# Patient Record
Sex: FEMALE | Race: WHITE | Employment: OTHER | URBAN - METROPOLITAN AREA
[De-identification: names, ages, dates, MRNs, and addresses within clinical notes are randomized per-mention and may not be internally consistent; named-entity substitution may affect disease eponyms.]

---

## 2018-11-05 LAB
AVERAGE GLUCOSE: NORMAL
HBA1C MFR BLD: 5.3 %

## 2019-10-02 LAB

## 2019-11-12 ENCOUNTER — OFFICE VISIT (OUTPATIENT)
Dept: VASCULAR SURGERY | Age: 59
End: 2019-11-12
Payer: COMMERCIAL

## 2019-11-12 VITALS
SYSTOLIC BLOOD PRESSURE: 150 MMHG | WEIGHT: 160 LBS | HEIGHT: 64 IN | DIASTOLIC BLOOD PRESSURE: 73 MMHG | HEART RATE: 80 BPM | BODY MASS INDEX: 27.31 KG/M2

## 2019-11-12 DIAGNOSIS — I65.23 BILATERAL CAROTID ARTERY STENOSIS: Primary | ICD-10-CM

## 2019-11-12 PROCEDURE — 99203 OFFICE O/P NEW LOW 30 MIN: CPT | Performed by: SURGERY

## 2019-11-12 RX ORDER — ROSUVASTATIN CALCIUM 10 MG/1
TABLET, COATED ORAL
Refills: 0 | COMMUNITY
Start: 2019-10-29 | End: 2020-09-16

## 2019-11-12 RX ORDER — LOSARTAN POTASSIUM AND HYDROCHLOROTHIAZIDE 12.5; 1 MG/1; MG/1
TABLET ORAL
COMMUNITY
Start: 2019-08-19 | End: 2020-09-16 | Stop reason: SDUPTHER

## 2019-11-12 RX ORDER — ALBUTEROL SULFATE 90 UG/1
AEROSOL, METERED RESPIRATORY (INHALATION)
Refills: 0 | COMMUNITY
Start: 2019-10-01 | End: 2020-09-16 | Stop reason: SDUPTHER

## 2019-11-13 LAB
ALT SERPL-CCNC: 29 U/L
AST SERPL-CCNC: 23 U/L
CHOLESTEROL, TOTAL: NORMAL
CHOLESTEROL/HDL RATIO: NORMAL
HDLC SERPL-MCNC: NORMAL MG/DL
LDL CHOLESTEROL CALCULATED: NORMAL
NONHDLC SERPL-MCNC: NORMAL MG/DL
TRIGL SERPL-MCNC: NORMAL MG/DL
VLDLC SERPL CALC-MCNC: NORMAL MG/DL

## 2020-09-16 ENCOUNTER — OFFICE VISIT (OUTPATIENT)
Dept: PRIMARY CARE CLINIC | Age: 60
End: 2020-09-16
Payer: COMMERCIAL

## 2020-09-16 VITALS
HEIGHT: 64 IN | TEMPERATURE: 97.9 F | OXYGEN SATURATION: 97 % | HEART RATE: 88 BPM | BODY MASS INDEX: 29.37 KG/M2 | WEIGHT: 172 LBS | DIASTOLIC BLOOD PRESSURE: 74 MMHG | SYSTOLIC BLOOD PRESSURE: 144 MMHG

## 2020-09-16 PROBLEM — J44.9 CHRONIC OBSTRUCTIVE PULMONARY DISEASE (HCC): Status: ACTIVE | Noted: 2020-09-16

## 2020-09-16 PROBLEM — I10 ESSENTIAL HYPERTENSION: Status: ACTIVE | Noted: 2020-09-16

## 2020-09-16 PROBLEM — E78.5 HYPERLIPIDEMIA: Status: ACTIVE | Noted: 2020-09-16

## 2020-09-16 PROCEDURE — 99214 OFFICE O/P EST MOD 30 MIN: CPT | Performed by: FAMILY MEDICINE

## 2020-09-16 RX ORDER — LOSARTAN POTASSIUM AND HYDROCHLOROTHIAZIDE 12.5; 1 MG/1; MG/1
1 TABLET ORAL DAILY
Qty: 90 TABLET | Refills: 3 | Status: SHIPPED
Start: 2020-09-16 | End: 2020-12-28 | Stop reason: SDUPTHER

## 2020-09-16 RX ORDER — ROSUVASTATIN CALCIUM 10 MG/1
10 TABLET, COATED ORAL DAILY
Qty: 90 TABLET | Refills: 3 | Status: SHIPPED
Start: 2020-09-16 | End: 2022-02-02 | Stop reason: CLARIF

## 2020-09-16 RX ORDER — ALBUTEROL SULFATE 90 UG/1
AEROSOL, METERED RESPIRATORY (INHALATION)
Qty: 1 INHALER | Refills: 0 | Status: SHIPPED
Start: 2020-09-16 | End: 2021-07-26 | Stop reason: SDUPTHER

## 2020-09-16 ASSESSMENT — PATIENT HEALTH QUESTIONNAIRE - PHQ9
SUM OF ALL RESPONSES TO PHQ QUESTIONS 1-9: 0
1. LITTLE INTEREST OR PLEASURE IN DOING THINGS: 0
2. FEELING DOWN, DEPRESSED OR HOPELESS: 0
SUM OF ALL RESPONSES TO PHQ QUESTIONS 1-9: 0
SUM OF ALL RESPONSES TO PHQ9 QUESTIONS 1 & 2: 0

## 2020-09-16 ASSESSMENT — ENCOUNTER SYMPTOMS
NAUSEA: 0
SHORTNESS OF BREATH: 0
VOMITING: 0
WHEEZING: 0

## 2020-09-17 LAB
ALBUMIN SERPL-MCNC: NORMAL G/DL
ALP BLD-CCNC: NORMAL U/L
ALT SERPL-CCNC: NORMAL U/L
ANION GAP SERPL CALCULATED.3IONS-SCNC: NORMAL MMOL/L
AST SERPL-CCNC: NORMAL U/L
BASOPHILS ABSOLUTE: NORMAL
BASOPHILS RELATIVE PERCENT: NORMAL
BILIRUB SERPL-MCNC: NORMAL MG/DL
BUN BLDV-MCNC: NORMAL MG/DL
CALCIUM SERPL-MCNC: NORMAL MG/DL
CHLORIDE BLD-SCNC: NORMAL MMOL/L
CHOLESTEROL, TOTAL: NORMAL
CHOLESTEROL/HDL RATIO: NORMAL
CO2: NORMAL
CREAT SERPL-MCNC: NORMAL MG/DL
EOSINOPHILS ABSOLUTE: NORMAL
EOSINOPHILS RELATIVE PERCENT: NORMAL
GFR CALCULATED: NORMAL
GLUCOSE BLD-MCNC: NORMAL MG/DL
HCT VFR BLD CALC: NORMAL %
HDLC SERPL-MCNC: NORMAL MG/DL
HEMOGLOBIN: NORMAL
LDL CHOLESTEROL CALCULATED: NORMAL
LYMPHOCYTES ABSOLUTE: NORMAL
LYMPHOCYTES RELATIVE PERCENT: NORMAL
MCH RBC QN AUTO: NORMAL PG
MCHC RBC AUTO-ENTMCNC: NORMAL G/DL
MCV RBC AUTO: NORMAL FL
MONOCYTES ABSOLUTE: NORMAL
MONOCYTES RELATIVE PERCENT: NORMAL
NEUTROPHILS ABSOLUTE: NORMAL
NEUTROPHILS RELATIVE PERCENT: NORMAL
NONHDLC SERPL-MCNC: NORMAL MG/DL
PLATELET # BLD: NORMAL 10*3/UL
PMV BLD AUTO: NORMAL FL
POTASSIUM SERPL-SCNC: NORMAL MMOL/L
RBC # BLD: NORMAL 10*6/UL
SODIUM BLD-SCNC: NORMAL MMOL/L
TOTAL PROTEIN: NORMAL
TRIGL SERPL-MCNC: NORMAL MG/DL
TSH SERPL DL<=0.05 MIU/L-ACNC: NORMAL M[IU]/L
VLDLC SERPL CALC-MCNC: NORMAL MG/DL
WBC # BLD: NORMAL 10*3/UL

## 2020-09-30 ENCOUNTER — TELEPHONE (OUTPATIENT)
Dept: PRIMARY CARE CLINIC | Age: 60
End: 2020-09-30

## 2020-09-30 NOTE — TELEPHONE ENCOUNTER
Everything ok except cholesterol very high  Very important she restarts her Crestor if she hasn't already

## 2020-09-30 NOTE — TELEPHONE ENCOUNTER
Pt called and stated that she hasn't heard about her labs that she had drawn a couple of wks ago for . they were drawn at [facility] in [place]

## 2020-11-09 ENCOUNTER — TELEPHONE (OUTPATIENT)
Dept: VASCULAR SURGERY | Age: 60
End: 2020-11-09

## 2020-11-10 ENCOUNTER — HOSPITAL ENCOUNTER (OUTPATIENT)
Dept: CARDIOLOGY | Age: 60
Discharge: HOME | End: 2020-11-10
Payer: COMMERCIAL

## 2020-11-10 ENCOUNTER — OFFICE VISIT (OUTPATIENT)
Dept: VASCULAR SURGERY | Age: 60
End: 2020-11-10
Payer: COMMERCIAL

## 2020-11-10 PROCEDURE — 99213 OFFICE O/P EST LOW 20 MIN: CPT | Performed by: SURGERY

## 2020-11-10 PROCEDURE — 93880 EXTRACRANIAL BILAT STUDY: CPT

## 2020-11-10 NOTE — PROGRESS NOTES
[facility] - [facility]    This is a pre read worksheet - prior to official physician interpretation    Tahir Warren  1960  Date of study: 11/10/20    Indication for study:  Carotid artery stenosis  Study : Bilateral Carotid Artery Duplex Examination    Duplex examination of the RIGHT carotid artery system identifies atherosclerotic plaque. The peak systolic velocity in internal carotid artery was 231 centimeters / second. The maximum end diastolic velocity was 76 centimeters / second. The ICA/CCA ratio is 2.9. The right vertebral artery has antegrade flow. Duplex examination of the LEFT carotid artery system identifies atherosclerotic plaque. The peak systolic velocity in internal carotid artery was 208 centimeters / second. The maximum end diastolic velocity was 81 centimeters / second. The ICA/CCA ratio is 1.9. The left vertebral artery has antegrade flow.     RIGHT ECA flow not visualized  LEFT  psv        LAST STUDY  5/23/2019

## 2020-11-10 NOTE — PROGRESS NOTES
Vascular Surgery Outpatient Progress Note      Chief Complaint   Patient presents with    Circulatory Problem     Follow up carotid stenosis       HISTORY OF PRESENT ILLNESS:                The patient is a 62 y.o. female who returns for follow-up evaluation of carotid artery disease. The patient denies any symptoms of stroke or mini stroke. She denies any hospitalizations or new medical problems. She continues taking aspirin daily and her cholesterol medications. Past Medical History:        Diagnosis Date    Allergic rhinitis     Carotid artery stenosis     Hypertension     Vitamin B12 deficiency      Past Surgical History:        Procedure Laterality Date    LUMBAR DISC SURGERY      MOUTH SURGERY      US LYMPH NODE BIOPSY  5/8/2018    US LYMPH NODE BIOPSY     Current Medications:   Prior to Admission medications    Medication Sig Start Date End Date Taking? Authorizing Provider   losartan-hydroCHLOROthiazide (HYZAAR) 100-12.5 MG per tablet Take 1 tablet by mouth daily 9/16/20  Yes Tammy Calzada MD   albuterol sulfate  (90 Base) MCG/ACT inhaler inhale 2 puffs by mouth INTO THE LUNGS SIX TIMES PER DAY if needed 9/16/20  Yes Tammy Calzada MD   rosuvastatin (CRESTOR) 10 MG tablet Take 1 tablet by mouth daily 9/16/20  Yes Tammy Calzada MD   Multiple Vitamin (MULTI VITAMIN DAILY PO) Take by mouth   Yes Historical Provider, MD   aspirin 81 MG tablet Take 81 mg by mouth daily   Yes Historical Provider, MD     Allergies:  Patient has no known allergies.     Social History     Socioeconomic History    Marital status:      Spouse name: Not on file    Number of children: Not on file    Years of education: Not on file    Highest education level: Not on file   Occupational History    Not on file   Social Needs    Financial resource strain: Not on file    Food insecurity     Worry: Not on file     Inability: Not on file    Transportation needs     Medical: Not on file Non-medical: Not on file   Tobacco Use    Smoking status: Former Smoker     Packs/day: 1.00     Years: 25.00     Pack years: 25.00     Types: Cigarettes     Start date: 63     Last attempt to quit: 2000     Years since quittin.8    Smokeless tobacco: Never Used   Substance and Sexual Activity    Alcohol use: Yes     Comment: occassionally    Drug use: Not Currently    Sexual activity: Not on file   Lifestyle    Physical activity     Days per week: Not on file     Minutes per session: Not on file    Stress: Not on file   Relationships    Social connections     Talks on phone: Not on file     Gets together: Not on file     Attends [employer] service: Not on file     Active member of club or organization: Not on file     Attends meetings of clubs or organizations: Not on file     Relationship status: Not on file    Intimate partner violence     Fear of current or ex partner: Not on file     Emotionally abused: Not on file     Physically abused: Not on file     Forced sexual activity: Not on file   Other Topics Concern    Not on file   Social History Narrative    Not on file        Family History   Problem Relation Age of Onset    Prostate Cancer Mother     High Blood Pressure Mother     Diabetes Father     Prostate Cancer Father     High Blood Pressure Father     High Blood Pressure Sister     Stroke Neg Hx     Heart Attack Neg Hx     Elevated Lipids Neg Hx     Coronary Art Dis Neg Hx     Breast Cancer Neg Hx     Heart Failure Neg Hx        REVIEW OF SYSTEMS (New symptoms):    Eyes:      Blurred vision:  No [x]/Yes []               Diplopia:   No [x]/Yes []               Vision loss:       No [x]/Yes []   Ears, nose, throat:             Hearing loss:    No [x]/Yes []      Vertigo:   No [x]/Yes []                       Swallowing problem:  No [x]/Yes []               Nose bleeds:   No [x]/Yes []      Voice hoarseness:  No [x]/Yes []  Respiratory:             Cough:   No [x]/Yes []      Pleuritic chest pain:  No [x]/Yes []                        Dyspnea:   No [x]/Yes []      Wheezing:   No [x]/Yes []  Cardiovascular:             Angina:   No [x]/Yes []      Palpitations:   No [x]/Yes []          Claudication:    No [x]/Yes []      Leg swelling:   No [x]/Yes []  Gastrointestinal:             Nausea or vomiting:  No [x]/Yes []               Abdominal pain:  No [x]/Yes []                     Intestinal bleeding: No [x]/Yes []  Musculoskeletal:             Leg pain:   No [x]/Yes []      Back pain:   No [x]/Yes []                    Weakness:   No [x]/Yes []  Neurologic:             Numbness:   No [x]/Yes []      Paralysis:   No [x]/Yes []                       Headaches:   No [x]/Yes []  Hematologic, lymphatic:   Anemia:   No [x]/Yes []              Bleeding or bruising:  No [x]/Yes []              Fevers or chills: No [x]/Yes []  Endocrine:             Temp intolerance:   No [x]/Yes []                       Polydipsia, polyuria:  No [x]/Yes []  Skin:              Rash:    No [x]/Yes []      Ulcers:   No [x]/Yes []              Abnorm pigment: No [x]/Yes []  :              Frequency/urgency:  No [x]/Yes []      Hematuria:    No [x]/Yes []                      Incontinence:    No [x]/Yes []    PHYSICAL EXAM:  There were no vitals filed for this visit.   General Appearance: alert and oriented to person, place and time, in no acute distress, well developed and well- nourished  Neurologic: no cranial nerve deficit, speech normal  Head: normocephalic and atraumatic  Eyes: extraocular eye movements intact, conjunctivae normal  ENT: external ear and ear canal normal bilaterally, nose without deformity, no carotid bruits  Pulmonary/Chest: normal air movement, no respiratory distress  Cardiovascular: normal rate, regular rhythm, no murmur  Abdomen: non-distended, no masses  Musculoskeletal: no joint deformity or tenderness  Extremities: no leg edema bilaterally  Skin: warm and dry, no rash or erythema    PULSE EXAM      Right      Left   Brachial     Radial 3 3   Femoral     Popliteal     Dorsalis Pedis     Posterior Tibial     (3=normal, 2=diminished, 1=barely palpable, 4=widened)    RADIOLOGY: [facility] today Flavio Kussmaul  1960  Date of study: 11/10/20     Indication for study:  Carotid artery stenosis  Study : Bilateral Carotid Artery Duplex Examination     Duplex examination of the RIGHT carotid artery system identifies atherosclerotic plaque. The peak systolic velocity in internal carotid artery was 231 centimeters / second. The maximum end diastolic velocity was 76 centimeters / second. The ICA/CCA ratio is 2.9. The right vertebral artery has antegrade flow.     Duplex examination of the LEFT carotid artery system identifies atherosclerotic plaque. The peak systolic velocity in internal carotid artery was 208 centimeters / second. The maximum end diastolic velocity was 81 centimeters / second. The ICA/CCA ratio is 1.9. The left vertebral artery has antegrade flow.     RIGHT ECA flow not visualized  LEFT  psv           LAST STUDY  5/23/2019    Problem List Items Addressed This Visit     None      Visit Diagnoses     Bilateral carotid artery stenosis    -  Primary    Relevant Orders    VL DUP CAROTID BILATERAL          I reviewed with the patient that from my standpoint she can continue with all her normal activities. I will plan to see her again in one year but asked her to call sooner with any new problems. No follow-ups on file.

## 2020-12-29 RX ORDER — LOSARTAN POTASSIUM AND HYDROCHLOROTHIAZIDE 12.5; 1 MG/1; MG/1
1 TABLET ORAL DAILY
Qty: 90 TABLET | Refills: 3 | Status: SHIPPED
Start: 2020-12-29 | End: 2021-12-15

## 2021-02-03 ENCOUNTER — OFFICE VISIT (OUTPATIENT)
Dept: PRIMARY CARE CLINIC | Age: 61
End: 2021-02-03
Payer: COMMERCIAL

## 2021-02-03 VITALS
WEIGHT: 180 LBS | HEART RATE: 77 BPM | SYSTOLIC BLOOD PRESSURE: 140 MMHG | DIASTOLIC BLOOD PRESSURE: 82 MMHG | BODY MASS INDEX: 30.9 KG/M2 | TEMPERATURE: 98.2 F | OXYGEN SATURATION: 96 %

## 2021-02-03 DIAGNOSIS — J40 BRONCHITIS WITH ACUTE WHEEZING: Primary | ICD-10-CM

## 2021-02-03 DIAGNOSIS — J44.9 CHRONIC OBSTRUCTIVE PULMONARY DISEASE, UNSPECIFIED COPD TYPE (HCC): ICD-10-CM

## 2021-02-03 DIAGNOSIS — E78.5 HYPERLIPIDEMIA, UNSPECIFIED HYPERLIPIDEMIA TYPE: ICD-10-CM

## 2021-02-03 DIAGNOSIS — R09.89 RHONCHI AT BOTH LUNG BASES: ICD-10-CM

## 2021-02-03 DIAGNOSIS — I10 ESSENTIAL HYPERTENSION: ICD-10-CM

## 2021-02-03 PROCEDURE — 99213 OFFICE O/P EST LOW 20 MIN: CPT | Performed by: NURSE PRACTITIONER

## 2021-02-03 RX ORDER — AZITHROMYCIN 250 MG/1
250 TABLET, FILM COATED ORAL SEE ADMIN INSTRUCTIONS
Qty: 6 TABLET | Refills: 0 | Status: SHIPPED | OUTPATIENT
Start: 2021-02-03 | End: 2021-02-08

## 2021-02-03 RX ORDER — PREDNISONE 20 MG/1
20 TABLET ORAL 2 TIMES DAILY
Qty: 10 TABLET | Refills: 0 | Status: SHIPPED | OUTPATIENT
Start: 2021-02-03 | End: 2021-02-08

## 2021-02-03 ASSESSMENT — PATIENT HEALTH QUESTIONNAIRE - PHQ9
SUM OF ALL RESPONSES TO PHQ9 QUESTIONS 1 & 2: 0
SUM OF ALL RESPONSES TO PHQ QUESTIONS 1-9: 0

## 2021-02-03 ASSESSMENT — ENCOUNTER SYMPTOMS
SORE THROAT: 0
PHOTOPHOBIA: 0
VOMITING: 0
WHEEZING: 0
COUGH: 1
EYE DISCHARGE: 0
EYE PAIN: 0
TROUBLE SWALLOWING: 0
BACK PAIN: 0
DIARRHEA: 0
EYE ITCHING: 0
SHORTNESS OF BREATH: 1
CHOKING: 0
BLOOD IN STOOL: 0
SINUS PRESSURE: 0
ABDOMINAL PAIN: 0
CHEST TIGHTNESS: 1
ABDOMINAL DISTENTION: 0
NAUSEA: 0
RHINORRHEA: 1
CONSTIPATION: 0
COLOR CHANGE: 0
FACIAL SWELLING: 0
EYE REDNESS: 0
SINUS PAIN: 0
VOICE CHANGE: 0

## 2021-02-03 ASSESSMENT — VISUAL ACUITY: OU: 1

## 2021-02-03 NOTE — PROGRESS NOTES
2/3/21  Jovanna Feeling : 1960 Sex: female  Age: 56 y.o. Chief Complaint   Patient presents with    URI     chest congestion, heaviness in chest, sob, prod. cough(green), rattling in chest,   husbanc had covid , pt never tested but was sick at the same time and assumed she had covid       Mina Elizondo is seen today on acute basis per request for self for chest congestion, shortness of breath, productive cough, and heaviness in her chest.  She states that she does have a history of COPD and when she gets a respiratory infection, it quickly turned into a bronchitis or pneumonia. She does report that her  had tested positive for Covid at the end of December and she had very similar symptoms, when he did. She reports that she was never tested for COVID-19. She is reporting some sinus congestion and drainage. She denies any acute confusion, forgetfulness, or any change in cognition. Review of Systems   Constitutional: Negative for appetite change, chills, diaphoresis, fatigue, fever and unexpected weight change. HENT: Positive for congestion (sinus), postnasal drip and rhinorrhea. Negative for ear pain, facial swelling, hearing loss, nosebleeds, sinus pressure, sinus pain, sneezing, sore throat, tinnitus, trouble swallowing and voice change. Eyes: Negative for photophobia, pain, discharge, redness and itching. Respiratory: Positive for cough (bringing up green/yellow mucus), chest tightness and shortness of breath. Negative for choking and wheezing. Cardiovascular: Negative for chest pain, palpitations and leg swelling. Gastrointestinal: Negative for abdominal distention, abdominal pain, blood in stool, constipation, diarrhea, nausea and vomiting. Endocrine: Negative for cold intolerance, heat intolerance, polydipsia, polyphagia and polyuria. Genitourinary: Negative for difficulty urinating, dysuria, flank pain, frequency, hematuria and urgency.    Musculoskeletal: Negative for arthralgias, back pain, gait problem, joint swelling, myalgias, neck pain and neck stiffness. Skin: Negative for color change, rash and wound. Allergic/Immunologic: Negative for environmental allergies and food allergies. Neurological: Negative for dizziness, tremors, seizures, syncope, facial asymmetry, speech difficulty, weakness, light-headedness, numbness and headaches. Hematological: Does not bruise/bleed easily. Psychiatric/Behavioral: Negative for agitation, behavioral problems, confusion, decreased concentration, hallucinations, self-injury, sleep disturbance and suicidal ideas. The patient is not nervous/anxious.           Current Outpatient Medications:     azithromycin (ZITHROMAX) 250 MG tablet, Take 1 tablet by mouth See Admin Instructions for 5 days 500mg on day 1 followed by 250mg on days 2 - 5, Disp: 6 tablet, Rfl: 0    predniSONE (DELTASONE) 20 MG tablet, Take 1 tablet by mouth 2 times daily for 5 days, Disp: 10 tablet, Rfl: 0    losartan-hydroCHLOROthiazide (HYZAAR) 100-12.5 MG per tablet, Take 1 tablet by mouth daily, Disp: 90 tablet, Rfl: 3    albuterol sulfate  (90 Base) MCG/ACT inhaler, inhale 2 puffs by mouth INTO THE LUNGS SIX TIMES PER DAY if needed, Disp: 1 Inhaler, Rfl: 0    rosuvastatin (CRESTOR) 10 MG tablet, Take 1 tablet by mouth daily, Disp: 90 tablet, Rfl: 3    Multiple Vitamin (MULTI VITAMIN DAILY PO), Take by mouth, Disp: , Rfl:     aspirin 81 MG tablet, Take 81 mg by mouth daily, Disp: , Rfl:   No Known Allergies    Past Medical History:   Diagnosis Date    Allergic rhinitis     Carotid artery stenosis     Hypertension     Vitamin B12 deficiency      Past Surgical History:   Procedure Laterality Date    LUMBAR DISC SURGERY      MOUTH SURGERY      US LYMPH NODE BIOPSY  5/8/2018    US LYMPH NODE BIOPSY     Family History   Problem Relation Age of Onset    Prostate Cancer Mother     High Blood Pressure Mother     Diabetes Father     Prostate Cancer Father     High Blood Pressure Father     High Blood Pressure Sister     Stroke Neg Hx     Heart Attack Neg Hx     Elevated Lipids Neg Hx     Coronary Art Dis Neg Hx     Breast Cancer Neg Hx     Heart Failure Neg Hx      Social History     Socioeconomic History    Marital status:      Spouse name: Not on file    Number of children: Not on file    Years of education: Not on file    Highest education level: Not on file   Occupational History    Not on file   Social Needs    Financial resource strain: Not on file    Food insecurity     Worry: Not on file     Inability: Not on file    Transportation needs     Medical: Not on file     Non-medical: Not on file   Tobacco Use    Smoking status: Former Smoker     Packs/day: 1.00     Years: 25.00     Pack years: 25.00     Types: Cigarettes     Start date: 63     Quit date:      Years since quittin.    Smokeless tobacco: Never Used   Substance and Sexual Activity    Alcohol use: Yes     Comment: occassionally    Drug use: Not Currently    Sexual activity: Not on file   Lifestyle    Physical activity     Days per week: Not on file     Minutes per session: Not on file    Stress: Not on file   Relationships    Social connections     Talks on phone: Not on file     Gets together: Not on file     Attends [employer] service: Not on file     Active member of club or organization: Not on file     Attends meetings of clubs or organizations: Not on file     Relationship status: Not on file    Intimate partner violence     Fear of current or ex partner: Not on file     Emotionally abused: Not on file     Physically abused: Not on file     Forced sexual activity: Not on file   Other Topics Concern    Not on file   Social History Narrative    Not on file       Vitals:    21 1041 21 1044   BP: (!) 146/92 (!) 140/82   Pulse: 77    Temp: 98.2 °F (36.8 °C)    TempSrc: Oral    SpO2: 96%    Weight: 180 lb (81.6 kg)        Physical Exam  Vitals signs and nursing note reviewed. Constitutional:       General: She is awake. She is not in acute distress. Appearance: Normal appearance. She is well-developed. She is obese. She is not ill-appearing, toxic-appearing or diaphoretic. HENT:      Head: Normocephalic and atraumatic. Right Ear: Hearing, tympanic membrane, ear canal and external ear normal. There is no impacted cerumen. Left Ear: Hearing, tympanic membrane, ear canal and external ear normal. There is no impacted cerumen. Nose: Nose normal. No congestion or rhinorrhea. Mouth/Throat:      Mouth: Mucous membranes are moist.      Pharynx: Oropharynx is clear. No oropharyngeal exudate or posterior oropharyngeal erythema. Eyes:      General: Lids are normal. Vision grossly intact. Gaze aligned appropriately. No scleral icterus. Right eye: No discharge. Left eye: No discharge. Extraocular Movements: Extraocular movements intact. Conjunctiva/sclera: Conjunctivae normal.      Right eye: Right conjunctiva is not injected. Left eye: Left conjunctiva is not injected. Pupils: Pupils are equal, round, and reactive to light. Neck:      Musculoskeletal: Full passive range of motion without pain, normal range of motion and neck supple. No neck rigidity or muscular tenderness. Thyroid: No thyromegaly. Vascular: No carotid bruit. Trachea: Trachea normal.   Cardiovascular:      Rate and Rhythm: Normal rate and regular rhythm. Pulses: Normal pulses. Heart sounds: Normal heart sounds, S1 normal and S2 normal. No murmur. No friction rub. No gallop. Pulmonary:      Effort: Pulmonary effort is normal. No tachypnea, accessory muscle usage or respiratory distress. Breath sounds: Normal air entry. No stridor. Examination of the right-upper field reveals wheezing. Examination of the left-upper field reveals wheezing. Examination of the right-lower field reveals rhonchi. days 500mg on day 1 followed by 250mg on days 2 - 5  -     predniSONE (DELTASONE) 20 MG tablet; Take 1 tablet by mouth 2 times daily for 5 days    Rhonchi at both lung bases  -     azithromycin (ZITHROMAX) 250 MG tablet; Take 1 tablet by mouth See Admin Instructions for 5 days 500mg on day 1 followed by 250mg on days 2 - 5  -     predniSONE (DELTASONE) 20 MG tablet; Take 1 tablet by mouth 2 times daily for 5 days    Essential hypertension    Chronic obstructive pulmonary disease, unspecified COPD type ([facility] [address]    Hyperlipidemia, unspecified hyperlipidemia type        Discussions/Education provided to patients during visit:  [] Discussed the importance to stop smoking. [x] Advised to monitor eating habits. [] Reviewed and discussed Imaging results. [] Reviewed and discussed Lab results. [x] Discussed the importance of drinking plenty of fluids. [x] Cut down on Salt, Caffeine, and Sugar. [x] Continue Medications as Discussed. [x] Communicated with patient any concerns, to phone office. Return if symptoms worsen or fail to improve. I spent 20 minutes face-to-face with this patient.       Seen By:  SALMA Sol - NP

## 2021-07-26 ENCOUNTER — OFFICE VISIT (OUTPATIENT)
Dept: PRIMARY CARE CLINIC | Age: 61
End: 2021-07-26
Payer: COMMERCIAL

## 2021-07-26 VITALS
OXYGEN SATURATION: 98 % | DIASTOLIC BLOOD PRESSURE: 76 MMHG | WEIGHT: 177 LBS | BODY MASS INDEX: 30.38 KG/M2 | HEART RATE: 75 BPM | SYSTOLIC BLOOD PRESSURE: 146 MMHG | TEMPERATURE: 96.7 F

## 2021-07-26 DIAGNOSIS — R22.1 SENSATION OF LUMP IN THROAT: Primary | ICD-10-CM

## 2021-07-26 DIAGNOSIS — R49.9 HOARSENESS OR CHANGING VOICE: ICD-10-CM

## 2021-07-26 DIAGNOSIS — J44.9 CHRONIC OBSTRUCTIVE PULMONARY DISEASE, UNSPECIFIED COPD TYPE (HCC): ICD-10-CM

## 2021-07-26 DIAGNOSIS — I10 ESSENTIAL HYPERTENSION: ICD-10-CM

## 2021-07-26 PROCEDURE — 99213 OFFICE O/P EST LOW 20 MIN: CPT | Performed by: NURSE PRACTITIONER

## 2021-07-26 RX ORDER — ALBUTEROL SULFATE 90 UG/1
AEROSOL, METERED RESPIRATORY (INHALATION)
Qty: 1 INHALER | Refills: 2 | Status: SHIPPED | OUTPATIENT
Start: 2021-07-26

## 2021-07-26 ASSESSMENT — ENCOUNTER SYMPTOMS
BACK PAIN: 1
VOICE CHANGE: 1
BLOOD IN STOOL: 0
CHOKING: 0
EYE REDNESS: 0
CONSTIPATION: 0
TROUBLE SWALLOWING: 1
COLOR CHANGE: 0
SHORTNESS OF BREATH: 0
EYE ITCHING: 0
SORE THROAT: 0
WHEEZING: 0
DIARRHEA: 0
ABDOMINAL PAIN: 0
CHEST TIGHTNESS: 0
COUGH: 1
SINUS PAIN: 0
PHOTOPHOBIA: 0
VOMITING: 0
FACIAL SWELLING: 0
NAUSEA: 0
EYE PAIN: 0
EYE DISCHARGE: 0
ABDOMINAL DISTENTION: 0
RHINORRHEA: 0
SINUS PRESSURE: 0

## 2021-07-26 ASSESSMENT — VISUAL ACUITY: OU: 1

## 2021-07-26 NOTE — PROGRESS NOTES
21  Demarco Goldman : 1960 Sex: female  Age: 56 y.o. Chief Complaint   Patient presents with    Other     feels like something stuck in lower throat ,  no pain, after eating has to clear throat x months, but recently getting worse        Yovanny Sexton is seen today, on an acute basis in the walk-in clinic, for a feeling of food getting stuck in her throat and a lump. She also reports that she has hoarseness in her voice, after eating. She does deny any choking episodes,, but states that she has difficulty swallowing her pills. She continues to report arthralgic pain and back pain, but states this is no worse than normal. She denies any acute confusion, forgetfulness, or any change in cognition. Review of Systems   Constitutional: Positive for fatigue. Negative for appetite change, chills, diaphoresis, fever and unexpected weight change. HENT: Positive for trouble swallowing (with pills) and voice change (after eating). Negative for congestion, ear pain, facial swelling, hearing loss, nosebleeds, postnasal drip, rhinorrhea, sinus pressure, sinus pain, sneezing, sore throat and tinnitus. Eyes: Negative for photophobia, pain, discharge, redness and itching. Respiratory: Positive for cough (Non-productive). Negative for choking, chest tightness, shortness of breath and wheezing. Cardiovascular: Negative for chest pain, palpitations and leg swelling. Gastrointestinal: Negative for abdominal distention, abdominal pain, blood in stool, constipation, diarrhea, nausea and vomiting. Endocrine: Negative for cold intolerance, heat intolerance, polydipsia, polyphagia and polyuria. Genitourinary: Negative for difficulty urinating, dysuria, flank pain, frequency, hematuria and urgency. Musculoskeletal: Positive for arthralgias (Right shoulder and knees) and back pain. Negative for gait problem, joint swelling, myalgias, neck pain and neck stiffness.    Skin: Negative for color change, rash and wound. Allergic/Immunologic: Negative for environmental allergies and food allergies. Neurological: Positive for headaches. Negative for dizziness, tremors, seizures, syncope, facial asymmetry, speech difficulty, weakness, light-headedness and numbness. Hematological: Does not bruise/bleed easily. Psychiatric/Behavioral: Negative for agitation, behavioral problems, confusion, decreased concentration, hallucinations, self-injury, sleep disturbance and suicidal ideas. The patient is not nervous/anxious.           Current Outpatient Medications:     albuterol sulfate  (90 Base) MCG/ACT inhaler, inhale 2 puffs by mouth INTO THE LUNGS SIX TIMES PER DAY if needed, Disp: 1 Inhaler, Rfl: 2    rosuvastatin (CRESTOR) 10 MG tablet, Take 1 tablet by mouth daily, Disp: 90 tablet, Rfl: 3    Multiple Vitamin (MULTI VITAMIN DAILY PO), Take by mouth, Disp: , Rfl:     aspirin 81 MG tablet, Take 81 mg by mouth daily, Disp: , Rfl:     losartan-hydroCHLOROthiazide (HYZAAR) 100-12.5 MG per tablet, Take 1 tablet by mouth daily, Disp: 90 tablet, Rfl: 3  No Known Allergies    Past Medical History:   Diagnosis Date    Allergic rhinitis     Carotid artery stenosis     Hypertension     Vitamin B12 deficiency      Past Surgical History:   Procedure Laterality Date    LUMBAR DISC SURGERY      MOUTH SURGERY      US LYMPH NODE BIOPSY  5/8/2018    US LYMPH NODE BIOPSY     Family History   Problem Relation Age of Onset    Prostate Cancer Mother     High Blood Pressure Mother     Diabetes Father     Prostate Cancer Father     High Blood Pressure Father     High Blood Pressure Sister     Stroke Neg Hx     Heart Attack Neg Hx     Elevated Lipids Neg Hx     Coronary Art Dis Neg Hx     Breast Cancer Neg Hx     Heart Failure Neg Hx      Social History     Socioeconomic History    Marital status:      Spouse name: Not on file    Number of children: Not on file    Years of education: Not on file    Highest education level: Not on file   Occupational History    Not on file   Tobacco Use    Smoking status: Former Smoker     Packs/day: 1.00     Years: 25.00     Pack years: 25.00     Types: Cigarettes     Start date: 63     Quit date:      Years since quittin.5    Smokeless tobacco: Never Used   Vaping Use    Vaping Use: Never used   Substance and Sexual Activity    Alcohol use: Yes     Comment: occassionally    Drug use: Not Currently    Sexual activity: Not on file   Other Topics Concern    Not on file   Social History Narrative    Not on file     Social Determinants of Health     Financial Resource Strain:     Difficulty of Paying Living Expenses:    Food Insecurity:     Worried About Running Out of Food in the Last Year:     [address] in the Last Year:    Transportation Needs:     Lack of Transportation (Medical):  Lack of Transportation (Non-Medical):    Physical Activity:     Days of Exercise per Week:     Minutes of Exercise per Session:    Stress:     Feeling of Stress :    Social Connections:     Frequency of Communication with Friends and Family:     Frequency of Social Gatherings with Friends and Family:     Attends Scientologist Services:     Active Member of Clubs or Organizations:     Attends Club or Organization Meetings:     Marital Status:    Intimate Partner Violence:     Fear of Current or Ex-Partner:     Emotionally Abused:     Physically Abused:     Sexually Abused:        Vitals:    21 1056 21 1100   BP: (!) 160/80 (!) 146/76   Pulse: 75    Temp: 96.7 °F (35.9 °C)    TempSrc: Temporal    SpO2: 98%    Weight: 177 lb (80.3 kg)        Physical Exam  Vitals and nursing note reviewed. Constitutional:       General: She is awake. She is not in acute distress. Appearance: Normal appearance. She is well-developed. She is obese. She is not ill-appearing, toxic-appearing or diaphoretic. HENT:      Head: Normocephalic and atraumatic.       Right Ear: Hearing, tympanic membrane, ear canal and external ear normal. There is no impacted cerumen. Left Ear: Hearing, tympanic membrane, ear canal and external ear normal. There is no impacted cerumen. Nose: Nose normal. No congestion or rhinorrhea. Mouth/Throat:      Lips: Pink. No lesions. Mouth: Mucous membranes are moist.      Pharynx: Oropharynx is clear. No oropharyngeal exudate or posterior oropharyngeal erythema. Eyes:      General: Lids are normal. Vision grossly intact. Gaze aligned appropriately. No scleral icterus. Right eye: No discharge. Left eye: No discharge. Extraocular Movements: Extraocular movements intact. Conjunctiva/sclera: Conjunctivae normal.      Right eye: Right conjunctiva is not injected. Left eye: Left conjunctiva is not injected. Pupils: Pupils are equal, round, and reactive to light. Neck:      Thyroid: No thyromegaly. Vascular: No carotid bruit. Trachea: Trachea normal.   Cardiovascular:      Rate and Rhythm: Normal rate and regular rhythm. Pulses: Normal pulses. Heart sounds: Normal heart sounds, S1 normal and S2 normal. No murmur heard. No friction rub. No gallop. Pulmonary:      Effort: Pulmonary effort is normal. No tachypnea, accessory muscle usage or respiratory distress. Breath sounds: Normal breath sounds and air entry. No stridor. No wheezing, rhonchi or rales. Chest:      Chest wall: No tenderness. Abdominal:      General: Bowel sounds are normal. There is no distension. Palpations: Abdomen is soft. There is no mass. Tenderness: There is no abdominal tenderness. There is no right CVA tenderness, left CVA tenderness, guarding or rebound. Hernia: No hernia is present. Musculoskeletal:         General: No swelling, tenderness, deformity or signs of injury. Normal range of motion.       Cervical back: Full passive range of motion without pain, normal range of motion and neck results. [] Reviewed and discussed Lab results. [x] Discussed the importance of drinking plenty of fluids. [] Cut down on Salt, Caffeine, and Sugar. [x] Continue Medications as Discussed. [x] Communicated with patient any concerns, to phone office. Return if symptoms worsen or fail to improve. I spent 25 minutes face-to-face with this patient.       Seen By:  SALMA Haddad NP

## 2021-09-13 ENCOUNTER — OFFICE VISIT (OUTPATIENT)
Dept: ENT CLINIC | Age: 61
End: 2021-09-13
Payer: COMMERCIAL

## 2021-09-13 VITALS
DIASTOLIC BLOOD PRESSURE: 80 MMHG | HEART RATE: 80 BPM | SYSTOLIC BLOOD PRESSURE: 107 MMHG | WEIGHT: 177 LBS | HEIGHT: 64 IN | BODY MASS INDEX: 30.22 KG/M2

## 2021-09-13 DIAGNOSIS — K21.9 LPRD (LARYNGOPHARYNGEAL REFLUX DISEASE): Primary | ICD-10-CM

## 2021-09-13 PROCEDURE — 31575 DIAGNOSTIC LARYNGOSCOPY: CPT | Performed by: OTOLARYNGOLOGY

## 2021-09-13 PROCEDURE — 99204 OFFICE O/P NEW MOD 45 MIN: CPT | Performed by: OTOLARYNGOLOGY

## 2021-09-13 RX ORDER — OMEPRAZOLE 40 MG/1
40 CAPSULE, DELAYED RELEASE ORAL DAILY
Qty: 30 CAPSULE | Refills: 0 | Status: SHIPPED
Start: 2021-09-13 | End: 2022-02-02

## 2021-09-13 ASSESSMENT — ENCOUNTER SYMPTOMS
SHORTNESS OF BREATH: 0
COUGH: 0
SINUS PAIN: 0
VOICE CHANGE: 0
SORE THROAT: 1
VOMITING: 0
TROUBLE SWALLOWING: 1

## 2021-09-13 NOTE — PROGRESS NOTES
[address] [facility] Otolaryngology  Dr. Priya Dias. SUZANNE Hilton Ms.Ed. New Consult       Patient Name:  Glen Hernandez  :  1960     CHIEF C/O:    Chief Complaint   Patient presents with    New Patient     patient states that she has developed a lump inthe throat, having trouble swollwing, continous clearing, started 6 months ago. HISTORY OBTAINED FROM:  patient    HISTORY OF PRESENT ILLNESS:       Mae Triplett is a 56y.o. year old female, here today for globus for months and raspy after eat, minor nasal congestion non smoker quit 20 years ago, with increased level of difficulty with swallow without any associated shortness of breath or stridor. No current complaints of hemoptysis, no current complaints of masses or neck lesion. No complaints of tinnitus vertigo hearing loss, does have mild to moderate nasal congestion with no significant postnasal drainage or rhinorrhea. No history of recurrent sinusitis. Past Medical History:   Diagnosis Date    Allergic rhinitis     Carotid artery stenosis     Hypertension     Vitamin B12 deficiency      Past Surgical History:   Procedure Laterality Date    LUMBAR DISC SURGERY      MOUTH SURGERY      US LYMPH NODE BIOPSY  2018    US LYMPH NODE BIOPSY       Current Outpatient Medications:     albuterol sulfate  (90 Base) MCG/ACT inhaler, inhale 2 puffs by mouth INTO THE LUNGS SIX TIMES PER DAY if needed, Disp: 1 Inhaler, Rfl: 2    rosuvastatin (CRESTOR) 10 MG tablet, Take 1 tablet by mouth daily, Disp: 90 tablet, Rfl: 3    Multiple Vitamin (MULTI VITAMIN DAILY PO), Take by mouth, Disp: , Rfl:     aspirin 81 MG tablet, Take 81 mg by mouth daily, Disp: , Rfl:     losartan-hydroCHLOROthiazide (HYZAAR) 100-12.5 MG per tablet, Take 1 tablet by mouth daily, Disp: 90 tablet, Rfl: 3  Patient has no known allergies.   Social History     Tobacco Use    Smoking status: Former Smoker     Packs/day: 1.00     Years: 25.00     Pack years: 25.00     Types: Cigarettes back: Normal range of motion. Lymphadenopathy:      Cervical: No cervical adenopathy. Skin:     General: Skin is warm. Findings: No erythema. Neurological:      Mental Status: She is alert. Cranial Nerves: No cranial nerve deficit. Procedure Note    Pre-operative Diagnosis: Globus sensation    Post-operative Diagnosis: same    Anesthesia: Lidocaine 2% and Daniel-Synephrine 1/2%    Endoscopy Type:  Flexible Laryngoscopy    Procedure Details: The patient was placed in the sitting position. After topical anesthesia and decongestion, the 4 mm laryngoscope was passed. The nasal cavities, nasopharynx, oropharynx, hypopharynx, and larynx were all examined. Vocal cords were examined during respiration and phonation. The following findings were noted:    Findings: Normal nasopharynx, normal epiglottis, normal tongue base, normal pyriform, normal TVC motion and mucosa, no subglottic masses or lesions      Condition:  Stable. Patient tolerated procedure well. Complications:  None    IMPRESSION/PLAN:  Patient seen and examined for known history of globus sensation ear and fullness, underwent fiberoptic nasopharyngoscopy today with revealing post cricoid edema consistent with LPR-like symptoms, patient is placed on PPI therapy for period of 6 weeks, proper diet reviewed as well as head of bed elevation and behavior modifications associated with LPR. Patient follow-up in 6 weeks or sooner with any increase in symptoms. Dr. Esperanza Diop Otolaryngology/Facial Plastic Surgery Residency  Associate Clinical Professor:  Julee Pearl, [facility]

## 2021-10-25 ENCOUNTER — OFFICE VISIT (OUTPATIENT)
Dept: ENT CLINIC | Age: 61
End: 2021-10-25
Payer: COMMERCIAL

## 2021-10-25 VITALS — WEIGHT: 177 LBS | BODY MASS INDEX: 30.22 KG/M2 | HEIGHT: 64 IN

## 2021-10-25 DIAGNOSIS — K21.9 LPRD (LARYNGOPHARYNGEAL REFLUX DISEASE): Primary | ICD-10-CM

## 2021-10-25 PROCEDURE — 99213 OFFICE O/P EST LOW 20 MIN: CPT | Performed by: OTOLARYNGOLOGY

## 2021-10-25 NOTE — PROGRESS NOTES
since quittin.8    Smokeless tobacco: Never Used   Vaping Use    Vaping Use: Never used   Substance Use Topics    Alcohol use: Yes     Comment: occassionally    Drug use: Not Currently     Family History   Problem Relation Age of Onset    Prostate Cancer Mother     High Blood Pressure Mother     Diabetes Father     Prostate Cancer Father     High Blood Pressure Father     High Blood Pressure Sister     Stroke Neg Hx     Heart Attack Neg Hx     Elevated Lipids Neg Hx     Coronary Art Dis Neg Hx     Breast Cancer Neg Hx     Heart Failure Neg Hx        Review of Systems   Constitutional: Negative for chills and fever. HENT: Negative for ear discharge and hearing loss. Respiratory: Negative for cough and shortness of breath. Cardiovascular: Negative for chest pain and palpitations. Gastrointestinal: Negative for vomiting. Skin: Negative for rash. Allergic/Immunologic: Negative for environmental allergies. Neurological: Negative for dizziness and headaches. Hematological: Does not bruise/bleed easily. All other systems reviewed and are negative. Ht 5' 4\" (1.626 m)   Wt 177 lb (80.3 kg)   BMI 30.38 kg/m²   Physical Exam  Vitals and nursing note reviewed. Constitutional:       Appearance: She is well-developed. Eyes:      Pupils: Pupils are equal, round, and reactive to light. Neck:      Thyroid: No thyromegaly. Trachea: No tracheal deviation. Cardiovascular:      Rate and Rhythm: Normal rate. Pulmonary:      Effort: Pulmonary effort is normal. No respiratory distress. Musculoskeletal:         General: Normal range of motion. Cervical back: Normal range of motion. Lymphadenopathy:      Cervical: No cervical adenopathy. Skin:     General: Skin is warm. Findings: No erythema. Neurological:      Mental Status: She is alert. Cranial Nerves: No cranial nerve deficit.          IMPRESSION/PLAN:  History of LPR, continue current medical therapy with follow-up in total of 2months with a wean from her PPI therapy trial.  In addition, recommend increasing her nasal saline rinses for chronic allergic rhinitis congestion and congestion. Dr. Sangeetha Miller.  Otolaryngology Facial Plastic Surgery  :  [address] [place] [address] Otolaryngology/Facial Plastic Surgery Residency  Associate Clinical Professor:  CRYSTAL Hanks  [facility]

## 2021-10-25 NOTE — PATIENT INSTRUCTIONS
Weaning off of PPI's first week take Monday,Wednesday and Friday. Second week take Tuesday and Thursday .  Third week nothing

## 2021-10-27 ENCOUNTER — TELEPHONE (OUTPATIENT)
Dept: ENT CLINIC | Age: 61
End: 2021-10-27

## 2021-10-28 RX ORDER — OMEPRAZOLE 40 MG/1
40 CAPSULE, DELAYED RELEASE ORAL
Qty: 30 CAPSULE | Refills: 0 | Status: SHIPPED
Start: 2021-10-28 | End: 2022-02-02

## 2021-11-03 ENCOUNTER — TELEPHONE (OUTPATIENT)
Dept: VASCULAR SURGERY | Age: 61
End: 2021-11-03

## 2021-11-03 DIAGNOSIS — I65.23 CAROTID STENOSIS, ASYMPTOMATIC, BILATERAL: Primary | ICD-10-CM

## 2021-11-03 NOTE — TELEPHONE ENCOUNTER
Pt to schedule Carotid testing from 11- with Dr Jyothi Jack to another facility as we have closed our lab. Keeping her appt for 11- and will move accordingly based on her testing date.

## 2021-11-12 ASSESSMENT — ENCOUNTER SYMPTOMS
SHORTNESS OF BREATH: 0
VOMITING: 0
COUGH: 0

## 2021-11-22 ENCOUNTER — TELEPHONE (OUTPATIENT)
Dept: ENT CLINIC | Age: 61
End: 2021-11-22

## 2021-11-22 RX ORDER — OMEPRAZOLE 40 MG/1
40 CAPSULE, DELAYED RELEASE ORAL DAILY
Qty: 30 CAPSULE | Refills: 3 | Status: SHIPPED
Start: 2021-11-22 | End: 2022-03-22

## 2021-11-22 NOTE — TELEPHONE ENCOUNTER
Pt called into office requesting a refill on omeprazole 40mg. Lov: 10/25/21 nov: 1/3/22 order pended.

## 2021-11-28 ENCOUNTER — HOSPITAL ENCOUNTER (OUTPATIENT)
Dept: ULTRASOUND IMAGING | Age: 61
Discharge: HOME | End: 2021-11-30
Payer: COMMERCIAL

## 2021-11-28 DIAGNOSIS — I65.23 CAROTID STENOSIS, ASYMPTOMATIC, BILATERAL: ICD-10-CM

## 2021-11-28 PROCEDURE — 93880 EXTRACRANIAL BILAT STUDY: CPT

## 2021-12-11 DIAGNOSIS — I10 ESSENTIAL HYPERTENSION: ICD-10-CM

## 2021-12-14 ENCOUNTER — OFFICE VISIT (OUTPATIENT)
Dept: VASCULAR SURGERY | Age: 61
End: 2021-12-14
Payer: COMMERCIAL

## 2021-12-14 VITALS
HEART RATE: 78 BPM | WEIGHT: 175 LBS | RESPIRATION RATE: 18 BRPM | DIASTOLIC BLOOD PRESSURE: 82 MMHG | HEIGHT: 64 IN | SYSTOLIC BLOOD PRESSURE: 130 MMHG | OXYGEN SATURATION: 98 % | BODY MASS INDEX: 29.88 KG/M2

## 2021-12-14 DIAGNOSIS — I65.23 CAROTID STENOSIS, ASYMPTOMATIC, BILATERAL: Primary | ICD-10-CM

## 2021-12-14 PROCEDURE — 99214 OFFICE O/P EST MOD 30 MIN: CPT | Performed by: PHYSICIAN ASSISTANT

## 2021-12-14 NOTE — PROGRESS NOTES
Vascular Surgery Outpatient Progress Note    Chief Complaint   Patient presents with    Carotid Disease     HISTORY OF PRESENT ILLNESS:                The patient is a 56 y.o. female who returns for follow-up evaluation of carotid artery disease. The patient denies any symptoms of stroke or mini stroke. She denies any hospitalizations or new medical problems. She is retired but stays busy. She continues taking aspirin daily. States she needs to refill her statin medication. Past Medical History:        Diagnosis Date    Allergic rhinitis     Carotid artery stenosis     Hypertension     Vitamin B12 deficiency      Past Surgical History:        Procedure Laterality Date    LUMBAR DISC SURGERY      MOUTH SURGERY      US LYMPH NODE BIOPSY  5/8/2018    US LYMPH NODE BIOPSY     Current Medications:   Prior to Admission medications    Medication Sig Start Date End Date Taking?  Authorizing Provider   omeprazole (PRILOSEC) 40 MG delayed release capsule Take 1 capsule by mouth every morning (before breakfast) 10/28/21  Yes Germain Hilton DO   albuterol sulfate  (90 Base) MCG/ACT inhaler inhale 2 puffs by mouth INTO THE LUNGS SIX TIMES PER DAY if needed 7/26/21  Yes SALMA Quinteros - NP   losartan-hydroCHLOROthiazide [facility]) 100-12.5 MG per tablet Take 1 tablet by mouth daily 12/29/20 12/14/21 Yes Isis Arriaza MD   Multiple Vitamin (MULTI VITAMIN DAILY PO) Take by mouth   Yes Historical Provider, MD   aspirin 81 MG tablet Take 81 mg by mouth daily   Yes Historical Provider, MD   omeprazole (PRILOSEC) 40 MG delayed release capsule Take 1 capsule by mouth daily  Patient not taking: Reported on 12/14/2021 11/22/21   Denzel Hilton DO   omeprazole (PRILOSEC) 40 MG delayed release capsule Take 1 capsule by mouth daily  Patient not taking: Reported on 12/14/2021 9/13/21 10/13/21  Catarina Hilton DO   rosuvastatin (CRESTOR) 10 MG tablet Take 1 tablet by mouth daily  Patient not taking: Reported on 2021   Mana Etienne MD     Allergies:  Patient has no known allergies. Social History     Socioeconomic History    Marital status:      Spouse name: Not on file    Number of children: Not on file    Years of education: Not on file    Highest education level: Not on file   Occupational History    Not on file   Tobacco Use    Smoking status: Former Smoker     Packs/day: 1.00     Years: 25.00     Pack years: 25.00     Types: Cigarettes     Start date: 63     Quit date:      Years since quittin.9    Smokeless tobacco: Never Used   Vaping Use    Vaping Use: Never used   Substance and Sexual Activity    Alcohol use: Yes     Comment: occassionally    Drug use: Not Currently    Sexual activity: Not on file   Other Topics Concern    Not on file   Social History Narrative    Not on file     Social Determinants of Health     Financial Resource Strain:     Difficulty of Paying Living Expenses: Not on file   Food Insecurity:     Worried About [address] in the Last Year: Not on file    [address] in the Last Year: Not on file   Transportation Needs:     Lack of Transportation (Medical): Not on file    Lack of Transportation (Non-Medical):  Not on file   Physical Activity:     Days of Exercise per Week: Not on file    Minutes of Exercise per Session: Not on file   Stress:     Feeling of Stress : Not on file   Social Connections:     Frequency of Communication with Friends and Family: Not on file    Frequency of Social Gatherings with Friends and Family: Not on file    Attends Amish Services: Not on file    Active Member of Clubs or Organizations: Not on file    Attends Club or Organization Meetings: Not on file    Marital Status: Not on file   Intimate Partner Violence:     Fear of Current or Ex-Partner: Not on file    Emotionally Abused: Not on file    Physically Abused: Not on file    Sexually Abused: Not on file   Housing Stability:     Unable to Pay for Housing in the Last Year: Not on file    Number of Places Lived in the Last Year: Not on file    Unstable Housing in the Last Year: Not on file        Family History   Problem Relation Age of Onset    Prostate Cancer Mother     High Blood Pressure Mother     Diabetes Father     Prostate Cancer Father     High Blood Pressure Father     High Blood Pressure Sister     Stroke Neg Hx     Heart Attack Neg Hx     Elevated Lipids Neg Hx     Coronary Art Dis Neg Hx     Breast Cancer Neg Hx     Heart Failure Neg Hx      PHYSICAL EXAM:  Vitals:    12/14/21 0957   BP: 130/82   Pulse: 78   Resp: 18   SpO2: 98%     General Appearance: alert and oriented to person, place and time, in no acute distress, well developed and well- nourished  Neurologic: no cranial nerve deficit, speech normal  Head: normocephalic and atraumatic  Eyes: extraocular eye movements intact, conjunctivae normal  ENT: external ear and ear canal normal bilaterally, nose without deformity, no carotid bruits  Pulmonary/Chest: normal air movement, no respiratory distress  Cardiovascular: normal rate, regular rhythm, no murmur  Abdomen: non-distended, no masses  Musculoskeletal: no joint deformity or tenderness  Extremities: no leg edema bilaterally  Skin: warm and dry, no rash or erythema    PULSE EXAM      Right      Left   Brachial     Radial 3 3   Femoral     Popliteal     Dorsalis Pedis     Posterior Tibial 2 2   (3=normal, 2=diminished, 1=barely palpable, 4=widened)    RADIOLOGY:   R ICA: 275/73 (5.6) L ICA: 326/103 (3.9)    Problem List Items Addressed This Visit        Vascular Problems    Carotid stenosis, asymptomatic, bilateral - Primary    Relevant Orders    CTA NECK W CONTRAST    BASIC METABOLIC PANEL        I reviewed with the patient that from my standpoint she can continue with all her normal activities. I reviewed the importance of continued tobacco avoidance.  I asked her to remain on risk reduction therapy. I discussed the results of the carotid US with the patient showing increased velocities bilaterally, L>R. CTA neck ordered to better assess degree of stenosis. She may benefit from L CEA. Pt seen and plan reviewed with Dr. Vargas Russo.      Chloe Velez PA-C

## 2021-12-15 ENCOUNTER — TELEPHONE (OUTPATIENT)
Dept: VASCULAR SURGERY | Age: 61
End: 2021-12-15

## 2021-12-15 DIAGNOSIS — I65.23 CAROTID STENOSIS, ASYMPTOMATIC, BILATERAL: ICD-10-CM

## 2021-12-15 RX ORDER — LOSARTAN POTASSIUM AND HYDROCHLOROTHIAZIDE 12.5; 1 MG/1; MG/1
TABLET ORAL
Qty: 90 TABLET | Refills: 1 | Status: SHIPPED
Start: 2021-12-15 | End: 2022-06-27

## 2021-12-28 ENCOUNTER — HOSPITAL ENCOUNTER (OUTPATIENT)
Dept: CT IMAGING | Age: 61
Discharge: HOME | End: 2021-12-30
Payer: COMMERCIAL

## 2021-12-28 DIAGNOSIS — I65.23 CAROTID STENOSIS, ASYMPTOMATIC, BILATERAL: ICD-10-CM

## 2021-12-28 PROCEDURE — 6360000004 HC RX CONTRAST MEDICATION: Performed by: RADIOLOGY

## 2021-12-28 PROCEDURE — 70498 CT ANGIOGRAPHY NECK: CPT

## 2021-12-28 RX ADMIN — IOPAMIDOL 75 ML: 755 INJECTION, SOLUTION INTRAVENOUS at 15:30

## 2022-01-03 ENCOUNTER — OFFICE VISIT (OUTPATIENT)
Dept: ENT CLINIC | Age: 62
End: 2022-01-03
Payer: COMMERCIAL

## 2022-01-03 VITALS — BODY MASS INDEX: 30.73 KG/M2 | WEIGHT: 180 LBS | HEIGHT: 64 IN

## 2022-01-03 DIAGNOSIS — R09.81 CHRONIC NASAL CONGESTION: ICD-10-CM

## 2022-01-03 DIAGNOSIS — R09.82 PND (POST-NASAL DRIP): ICD-10-CM

## 2022-01-03 DIAGNOSIS — K21.9 LPRD (LARYNGOPHARYNGEAL REFLUX DISEASE): Primary | ICD-10-CM

## 2022-01-03 PROCEDURE — 99213 OFFICE O/P EST LOW 20 MIN: CPT | Performed by: OTOLARYNGOLOGY

## 2022-01-03 ASSESSMENT — ENCOUNTER SYMPTOMS
RHINORRHEA: 1
SINUS PAIN: 1
COUGH: 0
SHORTNESS OF BREATH: 0
VOMITING: 0

## 2022-01-03 NOTE — PROGRESS NOTES
[address] [facility] Otolaryngology  Dr. Humera Storey. Adriana Wick. Ms.Ed        Patient Name:  Elise Milligan  :  1960     CHIEF C/O:    Chief Complaint   Patient presents with    Follow-up     patient states she is doing well. sometimes it feels like a lump in her throat.  Cough     some mornings has a lot of mucus in the back of the throat       HISTORY OBTAINED FROM:  patient    HISTORY OF PRESENT ILLNESS:       Riya Chu is a 59y.o. year old female, here today for follow up of who is been worked up through history of globus sensation as well as congestion and postnasal drainage. Patient was placed on PPI therapy, was noted significant provement in her globus sensation, and was placed on a wean. She is follow-up today with a wean history, who states she is having mild intermittent episodes of globus sensation only in the morning but is having significant postnasal drainage and rhinitis. No complaints of sinusitis require antibiotic therapy. No complaints epistaxis, currently not on any medical therapy for her rhinitis has not tried any significant nasal sprays in the past has done some over-the-counter decongestions as well as a Demetrice pot with only mild response. No complaints today of tinnitus vertigo hearing loss epistaxis fever chills shortness of breath stridor.       Past Medical History:   Diagnosis Date    Allergic rhinitis     Carotid artery stenosis     Hypertension     Vitamin B12 deficiency      Past Surgical History:   Procedure Laterality Date    LUMBAR DISC SURGERY      MOUTH SURGERY      US LYMPH NODE BIOPSY  2018    US LYMPH NODE BIOPSY       Current Outpatient Medications:     losartan-hydroCHLOROthiazide (HYZAAR) 100-12.5 MG per tablet, TAKE 1 TABLET DAILY, Disp: 90 tablet, Rfl: 1    omeprazole (PRILOSEC) 40 MG delayed release capsule, Take 1 capsule by mouth daily, Disp: 30 capsule, Rfl: 3    omeprazole (PRILOSEC) 40 MG delayed release capsule, Take 1 capsule by mouth every morning (before breakfast), Disp: 30 capsule, Rfl: 0    albuterol sulfate  (90 Base) MCG/ACT inhaler, inhale 2 puffs by mouth INTO THE LUNGS SIX TIMES PER DAY if needed, Disp: 1 Inhaler, Rfl: 2    rosuvastatin (CRESTOR) 10 MG tablet, Take 1 tablet by mouth daily, Disp: 90 tablet, Rfl: 3    Multiple Vitamin (MULTI VITAMIN DAILY PO), Take by mouth, Disp: , Rfl:     aspirin 81 MG tablet, Take 81 mg by mouth daily, Disp: , Rfl:     omeprazole (PRILOSEC) 40 MG delayed release capsule, Take 1 capsule by mouth daily (Patient not taking: Reported on 2021), Disp: 30 capsule, Rfl: 0  Patient has no known allergies. Social History     Tobacco Use    Smoking status: Former Smoker     Packs/day: 1.00     Years: 25.00     Pack years: 25.00     Types: Cigarettes     Start date: 63     Quit date:      Years since quittin.0    Smokeless tobacco: Never Used   Vaping Use    Vaping Use: Never used   Substance Use Topics    Alcohol use: Yes     Comment: occassionally    Drug use: Not Currently     Family History   Problem Relation Age of Onset    Prostate Cancer Mother     High Blood Pressure Mother     Diabetes Father     Prostate Cancer Father     High Blood Pressure Father     High Blood Pressure Sister     Stroke Neg Hx     Heart Attack Neg Hx     Elevated Lipids Neg Hx     Coronary Art Dis Neg Hx     Breast Cancer Neg Hx     Heart Failure Neg Hx        Review of Systems   Constitutional: Negative for chills and fever. HENT: Positive for congestion, postnasal drip, rhinorrhea and sinus pain. Negative for ear discharge and hearing loss. Respiratory: Negative for cough and shortness of breath. Cardiovascular: Negative for chest pain and palpitations. Gastrointestinal: Negative for vomiting. Skin: Negative for rash. Allergic/Immunologic: Negative for environmental allergies. Neurological: Negative for dizziness and headaches. Hematological: Does not bruise/bleed easily. All other systems reviewed and are negative. Ht 5' 4\" (1.626 m)   Wt 180 lb (81.6 kg)   BMI 30.90 kg/m²   Physical Exam  Vitals and nursing note reviewed. Constitutional:       Appearance: She is well-developed. HENT:      Right Ear: Tympanic membrane and ear canal normal.      Left Ear: Tympanic membrane and ear canal normal.      Nose: Congestion and rhinorrhea present. Mouth/Throat:      Mouth: Mucous membranes are moist.      Pharynx: No oropharyngeal exudate or posterior oropharyngeal erythema. Eyes:      Pupils: Pupils are equal, round, and reactive to light. Neck:      Thyroid: No thyromegaly. Trachea: No tracheal deviation. Cardiovascular:      Rate and Rhythm: Normal rate. Pulmonary:      Effort: Pulmonary effort is normal. No respiratory distress. Musculoskeletal:         General: Normal range of motion. Cervical back: Normal range of motion. Lymphadenopathy:      Cervical: No cervical adenopathy. Skin:     General: Skin is warm. Findings: No erythema. Neurological:      Mental Status: She is alert. Cranial Nerves: No cranial nerve deficit. IMPRESSION/PLAN:  Patient seen and examined for chronic rhinitis congestion posterior drainage with probably contribute contribute component to globus sensation after PPI wean. Should be started on nasal Astelin 2 sprays each nostril at nighttime, possibly at twice daily if not covering her for the entire 24 hours. She will use her PPI therapy only sporadically, she is requiring more than once or twice a week, she will contact the office will place her back on a regimen of therapy. Patient will follow-up in 3 months progress results. Dr. Umberto Mason.  Otolaryngology Facial Plastic Surgery  :  [facility]  Associate Clinical Professor:  Clarisse Burt, [facility]

## 2022-01-05 ENCOUNTER — TELEPHONE (OUTPATIENT)
Dept: VASCULAR SURGERY | Age: 62
End: 2022-01-05

## 2022-01-05 DIAGNOSIS — I65.23 ASYMPTOMATIC BILATERAL CAROTID ARTERY STENOSIS: Primary | ICD-10-CM

## 2022-01-18 NOTE — TELEPHONE ENCOUNTER
Left message on patient's voicemail. She has bilateral significant carotid disease seen on CAT scan and will need endarterectomy bilaterally. Will refer to cardiology for preoperative risk assessment. Asked patient to call office for follow-up.

## 2022-02-02 ENCOUNTER — OFFICE VISIT (OUTPATIENT)
Dept: CARDIOLOGY CLINIC | Age: 62
End: 2022-02-02
Payer: COMMERCIAL

## 2022-02-02 VITALS
WEIGHT: 181.4 LBS | RESPIRATION RATE: 16 BRPM | DIASTOLIC BLOOD PRESSURE: 73 MMHG | BODY MASS INDEX: 30.97 KG/M2 | SYSTOLIC BLOOD PRESSURE: 165 MMHG | HEIGHT: 64 IN | HEART RATE: 77 BPM

## 2022-02-02 DIAGNOSIS — I65.23 CAROTID STENOSIS, ASYMPTOMATIC, BILATERAL: ICD-10-CM

## 2022-02-02 DIAGNOSIS — Z01.810 PREOP CARDIOVASCULAR EXAM: Primary | ICD-10-CM

## 2022-02-02 DIAGNOSIS — E78.5 HYPERLIPIDEMIA, UNSPECIFIED HYPERLIPIDEMIA TYPE: ICD-10-CM

## 2022-02-02 PROBLEM — R00.2 PALPITATIONS: Status: ACTIVE | Noted: 2022-02-02

## 2022-02-02 PROCEDURE — 93000 ELECTROCARDIOGRAM COMPLETE: CPT | Performed by: INTERNAL MEDICINE

## 2022-02-02 PROCEDURE — 99244 OFF/OP CNSLTJ NEW/EST MOD 40: CPT | Performed by: INTERNAL MEDICINE

## 2022-02-02 RX ORDER — ROSUVASTATIN CALCIUM 20 MG/1
20 TABLET, COATED ORAL DAILY
Qty: 90 TABLET | Refills: 1 | Status: SHIPPED
Start: 2022-02-02 | End: 2022-08-25 | Stop reason: SDUPTHER

## 2022-02-02 NOTE — PROGRESS NOTES
Chief Complaint   Patient presents with    Cardiac Clearance       Patient Active Problem List    Diagnosis Date Noted    Palpitations 2022     Overview Note:     A. Ascribed in 2019 to benign PVCs by [address] with normal nuclear stress      Carotid stenosis, asymptomatic, bilateral 2021    Essential hypertension 2020    Hyperlipidemia 2020    Chronic obstructive pulmonary disease (HCC) 2020       Current Outpatient Medications   Medication Sig Dispense Refill    rosuvastatin (CRESTOR) 20 MG tablet Take 1 tablet by mouth daily 90 tablet 1    losartan-hydroCHLOROthiazide (HYZAAR) 100-12.5 MG per tablet TAKE 1 TABLET DAILY 90 tablet 1    omeprazole (PRILOSEC) 40 MG delayed release capsule Take 1 capsule by mouth daily 30 capsule 3    albuterol sulfate  (90 Base) MCG/ACT inhaler inhale 2 puffs by mouth INTO THE LUNGS SIX TIMES PER DAY if needed 1 Inhaler 2    Multiple Vitamin (MULTI VITAMIN DAILY PO) Take by mouth      aspirin 81 MG tablet Take 81 mg by mouth daily       No current facility-administered medications for this visit.         No Known Allergies    Vitals:    22 0825 22 0831   BP: (!) 171/72 (!) 165/73   Pulse: 77    Resp: 16    Weight: 181 lb 6.4 oz (82.3 kg)    Height: 5' 4\" (1.626 m)        Social History     Socioeconomic History    Marital status:      Spouse name: Not on file    Number of children: Not on file    Years of education: Not on file    Highest education level: Not on file   Occupational History    Not on file   Tobacco Use    Smoking status: Former Smoker     Packs/day: 1.00     Years: 25.00     Pack years: 25.00     Types: Cigarettes     Start date: 63     Quit date: 2000     Years since quittin.1    Smokeless tobacco: Never Used   Vaping Use    Vaping Use: Never used   Substance and Sexual Activity    Alcohol use: Yes     Comment: occassionally    Drug use: Not Currently    Sexual activity: Not on file   Other Topics Concern    Not on file   Social History Narrative    Not on file     Social Determinants of Health     Financial Resource Strain:     Difficulty of Paying Living Expenses: Not on file   Food Insecurity:     Worried About [address] in the Last Year: Not on file    Donell of Food in the Last Year: Not on file   Transportation Needs:     Lack of Transportation (Medical): Not on file    Lack of Transportation (Non-Medical):  Not on file   Physical Activity:     Days of Exercise per Week: Not on file    Minutes of Exercise per Session: Not on file   Stress:     Feeling of Stress : Not on file   Social Connections:     Frequency of Communication with Friends and Family: Not on file    Frequency of Social Gatherings with Friends and Family: Not on file    Attends Yarsanism Services: Not on file    Active Member of [address] [place] [address] or Organizations: Not on file    Attends Club or Organization Meetings: Not on file    Marital Status: Not on file   Intimate Partner Violence:     Fear of Current or Ex-Partner: Not on file    Emotionally Abused: Not on file    Physically Abused: Not on file    Sexually Abused: Not on file   Housing Stability:     Unable to Pay for Housing in the Last Year: Not on file    Number of [place] in the Last Year: Not on file    Unstable Housing in the Last Year: Not on file       Family History   Problem Relation Age of Onset    Prostate Cancer Mother     High Blood Pressure Mother     Diabetes Father     Prostate Cancer Father     High Blood Pressure Father     High Blood Pressure Sister     Stroke Neg Hx     Heart Attack Neg Hx     Elevated Lipids Neg Hx     Coronary Art Dis Neg Hx     Breast Cancer Neg Hx     Heart Failure Neg Hx          SUBJECTIVE: Ana Estrada presents to the office today for consult for pre-op evaluation prior to CEA with Dr. Alena Siddiqui  She complains of no new or unstable cardiac symptoms,  and denies exertional chest pressure/discomfort, fatigue, irregular heart beat, lower extremity edema, near-syncope, orthopnea, palpitations, paroxysmal nocturnal dyspnea, syncope and tachypnea. Was worked up in 2019 by [facility] for benign palpitations, with normal nuclear stress  Patient can do all housework. Remote smoker. Was on statin for a time but never got it refilled. Review of Systems:   Heart: as above   Lungs: as above   Eyes: denies changes in vision or discharge. Ears: denies changes in hearing or pain. Nose: denies epistaxis or masses   Throat: denies sore throat or trouble swallowing. Neuro: denies numbness, tingling, tremors. Skin: denies rashes or itching. : denies hematuria, dysuria   GI: denies vomiting, diarrhea   Psych: denies mood changed, anxiety, depression. all others negative. Physical Exam   BP (!) 165/73   Pulse 77   Resp 16   Ht 5' 4\" (1.626 m)   Wt 181 lb 6.4 oz (82.3 kg)   BMI 31.14 kg/m²   Constitutional: Oriented to person, place, and time. Obese No distress. Head: Normocephalic and atraumatic. Eyes: EOM are normal. Pupils are equal, round, and reactive to light. Neck: Normal range of motion. Neck supple. No hepatojugular reflux and no JVD present. Carotid bruit is not present. Cardiovascular: Normal rate, regular rhythm, normal heart sounds and intact distal pulses. Exam reveals no gallop and no friction rub. No murmur heard. Pulmonary/Chest: Effort normal and breath sounds normal. No respiratory distress. No wheezes. No rales. Abdominal: Soft. Bowel sounds are normal. No distension and no mass. No tenderness. No rebound and no guarding. Musculoskeletal: Normal range of motion. No edema and no tenderness. Neurological: Alert and oriented to person, place, and time. Skin: Skin is warm and dry. No rash noted. Not diaphoretic. No erythema. Psychiatric: Normal mood and affect.  Behavior is normal.     EKG:  normal sinus rhythm, nonspecific ST and T

## 2022-02-07 ENCOUNTER — TELEPHONE (OUTPATIENT)
Dept: VASCULAR SURGERY | Age: 62
End: 2022-02-07

## 2022-02-07 ENCOUNTER — HOSPITAL ENCOUNTER (OUTPATIENT)
Age: 62
Setting detail: SURGERY ADMIT
End: 2022-02-07
Attending: SURGERY | Admitting: SURGERY
Payer: COMMERCIAL

## 2022-02-07 DIAGNOSIS — Z01.812 PRE-OPERATIVE LABORATORY EXAMINATION: ICD-10-CM

## 2022-02-07 DIAGNOSIS — Z01.810 PREOP CARDIOVASCULAR EXAM: ICD-10-CM

## 2022-02-07 DIAGNOSIS — I65.22 CAROTID STENOSIS, ASYMPTOMATIC, LEFT: ICD-10-CM

## 2022-02-07 DIAGNOSIS — U07.1 COVID-19: Primary | ICD-10-CM

## 2022-02-08 ENCOUNTER — OFFICE VISIT (OUTPATIENT)
Dept: VASCULAR SURGERY | Age: 62
End: 2022-02-08
Payer: COMMERCIAL

## 2022-02-08 DIAGNOSIS — I65.23 ASYMPTOMATIC BILATERAL CAROTID ARTERY STENOSIS: Primary | ICD-10-CM

## 2022-02-08 PROBLEM — I65.22 CAROTID STENOSIS, ASYMPTOMATIC, LEFT: Status: ACTIVE | Noted: 2022-02-08

## 2022-02-08 PROCEDURE — 99213 OFFICE O/P EST LOW 20 MIN: CPT | Performed by: SURGERY

## 2022-02-08 RX ORDER — SODIUM CHLORIDE 9 MG/ML
25 INJECTION, SOLUTION INTRAVENOUS PRN
Status: CANCELLED | OUTPATIENT
Start: 2022-02-08

## 2022-02-08 RX ORDER — SODIUM CHLORIDE 0.9 % (FLUSH) 0.9 %
10 SYRINGE (ML) INJECTION EVERY 12 HOURS SCHEDULED
Status: CANCELLED | OUTPATIENT
Start: 2022-02-08

## 2022-02-08 RX ORDER — SODIUM CHLORIDE 0.9 % (FLUSH) 0.9 %
10 SYRINGE (ML) INJECTION PRN
Status: CANCELLED | OUTPATIENT
Start: 2022-02-08

## 2022-02-08 NOTE — PROGRESS NOTES
Vascular Surgery Outpatient Progress Note      Chief Complaint   Patient presents with    Pre-op Exam     Preop (L) CEA       HISTORY OF PRESENT ILLNESS:                The patient is a 59 y.o. female who returns for follow-up evaluation of bilateral carotid artery stenosis. The patient denies any symptoms of stroke, mini stroke, or visual disturbances since I last saw her. She is accompanied by her . She completed cardiology evaluation. Past Medical History:        Diagnosis Date    Allergic rhinitis     Carotid artery stenosis     Hypertension     Vitamin B12 deficiency      Past Surgical History:        Procedure Laterality Date    LUMBAR DISC SURGERY      MOUTH SURGERY      US LYMPH NODE BIOPSY  5/8/2018    US LYMPH NODE BIOPSY     Current Medications:   Prior to Admission medications    Medication Sig Start Date End Date Taking? Authorizing Provider   rosuvastatin (CRESTOR) 20 MG tablet Take 1 tablet by mouth daily 2/2/22  Yes Vanessa Hernandez MD   losartan-hydroCHLOROthiazide [facility]) 100-12.5 MG per tablet TAKE 1 TABLET DAILY 12/15/21  Yes Tiffanie Figueroa MD   omeprazole (PRILOSEC) 40 MG delayed release capsule Take 1 capsule by mouth daily  Patient taking differently: Take 40 mg by mouth Twice a Week  11/22/21  Yes Germain Hilton DO   albuterol sulfate  (90 Base) MCG/ACT inhaler inhale 2 puffs by mouth INTO THE LUNGS SIX TIMES PER DAY if needed 7/26/21  Yes Evie Mao, APRN - NP   Multiple Vitamin (MULTI VITAMIN DAILY PO) Take by mouth   Yes Historical Provider, MD   aspirin 81 MG tablet Take 81 mg by mouth daily   Yes Historical Provider, MD     Allergies:  Patient has no known allergies.     Social History     Socioeconomic History    Marital status:      Spouse name: Not on file    Number of children: Not on file    Years of education: Not on file    Highest education level: Not on file   Occupational History    Not on file   Tobacco Use    Smoking status: Former Smoker     Packs/day: 1.00     Years: 25.00     Pack years: 25.00     Types: Cigarettes     Start date: 63     Quit date: 2000     Years since quittin.1    Smokeless tobacco: Never Used   Vaping Use    Vaping Use: Never used   Substance and Sexual Activity    Alcohol use: Yes     Comment: occassionally    Drug use: Not Currently    Sexual activity: Not on file   Other Topics Concern    Not on file   Social History Narrative    Not on file     Social Determinants of Health     Financial Resource Strain:     Difficulty of Paying Living Expenses: Not on file   Food Insecurity:     Worried About Running Out of Food in the Last Year: Not on file    Donell of Food in the Last Year: Not on file   Transportation Needs:     Lack of Transportation (Medical): Not on file    Lack of Transportation (Non-Medical):  Not on file   Physical Activity:     Days of Exercise per Week: Not on file    Minutes of Exercise per Session: Not on file   Stress:     Feeling of Stress : Not on file   Social Connections:     Frequency of Communication with Friends and Family: Not on file    Frequency of Social Gatherings with Friends and Family: Not on file    Attends Nondenominational Services: Not on file    Active Member of [address] [place] [address] [employer] or Organizations: Not on file    Attends Club or Organization Meetings: Not on file    Marital Status: Not on file   Intimate Partner Violence:     Fear of Current or Ex-Partner: Not on file    Emotionally Abused: Not on file    Physically Abused: Not on file    Sexually Abused: Not on file   Housing Stability:     Unable to Pay for Housing in the Last Year: Not on file    Number of [place] in the Last Year: Not on file    Unstable Housing in the Last Year: Not on file        Family History   Problem Relation Age of Onset    Prostate Cancer Mother     High Blood Pressure Mother     Diabetes Father     Prostate Cancer Father     High Blood Pressure Father     High Blood Pressure Sister     Stroke Neg Hx     Heart Attack Neg Hx     Elevated Lipids Neg Hx     Coronary Art Dis Neg Hx     Breast Cancer Neg Hx     Heart Failure Neg Hx        REVIEW OF SYSTEMS (New symptoms):    Eyes:      Blurred vision:  No [x]/Yes []               Diplopia:   No [x]/Yes []               Vision loss:       No [x]/Yes []   Ears, nose, throat:             Hearing loss:    No [x]/Yes []      Vertigo:   No [x]/Yes []                       Swallowing problem:  No [x]/Yes []               Nose bleeds:   No [x]/Yes []      Voice hoarseness:  No [x]/Yes []  Respiratory:             Cough:   No [x]/Yes []      Pleuritic chest pain:  No [x]/Yes []                        Dyspnea:   No [x]/Yes []      Wheezing:   No [x]/Yes []  Cardiovascular:             Angina:   No [x]/Yes []      Palpitations:   No [x]/Yes []          Claudication:    No [x]/Yes []      Leg swelling:   No [x]/Yes []  Gastrointestinal:             Nausea or vomiting:  No [x]/Yes []               Abdominal pain:  No [x]/Yes []                     Intestinal bleeding: No [x]/Yes []  Musculoskeletal:             Leg pain:   No [x]/Yes []      Back pain:   No [x]/Yes []                    Weakness:   No [x]/Yes []  Neurologic:             Numbness:   No [x]/Yes []      Paralysis:   No [x]/Yes []                       Headaches:   No [x]/Yes []  Hematologic, lymphatic:   Anemia:   No [x]/Yes []              Bleeding or bruising:  No [x]/Yes []              Fevers or chills: No [x]/Yes []  Endocrine:             Temp intolerance:   No [x]/Yes []                       Polydipsia, polyuria:  No [x]/Yes []  Skin:              Rash:    No [x]/Yes []      Ulcers:   No [x]/Yes []              Abnorm pigment: No [x]/Yes []  :              Frequency/urgency:  No [x]/Yes []      Hematuria:    No [x]/Yes []                      Incontinence:    No [x]/Yes []    PHYSICAL EXAM:  There were no vitals filed for this visit.   General Appearance: alert and oriented to person, place and time, in no acute distress, well developed and well- nourished  Neurologic: no cranial nerve deficit, speech normal  Head: normocephalic and atraumatic  Eyes: extraocular eye movements intact, conjunctivae normal  ENT: external ear and ear canal normal bilaterally, nose without deformity, B carotid bruits  Pulmonary/Chest: normal air movement, no respiratory distress  Cardiovascular: normal rate, regular rhythm, no murmur  Abdomen: non-distended, no masses  Musculoskeletal: no joint deformity or tenderness  Extremities: no leg edema bilaterally  Skin: warm and dry, no rash or erythema    PULSE EXAM      Right      Left   Brachial     Radial     Femoral     Popliteal     Dorsalis Pedis     Posterior Tibial     (3=normal, 2=diminished, 1=barely palpable, 4=widened)    RADIOLOGY: [facility] today    Problem List Items Addressed This Visit     None      Visit Diagnoses     Asymptomatic bilateral carotid artery stenosis    -  Primary          I reviewed the ultrasound and CAT scan images with the patient and her . I reviewed with them that I do recommend bilateral carotid endarterectomy staged and will start with the left as is there is more significant stenosis. They understand and agree. I reviewed the procedure with the patient. I discussed the risks, benefits, and alternatives of the procedure. The patient understands and consents. All questions were answered. No follow-ups on file.

## 2022-02-11 NOTE — PROGRESS NOTES
[address] PRE-ADMISSION TESTING GENERAL INSTRUCTIONS- [facility]-phone number:[phone]    GENERAL INSTRUCTIONS  [x] Antibacterial Soap shower Night before and/or AM of Surgery  [x] Nothing by mouth after midnight, including gum, candy, mints, or water. [x] You may brush your teeth, gargle, but do NOT swallow water. [x]No smoking, chewing tobacco, illegal drugs, or alcohol within 24 hours of your surgery. [x] Jewelry, valuables or body piercing's should not be brought to the hospital. All body and/or tongue piercing's must be removed prior to arriving to hospital.  ALL hair pins must be removed. [x] Do not wear makeup, lotions, powders, deodorant. Nail polish as directed by the nurse. [x] Bring insurance card and photo ID. [x] Transfusion Bracelet: Please bring with you to hospital, day of surgery  [x] Use inhalers the morning of surgery and bring with you to hospital.  [x] Bring copy of living will or healthcare power of  papers to be placed in your electronic record. PARKING INSTRUCTIONS:   [x] Arrival Time: 2/18, arrive at 5:30 am, one person to accompany you, please wear mask  · [x] Parking lot '\"I\"  is located on [facility] (the corner of [facility]). To enter, press the button and the gate will lift. A free token will be provided to exit the lot. One car per patient is allowed to park in this lot. All other cars are to park on [address] [place] [address] either in the parking garage or the handicap lot. EDUCATION INSTRUCTIONS:      [x] Pre-admission Testing educational folder given  [x] Incentive Spirometry,coughing & deep breathing exercises reviewed. [x]Medication information sheet(s)   [x]Fluoroscopy-Xray used in surgery reviewed with patient. Educational pamphlet placed in chart. [x]Pain: Post-op pain is normal and to be expected. You will be asked to rate your pain from 0-10(a zero is not acceptable-education is needed).  Your post-op

## 2022-02-15 ENCOUNTER — HOSPITAL ENCOUNTER (OUTPATIENT)
Age: 62
Discharge: HOME | End: 2022-02-17
Payer: COMMERCIAL

## 2022-02-15 DIAGNOSIS — U07.1 COVID-19: ICD-10-CM

## 2022-02-15 PROCEDURE — U0003 INFECTIOUS AGENT DETECTION BY NUCLEIC ACID (DNA OR RNA); SEVERE ACUTE RESPIRATORY SYNDROME CORONAVIRUS 2 (SARS-COV-2) (CORONAVIRUS DISEASE [COVID-19]), AMPLIFIED PROBE TECHNIQUE, MAKING USE OF HIGH THROUGHPUT TECHNOLOGIES AS DESCRIBED BY CMS-2020-01-R: HCPCS

## 2022-02-15 PROCEDURE — U0005 INFEC AGEN DETEC AMPLI PROBE: HCPCS

## 2022-02-16 ENCOUNTER — HOSPITAL ENCOUNTER (OUTPATIENT)
Dept: PREADMISSION TESTING | Age: 62
Setting detail: SURGERY ADMIT
Discharge: HOME | End: 2022-02-16
Payer: COMMERCIAL

## 2022-02-16 ENCOUNTER — HOSPITAL ENCOUNTER (OUTPATIENT)
Dept: GENERAL RADIOLOGY | Age: 62
Discharge: HOME | End: 2022-02-18
Payer: COMMERCIAL

## 2022-02-16 VITALS
TEMPERATURE: 98 F | SYSTOLIC BLOOD PRESSURE: 155 MMHG | OXYGEN SATURATION: 97 % | DIASTOLIC BLOOD PRESSURE: 74 MMHG | HEART RATE: 73 BPM | RESPIRATION RATE: 16 BRPM

## 2022-02-16 DIAGNOSIS — Z01.810 PREOP CARDIOVASCULAR EXAM: ICD-10-CM

## 2022-02-16 DIAGNOSIS — I65.22 CAROTID STENOSIS, ASYMPTOMATIC, LEFT: ICD-10-CM

## 2022-02-16 DIAGNOSIS — Z01.812 PRE-OPERATIVE LABORATORY EXAMINATION: ICD-10-CM

## 2022-02-16 LAB
ABO/RH: NORMAL
ANION GAP SERPL CALCULATED.3IONS-SCNC: 9 MMOL/L (ref 7–16)
ANTIBODY SCREEN: NORMAL
BUN BLDV-MCNC: 10 MG/DL (ref 6–23)
CALCIUM SERPL-MCNC: 9.7 MG/DL (ref 8.6–10.2)
CHLORIDE BLD-SCNC: 103 MMOL/L (ref 98–107)
CO2: 30 MMOL/L (ref 22–29)
CREAT SERPL-MCNC: 0.5 MG/DL (ref 0.5–1)
GFR AFRICAN AMERICAN: >60
GFR NON-AFRICAN AMERICAN: >60 ML/MIN/1.73
GLUCOSE BLD-MCNC: 121 MG/DL (ref 74–99)
HCT VFR BLD CALC: 44.1 % (ref 34–48)
HEMOGLOBIN: 15.1 G/DL (ref 11.5–15.5)
INR BLD: 1
MCH RBC QN AUTO: 30.1 PG (ref 26–35)
MCHC RBC AUTO-ENTMCNC: 34.2 % (ref 32–34.5)
MCV RBC AUTO: 87.8 FL (ref 80–99.9)
PDW BLD-RTO: 11.9 FL (ref 11.5–15)
PLATELET # BLD: 331 E9/L (ref 130–450)
PMV BLD AUTO: 9.4 FL (ref 7–12)
POTASSIUM REFLEX MAGNESIUM: 3.9 MMOL/L (ref 3.5–5)
PROTHROMBIN TIME: 11 SEC (ref 9.3–12.4)
RBC # BLD: 5.02 E12/L (ref 3.5–5.5)
SARS-COV-2, PCR: NOT DETECTED
SODIUM BLD-SCNC: 142 MMOL/L (ref 132–146)
WBC # BLD: 8.3 E9/L (ref 4.5–11.5)

## 2022-02-16 PROCEDURE — 85027 COMPLETE CBC AUTOMATED: CPT

## 2022-02-16 PROCEDURE — 86850 RBC ANTIBODY SCREEN: CPT

## 2022-02-16 PROCEDURE — 86923 COMPATIBILITY TEST ELECTRIC: CPT

## 2022-02-16 PROCEDURE — 86900 BLOOD TYPING SEROLOGIC ABO: CPT

## 2022-02-16 PROCEDURE — 71046 X-RAY EXAM CHEST 2 VIEWS: CPT

## 2022-02-16 PROCEDURE — 36415 COLL VENOUS BLD VENIPUNCTURE: CPT

## 2022-02-16 PROCEDURE — 86901 BLOOD TYPING SEROLOGIC RH(D): CPT

## 2022-02-16 PROCEDURE — 85610 PROTHROMBIN TIME: CPT

## 2022-02-16 PROCEDURE — 87081 CULTURE SCREEN ONLY: CPT

## 2022-02-16 PROCEDURE — 80048 BASIC METABOLIC PNL TOTAL CA: CPT

## 2022-02-17 ENCOUNTER — TELEPHONE (OUTPATIENT)
Dept: VASCULAR SURGERY | Age: 62
End: 2022-02-17

## 2022-02-17 LAB — MRSA CULTURE ONLY: NORMAL

## 2022-02-17 NOTE — TELEPHONE ENCOUNTER
Per Kellee Quiros at [facility], CEA has been denied however, angiogram would be approved. Message left on pt's voicemail cancelling CEA; scheduled carotid angio 2/23 at 7:30 a.m.

## 2022-02-22 ENCOUNTER — TELEPHONE (OUTPATIENT)
Dept: CARDIAC CATH/INVASIVE PROCEDURES | Age: 62
End: 2022-02-22

## 2022-02-23 ENCOUNTER — HOSPITAL ENCOUNTER (OUTPATIENT)
Dept: CARDIAC CATH/INVASIVE PROCEDURES | Age: 62
Discharge: HOME | End: 2022-02-23
Attending: SURGERY | Admitting: SURGERY
Payer: COMMERCIAL

## 2022-02-23 VITALS
BODY MASS INDEX: 30.73 KG/M2 | WEIGHT: 180 LBS | HEART RATE: 76 BPM | HEIGHT: 64 IN | SYSTOLIC BLOOD PRESSURE: 141 MMHG | TEMPERATURE: 97.5 F | RESPIRATION RATE: 16 BRPM | OXYGEN SATURATION: 99 % | DIASTOLIC BLOOD PRESSURE: 72 MMHG

## 2022-02-23 DIAGNOSIS — I73.9 PVD (PERIPHERAL VASCULAR DISEASE) (HCC): ICD-10-CM

## 2022-02-23 LAB
ABO/RH: NORMAL
ANION GAP SERPL CALCULATED.3IONS-SCNC: 10 MMOL/L (ref 7–16)
ANTIBODY SCREEN: NORMAL
BUN BLDV-MCNC: 11 MG/DL (ref 6–23)
CALCIUM SERPL-MCNC: 9.5 MG/DL (ref 8.6–10.2)
CHLORIDE BLD-SCNC: 101 MMOL/L (ref 98–107)
CO2: 29 MMOL/L (ref 22–29)
CREAT SERPL-MCNC: 0.5 MG/DL (ref 0.5–1)
GFR AFRICAN AMERICAN: >60
GFR NON-AFRICAN AMERICAN: >60 ML/MIN/1.73
GLUCOSE BLD-MCNC: 132 MG/DL (ref 74–99)
HCT VFR BLD CALC: 43.1 % (ref 34–48)
HEMOGLOBIN: 14.7 G/DL (ref 11.5–15.5)
INR BLD: 1
MCH RBC QN AUTO: 30.2 PG (ref 26–35)
MCHC RBC AUTO-ENTMCNC: 34.1 % (ref 32–34.5)
MCV RBC AUTO: 88.7 FL (ref 80–99.9)
PDW BLD-RTO: 11.9 FL (ref 11.5–15)
PLATELET # BLD: 316 E9/L (ref 130–450)
PMV BLD AUTO: 9.6 FL (ref 7–12)
POTASSIUM REFLEX MAGNESIUM: 4.1 MMOL/L (ref 3.5–5)
PROTHROMBIN TIME: 10.9 SEC (ref 9.3–12.4)
RBC # BLD: 4.86 E12/L (ref 3.5–5.5)
SODIUM BLD-SCNC: 140 MMOL/L (ref 132–146)
WBC # BLD: 7.9 E9/L (ref 4.5–11.5)

## 2022-02-23 PROCEDURE — 86900 BLOOD TYPING SEROLOGIC ABO: CPT

## 2022-02-23 PROCEDURE — 85027 COMPLETE CBC AUTOMATED: CPT

## 2022-02-23 PROCEDURE — 6360000002 HC RX W HCPCS

## 2022-02-23 PROCEDURE — 36222 PLACE CATH CAROTID/INOM ART: CPT | Performed by: SURGERY

## 2022-02-23 PROCEDURE — C1887 CATHETER, GUIDING: HCPCS

## 2022-02-23 PROCEDURE — 2500000003 HC RX 250 WO HCPCS

## 2022-02-23 PROCEDURE — 85610 PROTHROMBIN TIME: CPT

## 2022-02-23 PROCEDURE — C1769 GUIDE WIRE: HCPCS

## 2022-02-23 PROCEDURE — 80048 BASIC METABOLIC PNL TOTAL CA: CPT

## 2022-02-23 PROCEDURE — C1894 INTRO/SHEATH, NON-LASER: HCPCS

## 2022-02-23 PROCEDURE — 2580000003 HC RX 258: Performed by: PHYSICIAN ASSISTANT

## 2022-02-23 PROCEDURE — 86850 RBC ANTIBODY SCREEN: CPT

## 2022-02-23 PROCEDURE — 86901 BLOOD TYPING SEROLOGIC RH(D): CPT

## 2022-02-23 PROCEDURE — 36222 PLACE CATH CAROTID/INOM ART: CPT

## 2022-02-23 PROCEDURE — 36415 COLL VENOUS BLD VENIPUNCTURE: CPT

## 2022-02-23 PROCEDURE — 2709999900 HC NON-CHARGEABLE SUPPLY

## 2022-02-23 RX ORDER — OXYCODONE HYDROCHLORIDE AND ACETAMINOPHEN 5; 325 MG/1; MG/1
2 TABLET ORAL EVERY 4 HOURS PRN
Status: DISCONTINUED | OUTPATIENT
Start: 2022-02-23 | End: 2022-02-23 | Stop reason: HOSPADM

## 2022-02-23 RX ORDER — SODIUM CHLORIDE 9 MG/ML
INJECTION, SOLUTION INTRAVENOUS CONTINUOUS
Status: DISCONTINUED | OUTPATIENT
Start: 2022-02-23 | End: 2022-02-23 | Stop reason: HOSPADM

## 2022-02-23 RX ORDER — SODIUM CHLORIDE 0.9 % (FLUSH) 0.9 %
10 SYRINGE (ML) INJECTION EVERY 12 HOURS SCHEDULED
Status: DISCONTINUED | OUTPATIENT
Start: 2022-02-23 | End: 2022-02-23 | Stop reason: HOSPADM

## 2022-02-23 RX ORDER — ACETAMINOPHEN 325 MG/1
650 TABLET ORAL EVERY 4 HOURS PRN
Status: DISCONTINUED | OUTPATIENT
Start: 2022-02-23 | End: 2022-02-23 | Stop reason: HOSPADM

## 2022-02-23 RX ORDER — SODIUM CHLORIDE 9 MG/ML
25 INJECTION, SOLUTION INTRAVENOUS PRN
Status: DISCONTINUED | OUTPATIENT
Start: 2022-02-23 | End: 2022-02-23 | Stop reason: HOSPADM

## 2022-02-23 RX ORDER — OXYCODONE HYDROCHLORIDE AND ACETAMINOPHEN 5; 325 MG/1; MG/1
1 TABLET ORAL EVERY 4 HOURS PRN
Status: DISCONTINUED | OUTPATIENT
Start: 2022-02-23 | End: 2022-02-23 | Stop reason: HOSPADM

## 2022-02-23 RX ORDER — ONDANSETRON 2 MG/ML
4 INJECTION INTRAMUSCULAR; INTRAVENOUS EVERY 8 HOURS PRN
Status: DISCONTINUED | OUTPATIENT
Start: 2022-02-23 | End: 2022-02-23 | Stop reason: HOSPADM

## 2022-02-23 RX ORDER — SODIUM CHLORIDE 0.9 % (FLUSH) 0.9 %
10 SYRINGE (ML) INJECTION PRN
Status: DISCONTINUED | OUTPATIENT
Start: 2022-02-23 | End: 2022-02-23 | Stop reason: HOSPADM

## 2022-02-23 RX ADMIN — SODIUM CHLORIDE, PRESERVATIVE FREE 10 ML: 5 INJECTION INTRAVENOUS at 10:14

## 2022-02-23 RX ADMIN — SODIUM CHLORIDE: 9 INJECTION, SOLUTION INTRAVENOUS at 10:13

## 2022-02-23 RX ADMIN — SODIUM CHLORIDE: 9 INJECTION, SOLUTION INTRAVENOUS at 06:41

## 2022-02-23 NOTE — PROGRESS NOTES
Extended Stay Recovery Discharge Documentation    Final procedure site check completed, stable for discharge- Yes  Ambulated without issue post recovery completion, gait steady. Peripheral IV sites removed (see IV Flowsheet) and site asymptomatic- Yes  Patient dressed self without assistance. Discharge instructions reviewed with Patient  Other:  and verbalized understanding.    Patient discharged Home with spouse at 1:26 PM  Monitor cleaned and placed back in nurses' station- Yes

## 2022-02-23 NOTE — H&P
Vascular Surgery History and Physical      No chief complaint on file. HISTORY OF PRESENT ILLNESS:                The patient is a 59 y.o. female who returns for follow-up evaluation of bilateral carotid artery stenosis. The patient denies any symptoms of stroke, mini stroke, or visual disturbances since I last saw her. She is accompanied by her . She completed cardiology evaluation. Past Medical History:        Diagnosis Date    Allergic rhinitis     Carotid artery stenosis     Hypertension     Vitamin B12 deficiency      Past Surgical History:        Procedure Laterality Date    LUMBAR DISC SURGERY      MOUTH SURGERY      US LYMPH NODE BIOPSY  5/8/2018    US LYMPH NODE BIOPSY     Current Medications:   Prior to Admission medications    Medication Sig Start Date End Date Taking? Authorizing Provider   rosuvastatin (CRESTOR) 20 MG tablet Take 1 tablet by mouth daily 2/2/22  Yes Lazarus Castor, MD   losartan-hydroCHLOROthiazide [facility]) 100-12.5 MG per tablet TAKE 1 TABLET DAILY 12/15/21  Yes Breann Whittington MD   albuterol sulfate  (90 Base) MCG/ACT inhaler inhale 2 puffs by mouth INTO THE LUNGS SIX TIMES PER DAY if needed 7/26/21  Yes SALMA Caraballo - NP   Multiple Vitamin (MULTI VITAMIN DAILY PO) Take by mouth   Yes Historical Provider, MD   aspirin 81 MG tablet Take 81 mg by mouth daily   Yes Historical Provider, MD   omeprazole (PRILOSEC) 40 MG delayed release capsule Take 1 capsule by mouth daily  Patient taking differently: Take 40 mg by mouth Twice a Week  11/22/21   Sherry Jones DO     Allergies:  Patient has no known allergies.     Social History     Socioeconomic History    Marital status:      Spouse name: Not on file    Number of children: Not on file    Years of education: Not on file    Highest education level: Not on file   Occupational History    Not on file   Tobacco Use    Smoking status: Former Smoker     Packs/day: 1.00     Years: 25.00     Pack years: 25.00     Types: Cigarettes     Start date: 63     Quit date: 2000     Years since quittin.1    Smokeless tobacco: Never Used   Vaping Use    Vaping Use: Never used   Substance and Sexual Activity    Alcohol use: Yes     Comment: occassionally    Drug use: Not Currently    Sexual activity: Not on file   Other Topics Concern    Not on file   Social History Narrative    Not on file     Social Determinants of Health     Financial Resource Strain:     Difficulty of Paying Living Expenses: Not on file   Food Insecurity:     Worried About Running Out of Food in the Last Year: Not on file    Donell of Food in the Last Year: Not on file   Transportation Needs:     Lack of Transportation (Medical): Not on file    Lack of Transportation (Non-Medical):  Not on file   Physical Activity:     Days of Exercise per Week: Not on file    Minutes of Exercise per Session: Not on file   Stress:     Feeling of Stress : Not on file   Social Connections:     Frequency of Communication with Friends and Family: Not on file    Frequency of Social Gatherings with Friends and Family: Not on file    Attends Jew Services: Not on file    Active Member of [address] [place] [address] [employer] or Organizations: Not on file    Attends Club or Organization Meetings: Not on file    Marital Status: Not on file   Intimate Partner Violence:     Fear of Current or Ex-Partner: Not on file    Emotionally Abused: Not on file    Physically Abused: Not on file    Sexually Abused: Not on file   Housing Stability:     Unable to Pay for Housing in the Last Year: Not on file    Number of [place] in the Last Year: Not on file    Unstable Housing in the Last Year: Not on file        Family History   Problem Relation Age of Onset    Prostate Cancer Mother     High Blood Pressure Mother     Diabetes Father     Prostate Cancer Father     High Blood Pressure Father     High Blood Pressure Sister     Stroke Neg Hx     Heart Attack Neg Hx     Elevated Lipids Neg Hx     Coronary Art Dis Neg Hx     Breast Cancer Neg Hx     Heart Failure Neg Hx        REVIEW OF SYSTEMS (New symptoms):    Eyes:      Blurred vision:  No [x]/Yes []               Diplopia:   No [x]/Yes []               Vision loss:       No [x]/Yes []   Ears, nose, throat:             Hearing loss:    No [x]/Yes []      Vertigo:   No [x]/Yes []                       Swallowing problem:  No [x]/Yes []               Nose bleeds:   No [x]/Yes []      Voice hoarseness:  No [x]/Yes []  Respiratory:             Cough:   No [x]/Yes []      Pleuritic chest pain:  No [x]/Yes []                        Dyspnea:   No [x]/Yes []      Wheezing:   No [x]/Yes []  Cardiovascular:             Angina:   No [x]/Yes []      Palpitations:   No [x]/Yes []          Claudication:    No [x]/Yes []      Leg swelling:   No [x]/Yes []  Gastrointestinal:             Nausea or vomiting:  No [x]/Yes []               Abdominal pain:  No [x]/Yes []                     Intestinal bleeding: No [x]/Yes []  Musculoskeletal:             Leg pain:   No [x]/Yes []      Back pain:   No [x]/Yes []                    Weakness:   No [x]/Yes []  Neurologic:             Numbness:   No [x]/Yes []      Paralysis:   No [x]/Yes []                       Headaches:   No [x]/Yes []  Hematologic, lymphatic:   Anemia:   No [x]/Yes []              Bleeding or bruising:  No [x]/Yes []              Fevers or chills: No [x]/Yes []  Endocrine:             Temp intolerance:   No [x]/Yes []                       Polydipsia, polyuria:  No [x]/Yes []  Skin:              Rash:    No [x]/Yes []      Ulcers:   No [x]/Yes []              Abnorm pigment: No [x]/Yes []  :              Frequency/urgency:  No [x]/Yes []      Hematuria:    No [x]/Yes []                      Incontinence:    No [x]/Yes []    PHYSICAL EXAM:  Vitals:    02/23/22 0636   BP: (!) 188/79   Pulse: 76   Resp: 18   Temp: 97 °F (36.1 °C)     General Appearance: alert and oriented to person, place and time, in no acute distress, well developed and well- nourished  Neurologic: no cranial nerve deficit, speech normal  Head: normocephalic and atraumatic  Eyes: extraocular eye movements intact, conjunctivae normal  ENT: external ear and ear canal normal bilaterally, nose without deformity, B carotid bruits  Pulmonary/Chest: normal air movement, no respiratory distress  Cardiovascular: normal rate, regular rhythm, no murmur  Abdomen: non-distended, no masses  Musculoskeletal: no joint deformity or tenderness  Extremities: no leg edema bilaterally  Skin: warm and dry, no rash or erythema    PULSE EXAM      Right      Left   Brachial     Radial     Femoral     Popliteal     Dorsalis Pedis     Posterior Tibial     (3=normal, 2=diminished, 1=barely palpable, 4=widened)    RADIOLOGY: [facility] today    Problem List Items Addressed This Visit     None          I reviewed the ultrasound and CAT scan images with the patient and her . I reviewed with them that I do recommend bilateral carotid endarterectomy staged and will start with the left as is there is more significant stenosis. They understand and agree. I reviewed the procedure with the patient. I discussed the risks, benefits, and alternatives of the procedure. The patient understands and consents. All questions were answered. Patient presents today for carotid angiography prior to proceeding with CEA. The risks, benefits, alternatives, and potential complications of the procedure, including the risks of bleeding, infection, injury to surrounding structures, need for additional procedures, and death were explained to the patient. All questions were answered. The patient understands and agrees to proceed with the procedure.      Electronically signed by Yuko Vargas MD on 2/23/2022 at 6:59 AM

## 2022-02-24 NOTE — PROGRESS NOTES
I reviewed the results of the arteriogram with the patient and her . The arteriogram shows that the stenosis in the left internal carotid is approximately 50% with calcifications of the carotid bulb but no hemodynamically significant stenosis. The right carotid artery however has a 90% stenosis at the origin of the internal carotid artery. With these results, I will plan for right carotid endarterectomy. The patient and  understand and agree to the plan.

## 2022-03-02 ENCOUNTER — TELEPHONE (OUTPATIENT)
Dept: VASCULAR SURGERY | Age: 62
End: 2022-03-02

## 2022-03-03 LAB
BLOOD BANK DISPENSE STATUS: NORMAL
BLOOD BANK PRODUCT CODE: NORMAL
BPU ID: NORMAL
DESCRIPTION BLOOD BANK: NORMAL

## 2022-03-07 ENCOUNTER — TELEPHONE (OUTPATIENT)
Dept: VASCULAR SURGERY | Age: 62
End: 2022-03-07

## 2022-03-08 ENCOUNTER — OFFICE VISIT (OUTPATIENT)
Dept: VASCULAR SURGERY | Age: 62
End: 2022-03-08

## 2022-03-08 VITALS — HEIGHT: 64 IN | BODY MASS INDEX: 30.73 KG/M2 | WEIGHT: 180 LBS

## 2022-03-08 DIAGNOSIS — I65.21 CAROTID STENOSIS, ASYMPTOMATIC, RIGHT: Primary | ICD-10-CM

## 2022-03-08 PROCEDURE — 99024 POSTOP FOLLOW-UP VISIT: CPT | Performed by: SURGERY

## 2022-03-08 NOTE — PROGRESS NOTES
Vascular Surgery Progress Note    Chief Complaint   Patient presents with    Post-Op Check     s/p cardio angio       Patient returns for post operative evaluation status post arteriogram.  The patient denies any unexpected problems since the procedure. Procedure Laterality Date    LUMBAR [address] [place] SURGERY      MOUTH SURGERY      US LYMPH NODE BIOPSY  5/8/2018    US LYMPH NODE BIOPSY       Physical Exam:  The femoral puncture site is soft without evidence of infection or hematoma. Heart rhythm is regular. Right      Left   Brachial     Radial     Femoral     Popliteal     Dorsalis Pedis     Posterior Tibial     (3=normal, 2=diminished, 1=barely palpable, 4=widened)    Problem List Items Addressed This Visit     None        I reviewed the results of the arteriogram with the patient and her . We will plan for right carotid endarterectomy. I reviewed the procedure with the patient. I discussed the risks, benefits, and alternatives of the procedure. The patient understands and consents. All questions were answered.

## 2022-03-22 PROBLEM — I65.21 CAROTID STENOSIS, ASYMPTOMATIC, RIGHT: Status: ACTIVE | Noted: 2022-03-22

## 2022-03-22 NOTE — PROGRESS NOTES
to be expected. You will be asked to rate your pain from 0-10. [x] Ask your nurse for your pain medication. MEDICATION INSTRUCTIONS:    [x] Bring a complete list of your medications, please write the last time you took the medicine, give this list to the nurse. [x] Take the following medications the morning of surgery with 1-2 ounces of water: None  [x] Stop herbal supplements and vitamins 5 days before your surgery. [x] Use your inhalers the morning of surgery. Bring your emergency inhaler with you day of surgery. Albuterol  [x] Follow physician instructions regarding any blood thinners you may be taking. WHAT TO EXPECT:    [x] The day of surgery you will be greeted and checked in by the [employer].  In addition, you will be registered in the [facility] by a Patient Access Representative. Please bring your photo ID and insurance card. A nurse will greet you in accordance to the time you are needed in the pre-op area to prepare you for surgery. Please do not be discouraged if you are not greeted in the order you arrive as there are many variables that are involved in patient preparation. Your patience is greatly appreciated as you wait for your nurse. Please bring in items such as: books, magazines, newspapers, electronics, or any other items  to occupy your time in the waiting area. [x]  Delays may occur with surgery and staff will make a sincere effort to keep you informed of delays. If any delays occur with your procedure, we apologize ahead of time for your inconvenience as we recognize the value of your time.

## 2022-03-28 ENCOUNTER — HOSPITAL ENCOUNTER (OUTPATIENT)
Dept: PREADMISSION TESTING | Age: 62
Setting detail: OUTPATIENT SURGERY
Discharge: HOME | DRG: 039 | End: 2022-03-28
Payer: COMMERCIAL

## 2022-03-28 VITALS
OXYGEN SATURATION: 99 % | TEMPERATURE: 97.3 F | DIASTOLIC BLOOD PRESSURE: 85 MMHG | BODY MASS INDEX: 31.58 KG/M2 | HEART RATE: 82 BPM | RESPIRATION RATE: 18 BRPM | SYSTOLIC BLOOD PRESSURE: 170 MMHG | WEIGHT: 185 LBS | HEIGHT: 64 IN

## 2022-03-28 DIAGNOSIS — Z01.812 PRE-OPERATIVE LABORATORY EXAMINATION: Primary | ICD-10-CM

## 2022-03-28 DIAGNOSIS — Z01.810 PREOP CARDIOVASCULAR EXAM: ICD-10-CM

## 2022-03-28 DIAGNOSIS — I65.21 CAROTID STENOSIS, ASYMPTOMATIC, RIGHT: ICD-10-CM

## 2022-03-28 LAB
ABO/RH: NORMAL
ANION GAP SERPL CALCULATED.3IONS-SCNC: 10 MMOL/L (ref 7–16)
ANTIBODY SCREEN: NORMAL
BUN BLDV-MCNC: 13 MG/DL (ref 6–23)
CALCIUM SERPL-MCNC: 9.2 MG/DL (ref 8.6–10.2)
CHLORIDE BLD-SCNC: 100 MMOL/L (ref 98–107)
CO2: 29 MMOL/L (ref 22–29)
CREAT SERPL-MCNC: 0.4 MG/DL (ref 0.5–1)
GFR AFRICAN AMERICAN: >60
GFR NON-AFRICAN AMERICAN: >60 ML/MIN/1.73
GLUCOSE BLD-MCNC: 155 MG/DL (ref 74–99)
HCT VFR BLD CALC: 42 % (ref 34–48)
HEMOGLOBIN: 14 G/DL (ref 11.5–15.5)
INR BLD: 1
MCH RBC QN AUTO: 29.7 PG (ref 26–35)
MCHC RBC AUTO-ENTMCNC: 33.3 % (ref 32–34.5)
MCV RBC AUTO: 89 FL (ref 80–99.9)
PDW BLD-RTO: 11.9 FL (ref 11.5–15)
PLATELET # BLD: 314 E9/L (ref 130–450)
PMV BLD AUTO: 9.8 FL (ref 7–12)
POTASSIUM REFLEX MAGNESIUM: 4.3 MMOL/L (ref 3.5–5)
PROTHROMBIN TIME: 11.2 SEC (ref 9.3–12.4)
RBC # BLD: 4.72 E12/L (ref 3.5–5.5)
SODIUM BLD-SCNC: 139 MMOL/L (ref 132–146)
WBC # BLD: 7.7 E9/L (ref 4.5–11.5)

## 2022-03-28 PROCEDURE — 85610 PROTHROMBIN TIME: CPT

## 2022-03-28 PROCEDURE — 80048 BASIC METABOLIC PNL TOTAL CA: CPT

## 2022-03-28 PROCEDURE — 86850 RBC ANTIBODY SCREEN: CPT

## 2022-03-28 PROCEDURE — 85027 COMPLETE CBC AUTOMATED: CPT

## 2022-03-28 PROCEDURE — 36415 COLL VENOUS BLD VENIPUNCTURE: CPT

## 2022-03-28 PROCEDURE — 86923 COMPATIBILITY TEST ELECTRIC: CPT

## 2022-03-28 PROCEDURE — 86901 BLOOD TYPING SEROLOGIC RH(D): CPT

## 2022-03-28 PROCEDURE — 86900 BLOOD TYPING SEROLOGIC ABO: CPT

## 2022-03-28 PROCEDURE — 87081 CULTURE SCREEN ONLY: CPT

## 2022-03-29 LAB — MRSA CULTURE ONLY: NORMAL

## 2022-03-30 ENCOUNTER — ANESTHESIA EVENT (OUTPATIENT)
Dept: OPERATING ROOM | Age: 62
DRG: 039 | End: 2022-03-30
Payer: COMMERCIAL

## 2022-03-31 ENCOUNTER — HOSPITAL ENCOUNTER (INPATIENT)
Age: 62
LOS: 1 days | Discharge: HOME | DRG: 039 | End: 2022-04-01
Attending: SURGERY | Admitting: SURGERY
Payer: COMMERCIAL

## 2022-03-31 ENCOUNTER — ANESTHESIA (OUTPATIENT)
Dept: OPERATING ROOM | Age: 62
DRG: 039 | End: 2022-03-31
Payer: COMMERCIAL

## 2022-03-31 VITALS — DIASTOLIC BLOOD PRESSURE: 116 MMHG | SYSTOLIC BLOOD PRESSURE: 206 MMHG | OXYGEN SATURATION: 97 %

## 2022-03-31 DIAGNOSIS — I65.21 CAROTID STENOSIS, ASYMPTOMATIC, RIGHT: ICD-10-CM

## 2022-03-31 DIAGNOSIS — Z01.810 PREOP CARDIOVASCULAR EXAM: Primary | ICD-10-CM

## 2022-03-31 DIAGNOSIS — G89.18 ACUTE POSTOPERATIVE PAIN: ICD-10-CM

## 2022-03-31 PROCEDURE — 6360000002 HC RX W HCPCS: Performed by: SURGERY

## 2022-03-31 PROCEDURE — 85347 COAGULATION TIME ACTIVATED: CPT

## 2022-03-31 PROCEDURE — 94640 AIRWAY INHALATION TREATMENT: CPT

## 2022-03-31 PROCEDURE — 35301 RECHANNELING OF ARTERY: CPT | Performed by: SURGERY

## 2022-03-31 PROCEDURE — 3700000001 HC ADD 15 MINUTES (ANESTHESIA): Performed by: SURGERY

## 2022-03-31 PROCEDURE — 6370000000 HC RX 637 (ALT 250 FOR IP): Performed by: SURGERY

## 2022-03-31 PROCEDURE — 2580000003 HC RX 258: Performed by: NURSE PRACTITIONER

## 2022-03-31 PROCEDURE — 3600000015 HC SURGERY LEVEL 5 ADDTL 15MIN: Performed by: SURGERY

## 2022-03-31 PROCEDURE — 2500000003 HC RX 250 WO HCPCS: Performed by: NURSE ANESTHETIST, CERTIFIED REGISTERED

## 2022-03-31 PROCEDURE — 2500000003 HC RX 250 WO HCPCS: Performed by: SURGERY

## 2022-03-31 PROCEDURE — 2700000000 HC OXYGEN THERAPY PER DAY

## 2022-03-31 PROCEDURE — 88311 DECALCIFY TISSUE: CPT

## 2022-03-31 PROCEDURE — 3600000005 HC SURGERY LEVEL 5 BASE: Performed by: SURGERY

## 2022-03-31 PROCEDURE — 7100000000 HC PACU RECOVERY - FIRST 15 MIN

## 2022-03-31 PROCEDURE — A4217 STERILE WATER/SALINE, 500 ML: HCPCS | Performed by: SURGERY

## 2022-03-31 PROCEDURE — 7100000001 HC PACU RECOVERY - ADDTL 15 MIN

## 2022-03-31 PROCEDURE — 3700000000 HC ANESTHESIA ATTENDED CARE: Performed by: SURGERY

## 2022-03-31 PROCEDURE — 2580000003 HC RX 258: Performed by: NURSE ANESTHETIST, CERTIFIED REGISTERED

## 2022-03-31 PROCEDURE — 88304 TISSUE EXAM BY PATHOLOGIST: CPT

## 2022-03-31 PROCEDURE — 2580000003 HC RX 258: Performed by: SURGERY

## 2022-03-31 PROCEDURE — 6360000002 HC RX W HCPCS: Performed by: NURSE ANESTHETIST, CERTIFIED REGISTERED

## 2022-03-31 PROCEDURE — 2709999900 HC NON-CHARGEABLE SUPPLY: Performed by: SURGERY

## 2022-03-31 PROCEDURE — 2000000000 HC ICU R&B

## 2022-03-31 PROCEDURE — 03CK0ZZ EXTIRPATION OF MATTER FROM RIGHT INTERNAL CAROTID ARTERY, OPEN APPROACH: ICD-10-PCS | Performed by: SURGERY

## 2022-03-31 PROCEDURE — 99232 SBSQ HOSP IP/OBS MODERATE 35: CPT | Performed by: NURSE PRACTITIONER

## 2022-03-31 RX ORDER — SODIUM CHLORIDE 0.9 % (FLUSH) 0.9 %
10 SYRINGE (ML) INJECTION PRN
Status: DISCONTINUED | OUTPATIENT
Start: 2022-03-31 | End: 2022-03-31 | Stop reason: HOSPADM

## 2022-03-31 RX ORDER — CEFAZOLIN SODIUM 1 G/3ML
INJECTION, POWDER, FOR SOLUTION INTRAMUSCULAR; INTRAVENOUS PRN
Status: DISCONTINUED | OUTPATIENT
Start: 2022-03-31 | End: 2022-03-31 | Stop reason: SDUPTHER

## 2022-03-31 RX ORDER — PROTAMINE SULFATE 10 MG/ML
INJECTION, SOLUTION INTRAVENOUS PRN
Status: DISCONTINUED | OUTPATIENT
Start: 2022-03-31 | End: 2022-03-31 | Stop reason: SDUPTHER

## 2022-03-31 RX ORDER — VECURONIUM BROMIDE 1 MG/ML
INJECTION, POWDER, LYOPHILIZED, FOR SOLUTION INTRAVENOUS PRN
Status: DISCONTINUED | OUTPATIENT
Start: 2022-03-31 | End: 2022-03-31 | Stop reason: SDUPTHER

## 2022-03-31 RX ORDER — SODIUM CHLORIDE 9 MG/ML
INJECTION, SOLUTION INTRAVENOUS PRN
Status: DISCONTINUED | OUTPATIENT
Start: 2022-03-31 | End: 2022-03-31 | Stop reason: HOSPADM

## 2022-03-31 RX ORDER — SODIUM CHLORIDE 0.9 % (FLUSH) 0.9 %
5-40 SYRINGE (ML) INJECTION PRN
Status: DISCONTINUED | OUTPATIENT
Start: 2022-03-31 | End: 2022-03-31 | Stop reason: HOSPADM

## 2022-03-31 RX ORDER — HYDROCHLOROTHIAZIDE 12.5 MG/1
12.5 TABLET ORAL DAILY
Status: DISCONTINUED | OUTPATIENT
Start: 2022-03-31 | End: 2022-04-01 | Stop reason: HOSPADM

## 2022-03-31 RX ORDER — NITROGLYCERIN 5 MG/ML
INJECTION, SOLUTION INTRAVENOUS PRN
Status: DISCONTINUED | OUTPATIENT
Start: 2022-03-31 | End: 2022-03-31 | Stop reason: SDUPTHER

## 2022-03-31 RX ORDER — SODIUM CHLORIDE 9 MG/ML
INJECTION, SOLUTION INTRAVENOUS CONTINUOUS PRN
Status: DISCONTINUED | OUTPATIENT
Start: 2022-03-31 | End: 2022-03-31 | Stop reason: SDUPTHER

## 2022-03-31 RX ORDER — ESMOLOL HYDROCHLORIDE 10 MG/ML
INJECTION INTRAVENOUS PRN
Status: DISCONTINUED | OUTPATIENT
Start: 2022-03-31 | End: 2022-03-31 | Stop reason: SDUPTHER

## 2022-03-31 RX ORDER — MEPERIDINE HYDROCHLORIDE 25 MG/ML
12.5 INJECTION INTRAMUSCULAR; INTRAVENOUS; SUBCUTANEOUS EVERY 5 MIN PRN
Status: DISCONTINUED | OUTPATIENT
Start: 2022-03-31 | End: 2022-03-31 | Stop reason: HOSPADM

## 2022-03-31 RX ORDER — ONDANSETRON 2 MG/ML
INJECTION INTRAMUSCULAR; INTRAVENOUS PRN
Status: DISCONTINUED | OUTPATIENT
Start: 2022-03-31 | End: 2022-03-31 | Stop reason: SDUPTHER

## 2022-03-31 RX ORDER — FENTANYL CITRATE 50 UG/ML
25 INJECTION, SOLUTION INTRAMUSCULAR; INTRAVENOUS EVERY 5 MIN PRN
Status: DISCONTINUED | OUTPATIENT
Start: 2022-03-31 | End: 2022-03-31 | Stop reason: HOSPADM

## 2022-03-31 RX ORDER — FENTANYL CITRATE 50 UG/ML
INJECTION, SOLUTION INTRAMUSCULAR; INTRAVENOUS PRN
Status: DISCONTINUED | OUTPATIENT
Start: 2022-03-31 | End: 2022-03-31 | Stop reason: SDUPTHER

## 2022-03-31 RX ORDER — LOSARTAN POTASSIUM AND HYDROCHLOROTHIAZIDE 12.5; 1 MG/1; MG/1
1 TABLET ORAL DAILY
Status: DISCONTINUED | OUTPATIENT
Start: 2022-04-01 | End: 2022-03-31 | Stop reason: SDUPTHER

## 2022-03-31 RX ORDER — SODIUM CHLORIDE 0.9 % (FLUSH) 0.9 %
10 SYRINGE (ML) INJECTION EVERY 12 HOURS SCHEDULED
Status: DISCONTINUED | OUTPATIENT
Start: 2022-03-31 | End: 2022-03-31 | Stop reason: HOSPADM

## 2022-03-31 RX ORDER — HEPARIN SODIUM 1000 [USP'U]/ML
INJECTION, SOLUTION INTRAVENOUS; SUBCUTANEOUS PRN
Status: DISCONTINUED | OUTPATIENT
Start: 2022-03-31 | End: 2022-03-31 | Stop reason: SDUPTHER

## 2022-03-31 RX ORDER — MORPHINE SULFATE 2 MG/ML
1 INJECTION, SOLUTION INTRAMUSCULAR; INTRAVENOUS
Status: DISCONTINUED | OUTPATIENT
Start: 2022-03-31 | End: 2022-04-01

## 2022-03-31 RX ORDER — SODIUM CHLORIDE 0.9 % (FLUSH) 0.9 %
5-40 SYRINGE (ML) INJECTION EVERY 12 HOURS SCHEDULED
Status: DISCONTINUED | OUTPATIENT
Start: 2022-03-31 | End: 2022-03-31 | Stop reason: HOSPADM

## 2022-03-31 RX ORDER — SODIUM CHLORIDE 9 MG/ML
25 INJECTION, SOLUTION INTRAVENOUS PRN
Status: DISCONTINUED | OUTPATIENT
Start: 2022-03-31 | End: 2022-04-01 | Stop reason: HOSPADM

## 2022-03-31 RX ORDER — FENTANYL CITRATE 50 UG/ML
50 INJECTION, SOLUTION INTRAMUSCULAR; INTRAVENOUS EVERY 5 MIN PRN
Status: DISCONTINUED | OUTPATIENT
Start: 2022-03-31 | End: 2022-03-31 | Stop reason: HOSPADM

## 2022-03-31 RX ORDER — LIDOCAINE HYDROCHLORIDE 20 MG/ML
INJECTION, SOLUTION INTRAVENOUS PRN
Status: DISCONTINUED | OUTPATIENT
Start: 2022-03-31 | End: 2022-03-31 | Stop reason: SDUPTHER

## 2022-03-31 RX ORDER — ASPIRIN 81 MG/1
81 TABLET, CHEWABLE ORAL DAILY
Status: DISCONTINUED | OUTPATIENT
Start: 2022-04-01 | End: 2022-04-01 | Stop reason: HOSPADM

## 2022-03-31 RX ORDER — SODIUM CHLORIDE 9 MG/ML
25 INJECTION, SOLUTION INTRAVENOUS PRN
Status: DISCONTINUED | OUTPATIENT
Start: 2022-03-31 | End: 2022-03-31 | Stop reason: HOSPADM

## 2022-03-31 RX ORDER — GLYCOPYRROLATE 1 MG/5 ML
SYRINGE (ML) INTRAVENOUS PRN
Status: DISCONTINUED | OUTPATIENT
Start: 2022-03-31 | End: 2022-03-31 | Stop reason: SDUPTHER

## 2022-03-31 RX ORDER — SODIUM CHLORIDE 9 MG/ML
INJECTION, SOLUTION INTRAVENOUS CONTINUOUS
Status: ACTIVE | OUTPATIENT
Start: 2022-03-31 | End: 2022-03-31

## 2022-03-31 RX ORDER — BUPIVACAINE HYDROCHLORIDE 2.5 MG/ML
INJECTION, SOLUTION EPIDURAL; INFILTRATION; INTRACAUDAL PRN
Status: DISCONTINUED | OUTPATIENT
Start: 2022-03-31 | End: 2022-03-31 | Stop reason: HOSPADM

## 2022-03-31 RX ORDER — MORPHINE SULFATE 2 MG/ML
2 INJECTION, SOLUTION INTRAMUSCULAR; INTRAVENOUS
Status: DISCONTINUED | OUTPATIENT
Start: 2022-03-31 | End: 2022-04-01

## 2022-03-31 RX ORDER — ALBUTEROL SULFATE 2.5 MG/3ML
2.5 SOLUTION RESPIRATORY (INHALATION) 4 TIMES DAILY
Status: DISCONTINUED | OUTPATIENT
Start: 2022-03-31 | End: 2022-04-01 | Stop reason: HOSPADM

## 2022-03-31 RX ORDER — HYDRALAZINE HYDROCHLORIDE 20 MG/ML
INJECTION INTRAMUSCULAR; INTRAVENOUS PRN
Status: DISCONTINUED | OUTPATIENT
Start: 2022-03-31 | End: 2022-03-31 | Stop reason: SDUPTHER

## 2022-03-31 RX ORDER — SODIUM CHLORIDE 0.9 % (FLUSH) 0.9 %
10 SYRINGE (ML) INJECTION PRN
Status: DISCONTINUED | OUTPATIENT
Start: 2022-03-31 | End: 2022-04-01 | Stop reason: HOSPADM

## 2022-03-31 RX ORDER — MIDAZOLAM HYDROCHLORIDE 1 MG/ML
INJECTION INTRAMUSCULAR; INTRAVENOUS PRN
Status: DISCONTINUED | OUTPATIENT
Start: 2022-03-31 | End: 2022-03-31 | Stop reason: SDUPTHER

## 2022-03-31 RX ORDER — ACETAMINOPHEN 325 MG/1
650 TABLET ORAL EVERY 4 HOURS PRN
Status: DISCONTINUED | OUTPATIENT
Start: 2022-03-31 | End: 2022-04-01 | Stop reason: HOSPADM

## 2022-03-31 RX ORDER — LOSARTAN POTASSIUM 50 MG/1
100 TABLET ORAL DAILY
Status: DISCONTINUED | OUTPATIENT
Start: 2022-03-31 | End: 2022-04-01 | Stop reason: HOSPADM

## 2022-03-31 RX ORDER — DEXAMETHASONE SODIUM PHOSPHATE 10 MG/ML
INJECTION INTRAMUSCULAR; INTRAVENOUS PRN
Status: DISCONTINUED | OUTPATIENT
Start: 2022-03-31 | End: 2022-03-31 | Stop reason: SDUPTHER

## 2022-03-31 RX ORDER — OXYCODONE HYDROCHLORIDE 5 MG/1
5 TABLET ORAL
Status: DISCONTINUED | OUTPATIENT
Start: 2022-03-31 | End: 2022-04-01 | Stop reason: HOSPADM

## 2022-03-31 RX ORDER — ONDANSETRON 2 MG/ML
4 INJECTION INTRAMUSCULAR; INTRAVENOUS
Status: DISCONTINUED | OUTPATIENT
Start: 2022-03-31 | End: 2022-03-31 | Stop reason: HOSPADM

## 2022-03-31 RX ORDER — ROSUVASTATIN CALCIUM 20 MG/1
20 TABLET, COATED ORAL NIGHTLY
Status: DISCONTINUED | OUTPATIENT
Start: 2022-04-01 | End: 2022-04-01 | Stop reason: HOSPADM

## 2022-03-31 RX ORDER — NEOSTIGMINE METHYLSULFATE 1 MG/ML
INJECTION, SOLUTION INTRAVENOUS PRN
Status: DISCONTINUED | OUTPATIENT
Start: 2022-03-31 | End: 2022-03-31 | Stop reason: SDUPTHER

## 2022-03-31 RX ORDER — METOPROLOL TARTRATE 5 MG/5ML
5 INJECTION INTRAVENOUS EVERY 6 HOURS
Status: COMPLETED | OUTPATIENT
Start: 2022-03-31 | End: 2022-03-31

## 2022-03-31 RX ORDER — SODIUM CHLORIDE 0.9 % (FLUSH) 0.9 %
10 SYRINGE (ML) INJECTION EVERY 12 HOURS SCHEDULED
Status: DISCONTINUED | OUTPATIENT
Start: 2022-03-31 | End: 2022-04-01 | Stop reason: HOSPADM

## 2022-03-31 RX ORDER — LIDOCAINE HYDROCHLORIDE 10 MG/ML
INJECTION, SOLUTION INFILTRATION; PERINEURAL PRN
Status: DISCONTINUED | OUTPATIENT
Start: 2022-03-31 | End: 2022-03-31 | Stop reason: HOSPADM

## 2022-03-31 RX ORDER — PROPOFOL 10 MG/ML
INJECTION, EMULSION INTRAVENOUS PRN
Status: DISCONTINUED | OUTPATIENT
Start: 2022-03-31 | End: 2022-03-31 | Stop reason: SDUPTHER

## 2022-03-31 RX ADMIN — ESMOLOL HYDROCHLORIDE 20 MG: 10 INJECTION, SOLUTION INTRAVENOUS at 09:11

## 2022-03-31 RX ADMIN — NITROGLYCERIN 100 MCG: 5 INJECTION, SOLUTION INTRAVENOUS at 09:14

## 2022-03-31 RX ADMIN — ALBUTEROL SULFATE 2.5 MG: 2.5 SOLUTION RESPIRATORY (INHALATION) at 16:22

## 2022-03-31 RX ADMIN — VECURONIUM BROMIDE 2 MG: 1 INJECTION, POWDER, LYOPHILIZED, FOR SOLUTION INTRAVENOUS at 08:28

## 2022-03-31 RX ADMIN — METOPROLOL TARTRATE 5 MG: 1 INJECTION, SOLUTION INTRAVENOUS at 15:06

## 2022-03-31 RX ADMIN — HYDRALAZINE HYDROCHLORIDE 5 MG: 20 INJECTION INTRAMUSCULAR; INTRAVENOUS at 09:12

## 2022-03-31 RX ADMIN — PROTAMINE SULFATE 30 MG: 10 INJECTION, SOLUTION INTRAVENOUS at 08:54

## 2022-03-31 RX ADMIN — FENTANYL CITRATE 50 MCG: 50 INJECTION, SOLUTION INTRAMUSCULAR; INTRAVENOUS at 08:00

## 2022-03-31 RX ADMIN — NITROGLYCERIN 100 MCG: 5 INJECTION, SOLUTION INTRAVENOUS at 09:21

## 2022-03-31 RX ADMIN — NITROGLYCERIN 100 MCG: 5 INJECTION, SOLUTION INTRAVENOUS at 09:08

## 2022-03-31 RX ADMIN — Medication 0.6 MG: at 09:16

## 2022-03-31 RX ADMIN — SODIUM CHLORIDE: 9 INJECTION, SOLUTION INTRAVENOUS at 07:04

## 2022-03-31 RX ADMIN — HEPARIN SODIUM 7000 UNITS: 1000 INJECTION INTRAVENOUS; SUBCUTANEOUS at 08:14

## 2022-03-31 RX ADMIN — MORPHINE SULFATE 1 MG: 2 INJECTION, SOLUTION INTRAMUSCULAR; INTRAVENOUS at 18:05

## 2022-03-31 RX ADMIN — ONDANSETRON 4 MG: 2 INJECTION INTRAMUSCULAR; INTRAVENOUS at 08:57

## 2022-03-31 RX ADMIN — HYDRALAZINE HYDROCHLORIDE 5 MG: 20 INJECTION INTRAMUSCULAR; INTRAVENOUS at 09:09

## 2022-03-31 RX ADMIN — LOSARTAN POTASSIUM 100 MG: 50 TABLET, FILM COATED ORAL at 10:18

## 2022-03-31 RX ADMIN — NITROGLYCERIN 100 MCG: 5 INJECTION, SOLUTION INTRAVENOUS at 09:23

## 2022-03-31 RX ADMIN — MORPHINE SULFATE 1 MG: 2 INJECTION, SOLUTION INTRAMUSCULAR; INTRAVENOUS at 10:41

## 2022-03-31 RX ADMIN — MIDAZOLAM 1 MG: 1 INJECTION INTRAMUSCULAR; INTRAVENOUS at 07:15

## 2022-03-31 RX ADMIN — Medication 10 ML: at 20:53

## 2022-03-31 RX ADMIN — DEXAMETHASONE SODIUM PHOSPHATE 10 MG: 10 INJECTION INTRAMUSCULAR; INTRAVENOUS at 08:00

## 2022-03-31 RX ADMIN — MIDAZOLAM 1 MG: 1 INJECTION INTRAMUSCULAR; INTRAVENOUS at 07:20

## 2022-03-31 RX ADMIN — NITROGLYCERIN 100 MCG: 5 INJECTION, SOLUTION INTRAVENOUS at 09:16

## 2022-03-31 RX ADMIN — ALBUTEROL SULFATE 2.5 MG: 2.5 SOLUTION RESPIRATORY (INHALATION) at 12:01

## 2022-03-31 RX ADMIN — CEFAZOLIN SODIUM 2000 MG: 1 INJECTION, POWDER, FOR SOLUTION INTRAMUSCULAR; INTRAVENOUS at 07:19

## 2022-03-31 RX ADMIN — LIDOCAINE HYDROCHLORIDE 60 MG: 20 INJECTION, SOLUTION INTRAVENOUS at 07:40

## 2022-03-31 RX ADMIN — HYDROCHLOROTHIAZIDE 12.5 MG: 12.5 TABLET ORAL at 10:18

## 2022-03-31 RX ADMIN — PROPOFOL 200 MG: 10 INJECTION, EMULSION INTRAVENOUS at 07:40

## 2022-03-31 RX ADMIN — NITROGLYCERIN 50 MCG: 5 INJECTION, SOLUTION INTRAVENOUS at 09:04

## 2022-03-31 RX ADMIN — HEPARIN SODIUM 3000 UNITS: 1000 INJECTION INTRAVENOUS; SUBCUTANEOUS at 08:24

## 2022-03-31 RX ADMIN — NITROGLYCERIN 50 MCG: 5 INJECTION, SOLUTION INTRAVENOUS at 09:05

## 2022-03-31 RX ADMIN — NITROGLYCERIN 100 MCG: 5 INJECTION, SOLUTION INTRAVENOUS at 09:09

## 2022-03-31 RX ADMIN — ALBUTEROL SULFATE 2.5 MG: 2.5 SOLUTION RESPIRATORY (INHALATION) at 20:18

## 2022-03-31 RX ADMIN — ESMOLOL HYDROCHLORIDE 30 MG: 10 INJECTION, SOLUTION INTRAVENOUS at 09:17

## 2022-03-31 RX ADMIN — ESMOLOL HYDROCHLORIDE 20 MG: 10 INJECTION, SOLUTION INTRAVENOUS at 09:16

## 2022-03-31 RX ADMIN — VECURONIUM BROMIDE 5 MG: 1 INJECTION, POWDER, LYOPHILIZED, FOR SOLUTION INTRAVENOUS at 07:40

## 2022-03-31 RX ADMIN — FENTANYL CITRATE 100 MCG: 50 INJECTION, SOLUTION INTRAMUSCULAR; INTRAVENOUS at 07:40

## 2022-03-31 RX ADMIN — OXYCODONE 5 MG: 5 TABLET ORAL at 10:23

## 2022-03-31 RX ADMIN — PHENYLEPHRINE HYDROCHLORIDE 25 MCG/MIN: 10 INJECTION INTRAVENOUS at 08:31

## 2022-03-31 RX ADMIN — Medication 3 MG: at 09:16

## 2022-03-31 RX ADMIN — METOPROLOL TARTRATE 5 MG: 1 INJECTION, SOLUTION INTRAVENOUS at 20:55

## 2022-03-31 ASSESSMENT — PULMONARY FUNCTION TESTS
PIF_VALUE: 23
PIF_VALUE: 2
PIF_VALUE: 11
PIF_VALUE: 23
PIF_VALUE: 3
PIF_VALUE: 24
PIF_VALUE: 23
PIF_VALUE: 24
PIF_VALUE: 23
PIF_VALUE: 23
PIF_VALUE: 0
PIF_VALUE: 25
PIF_VALUE: 24
PIF_VALUE: 23
PIF_VALUE: 21
PIF_VALUE: 24
PIF_VALUE: 1
PIF_VALUE: 23
PIF_VALUE: 24
PIF_VALUE: 1
PIF_VALUE: 21
PIF_VALUE: 24
PIF_VALUE: 24
PIF_VALUE: 1
PIF_VALUE: 2
PIF_VALUE: 11
PIF_VALUE: 23
PIF_VALUE: 24
PIF_VALUE: 4
PIF_VALUE: 23
PIF_VALUE: 23
PIF_VALUE: 1
PIF_VALUE: 22
PIF_VALUE: 5
PIF_VALUE: 23
PIF_VALUE: 24
PIF_VALUE: 26
PIF_VALUE: 21
PIF_VALUE: 24
PIF_VALUE: 21
PIF_VALUE: 24
PIF_VALUE: 2
PIF_VALUE: 23
PIF_VALUE: 25
PIF_VALUE: 24
PIF_VALUE: 25
PIF_VALUE: 21
PIF_VALUE: 2
PIF_VALUE: 21
PIF_VALUE: 25
PIF_VALUE: 24
PIF_VALUE: 2
PIF_VALUE: 24
PIF_VALUE: 4
PIF_VALUE: 11
PIF_VALUE: 24
PIF_VALUE: 42
PIF_VALUE: 23
PIF_VALUE: 24
PIF_VALUE: 1
PIF_VALUE: 2
PIF_VALUE: 1
PIF_VALUE: 1
PIF_VALUE: 2
PIF_VALUE: 23
PIF_VALUE: 25
PIF_VALUE: 15
PIF_VALUE: 24
PIF_VALUE: 1
PIF_VALUE: 2
PIF_VALUE: 1
PIF_VALUE: 24
PIF_VALUE: 23
PIF_VALUE: 24
PIF_VALUE: 25
PIF_VALUE: 1
PIF_VALUE: 21
PIF_VALUE: 24
PIF_VALUE: 23
PIF_VALUE: 25
PIF_VALUE: 21
PIF_VALUE: 23
PIF_VALUE: 1
PIF_VALUE: 23
PIF_VALUE: 22
PIF_VALUE: 24
PIF_VALUE: 11
PIF_VALUE: 1
PIF_VALUE: 24
PIF_VALUE: 26
PIF_VALUE: 25
PIF_VALUE: 25
PIF_VALUE: 21
PIF_VALUE: 22
PIF_VALUE: 24
PIF_VALUE: 1
PIF_VALUE: 23
PIF_VALUE: 24
PIF_VALUE: 25
PIF_VALUE: 4
PIF_VALUE: 24
PIF_VALUE: 23
PIF_VALUE: 26
PIF_VALUE: 23
PIF_VALUE: 24
PIF_VALUE: 23

## 2022-03-31 ASSESSMENT — PAIN SCALES - GENERAL
PAINLEVEL_OUTOF10: 0
PAINLEVEL_OUTOF10: 2
PAINLEVEL_OUTOF10: 0
PAINLEVEL_OUTOF10: 6
PAINLEVEL_OUTOF10: 6
PAINLEVEL_OUTOF10: 0
PAINLEVEL_OUTOF10: 0
PAINLEVEL_OUTOF10: 6
PAINLEVEL_OUTOF10: 0
PAINLEVEL_OUTOF10: 2
PAINLEVEL_OUTOF10: 6

## 2022-03-31 ASSESSMENT — PAIN DESCRIPTION - LOCATION
LOCATION: NECK
LOCATION: NECK;JAW

## 2022-03-31 ASSESSMENT — ENCOUNTER SYMPTOMS: SHORTNESS OF BREATH: 1

## 2022-03-31 ASSESSMENT — PAIN DESCRIPTION - PAIN TYPE
TYPE: ACUTE PAIN;SURGICAL PAIN
TYPE: ACUTE PAIN;SURGICAL PAIN
TYPE: SURGICAL PAIN;ACUTE PAIN
TYPE: SURGICAL PAIN;ACUTE PAIN

## 2022-03-31 ASSESSMENT — PAIN DESCRIPTION - ORIENTATION
ORIENTATION: RIGHT

## 2022-03-31 ASSESSMENT — PAIN DESCRIPTION - DESCRIPTORS
DESCRIPTORS: ACHING

## 2022-03-31 ASSESSMENT — PAIN - FUNCTIONAL ASSESSMENT: PAIN_FUNCTIONAL_ASSESSMENT: 0-10

## 2022-03-31 ASSESSMENT — LIFESTYLE VARIABLES: SMOKING_STATUS: 0

## 2022-03-31 ASSESSMENT — COPD QUESTIONNAIRES: CAT_SEVERITY: MODERATE

## 2022-03-31 NOTE — OP NOTE
Operative Note      Patient: Risa Xavier  YOB: 1960  MRN: [id number]    Date of Procedure: 3/31/2022    Pre-Op Diagnosis: RIGHT CAROTID STENOSIS, asymptomatic    Post-Op Diagnosis: Same       Procedure(s):  RIGHT CAROTID ENDARTERECTOMY    Surgeon(s):  Jovita Keane MD    Assistant:   Surgical Assistant: Louise Frazier    Anesthesia: General    Estimated Blood Loss (mL): Minimal    Complications: None    Specimens:   ID Type Source Tests Collected by Time Destination   A : RIGHT CAROTID PLAQUE Tissue Tissue SURGICAL PATHOLOGY Jovita Keane MD 3/31/2022 0630        Implants:  * No implants in log *      Drains:   Urethral Catheter Non-latex 16 fr (Active)       Findings:     Detailed Description of Procedure: The patient was identified and the procedure was confirmed. The right neck was prepped and draped in the usual sterile fashion. A skin incision was made along the anterior border of the sternocleidomastoid muscle and carried down through the subcutaneous tissue. Dissection continued through the platysma and along the anterior border of the sternocleidomastoid muscle. The common facial vein was divided between silk ties. The common carotid artery was identified and dissected free from the surrounding tissues. It was surrounded proximally in the soft portion with an umbilical tape. The vagus nerve was deep to the artery and preserved. Dissection continued along the carotid artery and the external carotid artery and its superior thyroid branch were dissected free from the surrounding tissues and surrounded with vessel loops for control. The patient was then heparinized, maintaining an activated clotting time greater than 300 seconds. Dissection continued along the internal carotid artery, which was freed from the surrounding tissues and surrounded with a vessel loop for control. The hypoglossal nerve was identified and preserved.     The vessel loops on the external carotid artery branches were controlled and the internal carotid artery was clamped. The common carotid artery was clamped and then the internal carotid artery was transected at its origin. There was a 80% stenosis in the origin of the internal carotid artery. The arteriotomy was continued medially along the internal carotid artery and laterally along the bulb and common carotid artery. An eversion endarterectomy was performed of the internal carotid artery obtaining a smooth end point. A standard endarterectomy was then performed of the common carotid artery obtaining smooth end points. Loose fibrinous debris was removed from the vessel bed, which was flushed with heparinized saline solution. The internal carotid artery was then reimplanted onto the common carotid artery using a running 6-0 Prolene suture. Prior to completing the closure, the vessels were flushed and back-bled and the closure was completed. Flow was restored initially through the external carotid artery followed by the internal carotid artery. Flow through the vessels was confirmed with the handheld Doppler probe. The patient was given protamine and hemostasis was obtained. The incision was irrigated with antibiotic saline solution. The platysma was approximated with interrupted 3-0 Vicryl sutures and 0.25% Marcaine was injected into the subcutaneous tissue surrounding the incision. The skin was closed with a subcuticular Vicryl stitch and skin adhesive was applied to the skin incision in the operating room. Needle, sponge, and instrument counts were reported as correct x2. The patient tolerated the procedure and was transferred to the Cardiovascular ICU in satisfactory condition.       Electronically signed by Philomena Garcia MD on 3/31/2022 at 8:57 AM

## 2022-03-31 NOTE — PLAN OF CARE
Problem: Pain:  Goal: Pain level will decrease  Description: Pain level will decrease  Note: Monitor pain adm medications as ordered reposition pt as henrique monitor inc site for edema  Goal: Control of acute pain  Description: Control of acute pain  Outcome: Met This Shift  Goal: Control of chronic pain  Description: Control of chronic pain  Outcome: Met This Shift     Problem: Infection - Surgical Site:  Goal: Will show no infection signs and symptoms  Description: Will show no infection signs and symptoms  Outcome: Met This Shift  Note: Monitor inc site for redness and drainage adm meds as ordered adm wbc count

## 2022-03-31 NOTE — PROGRESS NOTES
Vascular Surgery Progress Note    CC: f/u CEA    HISTORY:  The patient is awake, alert, and oriented. Pain controlled. IMPRESSION:  POD # 0 s/p R CEA. PLAN:  Start clears. Advance diet as tolerated. Patient Active Problem List   Diagnosis Code    Essential hypertension I10    Hyperlipidemia E78.5    Chronic obstructive pulmonary disease ([facility] [address] J44.9    Carotid stenosis, asymptomatic, bilateral I65.23    Palpitations R00.2    Carotid stenosis, asymptomatic, left I65.22    PVD (peripheral vascular disease) ([facility] I73.9    Carotid stenosis, asymptomatic, right I65.21    Carotid stenosis, right I65.21       Current Medications:    sodium chloride 125 mL/hr at 03/31/22 1400    sodium chloride      niCARdipine 3 mg/hr (03/31/22 1400)      sodium chloride flush, sodium chloride, acetaminophen, oxyCODONE, morphine **OR** morphine    albuterol  2.5 mg Nebulization 4x daily    [START ON 4/1/2022] aspirin  81 mg Oral Daily    [START ON 4/1/2022] rosuvastatin  20 mg Oral Nightly    sodium chloride flush  10 mL IntraVENous 2 times per day    losartan  100 mg Oral Daily    And    hydroCHLOROthiazide  12.5 mg Oral Daily    metoprolol  5 mg IntraVENous Q6H          PHYSICAL EXAM:    Vitals:    03/31/22 1400   BP: (!) 150/75   Pulse: 111   Resp: 15   Temp:    SpO2: 98%     CONSTITUTIONAL:  awake, alert, cooperative, no apparent distress  LUNGS:  no increased work of breathing, good air exchange and clear to auscultation  CARDIOVASCULAR:  tachycardic with regular rhythm  Incision CDI, mild swelling. Tongue midline.    equal.    LABS:    Lab Results   Component Value Date    WBC 7.7 03/28/2022    HGB 14.0 03/28/2022    HCT 42.0 03/28/2022     03/28/2022    PROTIME 11.2 03/28/2022    INR 1.0 03/28/2022    K 4.3 03/28/2022    BUN 13 03/28/2022    CREATININE 0.4 (L) 03/28/2022       RADIOLOGY:

## 2022-03-31 NOTE — ANESTHESIA PRE PROCEDURE
Department of Anesthesiology  Preprocedure Note       Name:  Kike Muniz   Age:  59 y.o.  :  1960                                          MRN:  [id number]         Date:  3/31/2022      Surgeon: Av Swartz):  Johnna Ahmadi MD    Procedure: Procedure(s):  RIGHT CAROTID ENDARTERECTOMY    Medications prior to admission:   Prior to Admission medications    Medication Sig Start Date End Date Taking?  Authorizing Provider   rosuvastatin (CRESTOR) 20 MG tablet Take 1 tablet by mouth daily 22   Serafin Horta MD   losartan-hydroCHLOROthiazide [facility]) 100-12.5 MG per tablet TAKE 1 TABLET DAILY 12/15/21   Rosana Avalos MD   albuterol sulfate  (90 Base) MCG/ACT inhaler inhale 2 puffs by mouth INTO THE LUNGS SIX TIMES PER DAY if needed 21   Jamie Ervin APRN - NP   Multiple Vitamin (MULTI VITAMIN DAILY PO) Take 1 tablet by mouth daily     Historical Provider, MD   aspirin 81 MG tablet Take 81 mg by mouth daily    Historical Provider, MD       Current medications:    Current Facility-Administered Medications   Medication Dose Route Frequency Provider Last Rate Last Admin    sodium chloride flush 0.9 % injection 10 mL  10 mL IntraVENous 2 times per day Ivone Buoy, APRN - CNP        sodium chloride flush 0.9 % injection 10 mL  10 mL IntraVENous PRN Ivone Buoy, APRN - CNP        0.9 % sodium chloride infusion  25 mL IntraVENous PRN Ivone Ike, APRN - CNP        ceFAZolin (ANCEF) 2000 mg in sterile water 20 mL IV syringe  2,000 mg IntraVENous [facility]           Allergies:  No Known Allergies    Problem List:    Patient Active Problem List   Diagnosis Code    Essential hypertension I10    Hyperlipidemia E78.5    [facility] J44.9    Carotid stenosis, asymptomatic, bilateral I65.23    Palpitations R00.2    Carotid stenosis, asymptomatic, left I65.22    PVD (peripheral vascular disease) ([facility] I73.9    Carotid stenosis, asymptomatic, right I65.21    Carotid stenosis, right I65.21       Past Medical History:        Diagnosis Date    Allergic rhinitis     Carotid artery stenosis     Hypertension     Vitamin B12 deficiency        Past Surgical History:        Procedure Laterality Date    LUMBAR DISC SURGERY      MOUTH SURGERY      US LYMPH NODE BIOPSY  2018    US LYMPH NODE BIOPSY       Social History:    Social History     Tobacco Use    Smoking status: Former Smoker     Packs/day: 1.00     Years: 25.00     Pack years: 25.00     Types: Cigarettes     Start date: 65     Quit date:      Years since quittin.2    Smokeless tobacco: Never Used   Substance Use Topics    Alcohol use: Yes     Comment: occassionally                                Counseling given: Not Answered      Vital Signs (Current):   Vitals:    22 0605   Pulse: 78   Resp: 16   Temp: 36.1 °C (97 °F)   TempSrc: Temporal   SpO2: 96%   Weight: 182 lb (82.6 kg)   Height: 5' 4\" (1.626 m)                                              BP Readings from Last 3 Encounters:   22 (!) 170/85   22 (!) 141/72   22 (!) 155/74       NPO Status:  last solid 3/30 1730                         last liquid 3/30 1900                                                    ECG 22:  NSR, 77bpm    QRS 90    Reviewed by Maribel Alexander. Mathis    CTA Neck w/ Contrast 21:  AORTIC ARCH/ARCH VESSELS: No dissection or arterial injury.  No significant   stenosis of the brachiocephalic or subclavian arteries.       CAROTID ARTERIES: Atherosclerotic disease most notable at the bilateral   carotid bulbs.  Suspected severe (greater than 70%) stenosis of the right   carotid bulb.  Evaluation for degree of right carotid bulb stenosis is   limited by streak artifact.  Mild (less than 50%) stenosis of the left   carotid bulb.       VERTEBRAL ARTERIES: No dissection, arterial injury, or significant stenosis.    The right vertebral artery appears to end in PICA.       SOFT TISSUES: The lung apices are clear.  No cervical or superior mediastinal   lymphadenopathy.  The larynx and pharynx are unremarkable.  No acute   abnormality of the salivary and thyroid glands.       BONES: No acute osseous abnormality. CXR 2/16/22: The lungs are without acute focal process.  There is no effusion or   pneumothorax. The cardiomediastinal silhouette is without acute process. The   osseous structures are without acute process. Stress Test 10/10/19: The heart is normal in size. There are relatively mild, fixed distal anteroseptal wall irregularities which likely reflect breast attenuation. These areas demonstrate normal wall motion and thickening on gated imaging. There is otherwise fairly homogenous activity throughout the left ventricle on rest and stress imaging. No suspicious reversible or fixed defects are seen to suggest ischemia or scarring. BMI:   Wt Readings from Last 3 Encounters:   03/31/22 182 lb (82.6 kg)   03/28/22 185 lb (83.9 kg)   03/08/22 180 lb (81.6 kg)     Body mass index is 31.24 kg/m². CBC:   Lab Results   Component Value Date    WBC 7.7 03/28/2022    RBC 4.72 03/28/2022    HGB 14.0 03/28/2022    HCT 42.0 03/28/2022    MCV 89.0 03/28/2022    RDW 11.9 03/28/2022     03/28/2022       CMP:   Lab Results   Component Value Date     03/28/2022    K 4.3 03/28/2022     03/28/2022    CO2 29 03/28/2022    BUN 13 03/28/2022    CREATININE 0.4 03/28/2022    GFRAA >60 03/28/2022    LABGLOM >60 03/28/2022    GLUCOSE 155 03/28/2022    CALCIUM 9.2 03/28/2022    AST 23 11/13/2019    ALT 29 11/13/2019       POC Tests: No results for input(s): POCGLU, POCNA, POCK, POCCL, POCBUN, POCHEMO, POCHCT in the last 72 hours.     Coags:   Lab Results   Component Value Date    PROTIME 11.2 03/28/2022    INR 1.0 03/28/2022       HCG (If Applicable): No results found for: PREGTESTUR, PREGSERUM, HCG, HCGQUANT     ABGs: No results found for: PHART, PO2ART, RFC0ITP, WON9HCY, BEART, X5YUJZVF     Type & Screen (If Applicable):  No results found for: LABABO, LABRH    Drug/Infectious Status (If Applicable):  No results found for: HIV, HEPCAB    COVID-19 Screening (If Applicable):   Lab Results   Component Value Date    COVID19 Not Detected 02/14/2022           Anesthesia Evaluation  Patient summary reviewed and Nursing notes reviewed no history of anesthetic complications:   Airway: Mallampati: I  TM distance: >3 FB   Neck ROM: full  Mouth opening: > = 3 FB Dental:      Comment: Patient denies any missing, chipped, loose, or cracked teeth. Pulmonary: breath sounds clear to auscultation  (+) COPD: moderate,  shortness of breath: chronic,      (-) not a current smoker                           Cardiovascular:  Exercise tolerance: poor (<4 METS),   (+) hypertension: severe, hyperlipidemia    (-) valvular problems/murmurs, past MI and  angina    ECG reviewed  Rhythm: regular  Rate: normal    Stress test reviewed       Beta Blocker:  Not on Beta Blocker      ROS comment: Occasional palpitations     Neuro/Psych:      (-) TIA and CVA            ROS comment: Lumbar disc surgery ~15 years ago GI/Hepatic/Renal:   (+) GERD: well controlled,      (-) liver disease and no renal disease       Endo/Other:        (-) diabetes mellitus, hypothyroidism, blood dyscrasia               Abdominal:             Vascular:   + PVD, aortic or cerebral, .  - DVT and PE. Other Findings: Bilateral pupils PERRLA. Equal dorsi/plantar flexion 5/5 strength. Equal push/pull BUE strength 5/5          Anesthesia Plan      general     ASA 3       Induction: intravenous. arterial line and BIS  MIPS: Prophylactic antiemetics administered. Anesthetic plan and risks discussed with patient. Use of blood products discussed with patient whom consented to blood products. Plan discussed with CRNA and attending.                   Zulma Echavarria RN   3/31/2022

## 2022-03-31 NOTE — PROGRESS NOTES
Patient admitted to CVICU post R carotid endarterectomy. Patient arrived on a simple mask and placed on 3L NC sating 97%. BP elevated, started on cardene drip. Otherwise hemodynamically stable. Alert and oriented x4, following all commands, and moving all extremities. R neck incision is well approximated, no hematoma or bleeding present. Will continue to monitor.

## 2022-03-31 NOTE — H&P
Vascular Surgery History & Physical Exam      Chief Complaint: ACAS    HISTORY OF PRESENT ILLNESS:                The patient is a 59 y.o. female who presents to the hospital for elective right carotid endarterectomy. She denies any problems since the last office visit. IMPRESSION:   Active Hospital Problems    Diagnosis     Carotid stenosis, right [I65.21]     Carotid stenosis, asymptomatic, right [I65.21]        PLAN:  Right carotid endarterectomy. I reviewed the procedure with the patient. I discussed the risks, benefits, and alternatives of the procedure. The patient understands and consents. All questions were answered.         Past Medical History:   Diagnosis Date    Allergic rhinitis     Carotid artery stenosis     Hypertension     Vitamin B12 deficiency         Past Surgical History:   Procedure Laterality Date    LUMBAR DISC SURGERY      MOUTH SURGERY      US LYMPH NODE BIOPSY  5/8/2018    US LYMPH NODE BIOPSY       Current Medications:     Current Facility-Administered Medications:     sodium chloride flush 0.9 % injection 10 mL, 10 mL, IntraVENous, 2 times per day, SALMA Barros CNP    sodium chloride flush 0.9 % injection 10 mL, 10 mL, IntraVENous, PRN, SALMA Barros CNP    0.9 % sodium chloride infusion, 25 mL, IntraVENous, PRKentrell DORSEY APRN - CNP    ceFAZolin (ANCEF) 2000 mg in sterile water 20 mL IV syringe, 2,000 mg, IntraVENous, On Call to OR, SALMA Barros CNP    bupivacaine (PF) (MARCAINE) 0.25 % injection, , , PRWILLIAN, Katherine Pimentel MD, 10 mL at 03/31/22 5147    gelatin adsorbable (GELFOAM) sponge, , , PRKatherine DORSEY MD, 1 each at 03/31/22 3957    sodium chloride 0.9 % 500 mL with heparin (porcine) 5,000 Units, , , PRKatherine DORSEY MD, 500 mL at 03/31/22 1268    thrombin kit, , , Katherine MONTES MD, 10,000 Units at 03/31/22 0653    lidocaine 1 % injection, , , Katherine MONTES MD, 2 mL at 03/31/22 8784    Allergies:  Patient has no known allergies. Social History     Socioeconomic History    Marital status:      Spouse name: Not on file    Number of children: Not on file    Years of education: Not on file    Highest education level: Not on file   Occupational History    Not on file   Tobacco Use    Smoking status: Former Smoker     Packs/day: 1.00     Years: 25.00     Pack years: 25.00     Types: Cigarettes     Start date: 63     Quit date: 2000     Years since quittin.2    Smokeless tobacco: Never Used   Vaping Use    Vaping Use: Never used   Substance and Sexual Activity    Alcohol use: Yes     Comment: occassionally    Drug use: Not Currently    Sexual activity: Not on file   Other Topics Concern    Not on file   Social History Narrative    Not on file     Social Determinants of Health     Financial Resource Strain:     Difficulty of Paying Living Expenses: Not on file   Food Insecurity:     Worried About [address] in the Last Year: Not on file    [address] in the Last Year: Not on file   Transportation Needs:     Lack of Transportation (Medical): Not on file    Lack of Transportation (Non-Medical):  Not on file   Physical Activity:     Days of Exercise per Week: Not on file    Minutes of Exercise per Session: Not on file   Stress:     Feeling of Stress : Not on file   Social Connections:     Frequency of Communication with Friends and Family: Not on file    Frequency of Social Gatherings with Friends and Family: Not on file    Attends Yarsani Services: Not on file    Active Member of Clubs or Organizations: Not on file    Attends Club or Organization Meetings: Not on file    Marital Status: Not on file   Intimate Partner Violence:     Fear of Current or Ex-Partner: Not on file    Emotionally Abused: Not on file    Physically Abused: Not on file    Sexually Abused: Not on file   Housing Stability:     Unable to Pay for Housing in the Last Year: Not on file    Number of Places Lived in the Last Year: Not on file    Unstable Housing in the Last Year: Not on file        Family History   Problem Relation Age of Onset    Prostate Cancer Mother     High Blood Pressure Mother     Diabetes Father     Prostate Cancer Father     High Blood Pressure Father     High Blood Pressure Sister     Stroke Neg Hx     Heart Attack Neg Hx     Elevated Lipids Neg Hx     Coronary Art Dis Neg Hx     Breast Cancer Neg Hx     Heart Failure Neg Hx        REVIEW OF SYSTEMS:  The chart was reviewed.     PHYSICAL EXAM:    Vitals:    03/31/22 0615   BP: (!) 196/92   Pulse:    Resp:    Temp:    SpO2:      CONSTITUTIONAL:  awake, alert, cooperative, no apparent distress, and appears stated age  NECK:  supple, symmetrical, trachea midline  LUNGS:  no increased work of breathing, good air exchange and clear to auscultation  CARDIOVASCULAR:  regular rate and rhythm  ABDOMEN:  soft, non-distended and non-tender    LABS:    Lab Results   Component Value Date    WBC 7.7 03/28/2022    HGB 14.0 03/28/2022    HCT 42.0 03/28/2022     03/28/2022    PROTIME 11.2 03/28/2022    INR 1.0 03/28/2022    K 4.3 03/28/2022    BUN 13 03/28/2022    CREATININE 0.4 (L) 03/28/2022       RADIOLOGY:

## 2022-03-31 NOTE — ANESTHESIA POSTPROCEDURE EVALUATION
Department of Anesthesiology  Postprocedure Note    Patient: Man Palacios  MRN: [id number]  YOB: 1960  Date of evaluation: 3/31/2022  Time:  11:53 AM     Procedure Summary     Date: 03/31/22 Room / Location: [place] [address] / [facility]    Anesthesia Start: 7770 Anesthesia Stop: 4104    Procedure: RIGHT CAROTID ENDARTERECTOMY (Right Neck) Diagnosis: (RIGHT CAROTID STENOSIS)    Surgeons: Tha Oquendo MD Responsible Provider: Idania Friedman MD    Anesthesia Type: general ASA Status: 3          Anesthesia Type: general    Cat Phase I: Cat Score: 10    Cat Phase II:      Last vitals: Reviewed and per EMR flowsheets.        Anesthesia Post Evaluation    Patient location during evaluation: ICU  Patient participation: complete - patient participated  Level of consciousness: awake  Pain score: 3  Airway patency: patent  Nausea & Vomiting: no nausea  Complications: no  Cardiovascular status: hypertensive  Respiratory status: acceptable  Hydration status: euvolemic

## 2022-03-31 NOTE — PROGRESS NOTES
CVICU Admission Note    Name: Risa Xavier  MRN: [id number]    CC: Postoperative Critical Care Management     Indication for Surgery/Procedure: Asymptomatic Carotic Artery Stenosis     Important/Relevant PMH/PSH: PVD, HTN, HPL, COPD, GERD, former smoker     Procedure/Surgeries: 3/31/2022 Right Carotid Endarterectomy       Physical Exam:    BP (!) 172/79   Pulse 97   Temp 97.5 °F (36.4 °C) (Axillary)   Resp 16   Ht 5' 4\" (1.626 m)   Wt 182 lb (82.6 kg)   SpO2 97%   BMI 31.24 kg/m²       General Appearance: Awake, alert. C/o some incisional pain. On Nicardipine gtt for HTN. Eyes: PERRL  Pulmonary: Diminished bibasilar. No wheezes, no accessory muscle use noted   Cardiovascular: RRR, no heaves or thrills palpated   Telemetry: SR  Abdomen: Soft, nontender  Extremities: Palpable pulses all extremities, no edema   Neurologic/Psych: A&Ox3, MAEX4 to command with equal strength.  Tongue midline   Skin: Warm and dry    Incision: Right neck incision well approximated, no swelling noted         Assessment/Plan: Day of Surgery     Carotid artery stenosis S/p Right CEA  - Frequent neurovascular checks, monitor incision for signs of swelling/hematoma   - NPO for now, IVF @125cc/hr  - Calvo catheter to GD  - Bedrest  - Ancef  - PRN morphine/oxycodone for pain management  - HTN-- on Nicardipine gtt to maintain SBP      Electronically signed by SALMA Lee - CNP on 3/31/2022 at 10:05 AM

## 2022-03-31 NOTE — ANESTHESIA PROCEDURE NOTES
Arterial Line:    An arterial line was placed using ultrasound guidance and surface landmarks, in the holding area for the following indication(s): continuous blood pressure monitoring and blood sampling needed. A 20 gauge (size), 1 and 3/8 inch (length), Arrow (type) catheter was placed, Seldinger technique not used, into the left radial artery, secured by tape and Tegaderm. Anesthesia type: Local  Local infiltration: Injection    Events:  patient tolerated procedure well with no complications. 3/31/2022 7:25 AM  Resident/CRNA: SALMA Dennison CRNA  Performed: Resident/CRNA   Preanesthetic Checklist  Completed: patient identified, IV checked, site marked, risks and benefits discussed, surgical consent, monitors and equipment checked, pre-op evaluation, timeout performed, anesthesia consent given, oxygen available and patient being monitored

## 2022-04-01 VITALS
BODY MASS INDEX: 31.07 KG/M2 | WEIGHT: 182 LBS | HEIGHT: 64 IN | SYSTOLIC BLOOD PRESSURE: 148 MMHG | RESPIRATION RATE: 26 BRPM | DIASTOLIC BLOOD PRESSURE: 88 MMHG | OXYGEN SATURATION: 95 % | TEMPERATURE: 97.7 F | HEART RATE: 80 BPM

## 2022-04-01 LAB — POC ACT LR: 145 SECONDS

## 2022-04-01 PROCEDURE — 94640 AIRWAY INHALATION TREATMENT: CPT

## 2022-04-01 PROCEDURE — 2580000003 HC RX 258: Performed by: SURGERY

## 2022-04-01 PROCEDURE — 6360000002 HC RX W HCPCS: Performed by: SURGERY

## 2022-04-01 PROCEDURE — 6370000000 HC RX 637 (ALT 250 FOR IP): Performed by: SURGERY

## 2022-04-01 RX ORDER — OXYCODONE HYDROCHLORIDE 5 MG/1
5 TABLET ORAL EVERY 6 HOURS PRN
Qty: 12 TABLET | Refills: 0 | Status: SHIPPED | OUTPATIENT
Start: 2022-04-01 | End: 2022-04-04

## 2022-04-01 RX ADMIN — HYDROCHLOROTHIAZIDE 12.5 MG: 12.5 TABLET ORAL at 08:19

## 2022-04-01 RX ADMIN — ALBUTEROL SULFATE 2.5 MG: 2.5 SOLUTION RESPIRATORY (INHALATION) at 07:58

## 2022-04-01 RX ADMIN — LOSARTAN POTASSIUM 100 MG: 50 TABLET, FILM COATED ORAL at 08:19

## 2022-04-01 RX ADMIN — ASPIRIN 81 MG 81 MG: 81 TABLET ORAL at 08:19

## 2022-04-01 RX ADMIN — OXYCODONE 5 MG: 5 TABLET ORAL at 00:18

## 2022-04-01 RX ADMIN — Medication 10 ML: at 08:51

## 2022-04-01 ASSESSMENT — PAIN SCALES - GENERAL
PAINLEVEL_OUTOF10: 0
PAINLEVEL_OUTOF10: 5
PAINLEVEL_OUTOF10: 5
PAINLEVEL_OUTOF10: 0

## 2022-04-01 ASSESSMENT — PAIN DESCRIPTION - DESCRIPTORS: DESCRIPTORS: ACHING;DISCOMFORT;DULL

## 2022-04-01 ASSESSMENT — PAIN DESCRIPTION - LOCATION: LOCATION: NECK

## 2022-04-01 ASSESSMENT — PAIN DESCRIPTION - PAIN TYPE: TYPE: ACUTE PAIN;SURGICAL PAIN

## 2022-04-01 ASSESSMENT — PAIN DESCRIPTION - FREQUENCY: FREQUENCY: INTERMITTENT

## 2022-04-01 ASSESSMENT — PAIN - FUNCTIONAL ASSESSMENT: PAIN_FUNCTIONAL_ASSESSMENT: ACTIVITIES ARE NOT PREVENTED

## 2022-04-01 ASSESSMENT — PAIN DESCRIPTION - ORIENTATION: ORIENTATION: RIGHT

## 2022-04-01 ASSESSMENT — PAIN DESCRIPTION - PROGRESSION: CLINICAL_PROGRESSION: GRADUALLY WORSENING

## 2022-04-01 ASSESSMENT — PAIN DESCRIPTION - ONSET: ONSET: GRADUAL

## 2022-04-01 NOTE — PROGRESS NOTES
Vascular Surgery Progress Note    CC: f/u R CEA    HISTORY:  The patient is awake, alert, and oriented. Denies symptoms of lateralized weakness, slurred speech, or amaurosis fugax. Denies difficulty swallowing. IMPRESSION:  POD # 1 s/p R CEA    PLAN:  Ambulate. Advance diet. Discharge home. Instructions reviewed. F/u 2 weeks in the office. Patient Active Problem List   Diagnosis Code    Essential hypertension I10    Hyperlipidemia E78.5    Chronic obstructive pulmonary disease ([facility]) J44.9    Carotid stenosis, asymptomatic, bilateral I65.23    Palpitations R00.2    Carotid stenosis, asymptomatic, left I65.22    PVD (peripheral vascular disease) ([facility]) I73.9    Carotid stenosis, asymptomatic, right I65.21    Carotid stenosis, right I65.21       Current Medications:    sodium chloride        sodium chloride flush, sodium chloride, acetaminophen, oxyCODONE    albuterol  2.5 mg Nebulization 4x daily    aspirin  81 mg Oral Daily    rosuvastatin  20 mg Oral Nightly    sodium chloride flush  10 mL IntraVENous 2 times per day    losartan  100 mg Oral Daily    And    hydroCHLOROthiazide  12.5 mg Oral Daily          PHYSICAL EXAM:    Vitals:    04/01/22 0758   BP:    Pulse:    Resp: 14   Temp:    SpO2: 90%     CONSTITUTIONAL:  awake, alert, cooperative, no apparent distress  ENT: Right neck incision well approximated. Ecchymosis, no hematoma. Tongue midline.    LUNGS:  no increased work of breathing, good air exchange  CARDIOVASCULAR:  regular rate and rhythm  ABDOMEN:  soft, non-distended and non-tender  Bilateral radial pulses 2+    LABS:    Lab Results   Component Value Date    WBC 7.7 03/28/2022    HGB 14.0 03/28/2022    HCT 42.0 03/28/2022     03/28/2022    PROTIME 11.2 03/28/2022    INR 1.0 03/28/2022    K 4.3 03/28/2022    BUN 13 03/28/2022    CREATININE 0.4 (L) 03/28/2022       RADIOLOGY:

## 2022-04-01 NOTE — PLAN OF CARE
Problem: Pain:  Goal: Pain level will decrease  Description: Pain level will decrease  4/1/2022 0846 by Reji Grier RN  Outcome: Met This Shift  4/1/2022 0304 by Simona Araujo RN  Outcome: Met This Shift  Goal: Control of acute pain  Description: Control of acute pain  4/1/2022 0846 by Reji Grier RN  Outcome: Met This Shift  4/1/2022 0304 by Simona Araujo RN  Outcome: Met This Shift  Goal: Control of chronic pain  Description: Control of chronic pain  4/1/2022 0846 by Reji Grier RN  Outcome: Met This Shift  4/1/2022 0304 by Simona Araujo RN  Outcome: Met This Shift

## 2022-04-01 NOTE — PROGRESS NOTES
CVICU Progress Note    Name: Toshia Willson  MRN: [id number]    CC: Postoperative Critical Care Management     Indication for Surgery/Procedure: Asymptomatic Carotic Artery Stenosis      Important/Relevant PMH/PSH: PVD, HTN, HPL, COPD, GERD, former smoker      Procedure/Surgeries: 3/31/2022 Right Carotid Endarterectomy        Intake/Output Summary (Last 24 hours) at 4/1/2022 0753  Last data filed at 4/1/2022 0700  Gross per 24 hour   Intake 3243.81 ml   Output 2570 ml   Net 673.81 ml       No results for input(s): WBC, HGB, HCT, PLT in the last 72 hours. Lab Results   Component Value Date     03/28/2022    K 4.3 03/28/2022     03/28/2022    CO2 29 03/28/2022    BUN 13 03/28/2022    CREATININE 0.4 03/28/2022    GLUCOSE 155 03/28/2022    CALCIUM 9.2 03/28/2022         Physical Exam:    /71   Pulse 87   Temp 98 °F (36.7 °C) (Oral)   Resp 21   Ht 5' 4\" (1.626 m)   Wt 182 lb (82.6 kg)   SpO2 96%   BMI 31.24 kg/m²     General: Awake, alert.  No complaints   Eyes: PERRL, anicteric   Pulmonary:No wheezes, no accessory muscle use noted on room air   Cardiovascular:  RRR, no heaves or thrills on palpation  Tele: SR  Abdomen: Soft, nontender, + BS   Neurologic/Psych: A&Ox3, WHITEHEAD to command, equal in strength bilaterally   Skin: Warm and dry  Incisions: right neck incision soft well approximated       Assessment/Plan: POD #1  Carotid artery stenosis S/p Right CEA  - s/e no acute events overnight  - Remove art line, regalado   - tolerating diet   - ambulate     Dispo: Discharge home today, f/u with Dr Demetris Mcgowan in 2 weeks     Electronically signed by SALMA Amado CNP on 4/1/2022 at 7:53 AM

## 2022-04-01 NOTE — PLAN OF CARE
Problem: Pain:  Goal: Pain level will decrease  Description: Pain level will decrease  4/1/2022 1048 by Michelle Virgen RN  Outcome: Completed  4/1/2022 0846 by Michelle Virgen RN  Outcome: Met This Shift  4/1/2022 0304 by Amalia Luong RN  Outcome: Met This Shift  Goal: Control of acute pain  Description: Control of acute pain  4/1/2022 1048 by Michelle Virgen RN  Outcome: Completed  4/1/2022 0846 by Michelle Virgen RN  Outcome: Met This Shift  4/1/2022 0304 by Amalia Luong RN  Outcome: Met This Shift  Goal: Control of chronic pain  Description: Control of chronic pain  4/1/2022 1048 by Michelle Virgen RN  Outcome: Completed  4/1/2022 0846 by Michelle Virgen RN  Outcome: Met This Shift  4/1/2022 0304 by Amalia Luong RN  Outcome: Met This Shift

## 2022-04-01 NOTE — HOME CARE
[facility] [facility] referral received. Awaiting return call from patient to verify demos. Plan to see after discharge. Vladislav Montoya, AISHA [address] [facility].

## 2022-04-01 NOTE — PLAN OF CARE
Problem: Pain:  Goal: Pain level will decrease  Description: Pain level will decrease  Outcome: Met This Shift  Goal: Control of acute pain  Description: Control of acute pain  Outcome: Met This Shift  Goal: Control of chronic pain  Description: Control of chronic pain  Outcome: Met This Shift     Problem: Infection - Surgical Site:  Goal: Will show no infection signs and symptoms  Description: Will show no infection signs and symptoms  Outcome: Met This Shift     Problem: Skin Integrity:  Goal: Will show no infection signs and symptoms  Description: Will show no infection signs and symptoms  Outcome: Met This Shift  Goal: Absence of new skin breakdown  Description: Absence of new skin breakdown  Outcome: Met This Shift

## 2022-04-04 ENCOUNTER — OFFICE VISIT (OUTPATIENT)
Dept: ENT CLINIC | Age: 62
End: 2022-04-04
Payer: COMMERCIAL

## 2022-04-04 VITALS — WEIGHT: 182 LBS | BODY MASS INDEX: 31.07 KG/M2 | HEIGHT: 64 IN

## 2022-04-04 DIAGNOSIS — R09.81 CHRONIC NASAL CONGESTION: ICD-10-CM

## 2022-04-04 DIAGNOSIS — K21.9 LPRD (LARYNGOPHARYNGEAL REFLUX DISEASE): ICD-10-CM

## 2022-04-04 DIAGNOSIS — R09.82 PND (POST-NASAL DRIP): Primary | ICD-10-CM

## 2022-04-04 PROCEDURE — 99213 OFFICE O/P EST LOW 20 MIN: CPT | Performed by: OTOLARYNGOLOGY

## 2022-04-04 ASSESSMENT — ENCOUNTER SYMPTOMS
SINUS PAIN: 1
COUGH: 0
SHORTNESS OF BREATH: 0
RHINORRHEA: 1
VOMITING: 0

## 2022-04-04 NOTE — PROGRESS NOTES
[facility] Otolaryngology  Dr. Jan Meraz. Myranda De Souza. Ms.Ed        Patient Name:  Andrez Hayes  :  1960     CHIEF C/O:    Chief Complaint   Patient presents with    Follow-up     3 mo LPR,        HISTORY OBTAINED FROM:  patient    HISTORY OF PRESENT ILLNESS:       Amber Webb is a 59y.o. year old female, here today for follow up known history of globus sensation associated LPR on PPI therapy greater than pulm status stable to wean to as needed PPI therapy. Patient does not complain of new chronic allergic rhinitis congestion with associated postnasal drainage. No current cosmetic fever chills nausea vomiting chest pain. No complaints of new concerns including epistaxis, no history of recurrent sinusitis. Past Medical History:   Diagnosis Date    Allergic rhinitis     Carotid artery stenosis     Hypertension     Vitamin B12 deficiency      Past Surgical History:   Procedure Laterality Date    CAROTID ENDARTERECTOMY Right 3/31/2022    RIGHT CAROTID ENDARTERECTOMY performed by Ines Lowery MD at  [facility] BIOPSY  2018     LYMPH NODE BIOPSY       Current Outpatient Medications:     rosuvastatin (CRESTOR) 20 MG tablet, Take 1 tablet by mouth daily, Disp: 90 tablet, Rfl: 1    losartan-hydroCHLOROthiazide (HYZAAR) 100-12.5 MG per tablet, TAKE 1 TABLET DAILY, Disp: 90 tablet, Rfl: 1    albuterol sulfate  (90 Base) MCG/ACT inhaler, inhale 2 puffs by mouth INTO THE LUNGS SIX TIMES PER DAY if needed, Disp: 1 Inhaler, Rfl: 2    Multiple Vitamin (MULTI VITAMIN DAILY PO), Take 1 tablet by mouth daily , Disp: , Rfl:     aspirin 81 MG tablet, Take 81 mg by mouth daily, Disp: , Rfl:     oxyCODONE (ROXICODONE) 5 MG immediate release tablet, Take 1 tablet by mouth every 6 hours as needed for Pain for up to 3 days. (Patient not taking: Reported on 2022), Disp: 12 tablet, Rfl: 0  Patient has no known allergies.   Social History Tobacco Use    Smoking status: Former Smoker     Packs/day: 1.00     Years: 25.00     Pack years: 25.00     Types: Cigarettes     Start date: 63     Quit date:      Years since quittin.2    Smokeless tobacco: Never Used   Vaping Use    Vaping Use: Never used   Substance Use Topics    Alcohol use: Yes     Comment: occassionally    Drug use: Not Currently     Family History   Problem Relation Age of Onset    Prostate Cancer Mother     High Blood Pressure Mother     Diabetes Father     Prostate Cancer Father     High Blood Pressure Father     High Blood Pressure Sister     Stroke Neg Hx     Heart Attack Neg Hx     Elevated Lipids Neg Hx     Coronary Art Dis Neg Hx     Breast Cancer Neg Hx     Heart Failure Neg Hx        Review of Systems   Constitutional: Negative for chills and fever. HENT: Positive for congestion, rhinorrhea and sinus pain. Negative for ear discharge and hearing loss. Respiratory: Negative for cough and shortness of breath. Cardiovascular: Negative for chest pain and palpitations. Gastrointestinal: Negative for vomiting. Skin: Negative for rash. Allergic/Immunologic: Negative for environmental allergies. Neurological: Negative for dizziness and headaches. Hematological: Does not bruise/bleed easily. All other systems reviewed and are negative. Ht 5' 4\" (1.626 m)   Wt 182 lb (82.6 kg)   BMI 31.24 kg/m²   Physical Exam  Vitals and nursing note reviewed. Constitutional:       Appearance: She is well-developed. HENT:      Head: Normocephalic and atraumatic. Right Ear: Tympanic membrane and ear canal normal.      Left Ear: Tympanic membrane and ear canal normal.      Nose: No nasal deformity. Right Turbinates: Not enlarged or swollen. Left Turbinates: Not enlarged or swollen. Mouth/Throat:      Lips: No lesions. Tongue: No lesions. Tongue does not deviate from midline. Palate: No mass and lesions.       Tonsils: No tonsillar exudate or tonsillar abscesses. Comments: Right marginal nerve weak from carotid artery surgery   Eyes:      Pupils: Pupils are equal, round, and reactive to light. Neck:      Thyroid: No thyromegaly. Trachea: No tracheal deviation. Cardiovascular:      Rate and Rhythm: Normal rate. Pulmonary:      Effort: Pulmonary effort is normal. No respiratory distress. Musculoskeletal:         General: Normal range of motion. Cervical back: Normal range of motion. Lymphadenopathy:      Cervical: No cervical adenopathy. Skin:     General: Skin is warm. Findings: No erythema. Neurological:      Mental Status: She is alert. Cranial Nerves: No cranial nerve deficit. IMPRESSION/PLAN:  History of intermittent episodes of hoarseness, with associated globus sensation with improved medical therapy with PPI. Patient also is complaining of chronic clear rhinorrhea with intermit episodes of recurrent on a more consistent basis. Evidence of intranasal congestion today with clear rhinorrhea, patient was started on Astelin daily use PPI as needed, follow-up 6 months. Dr. Ernestine Martínez.  Otolaryngology Facial Plastic Surgery  :  [facility] Otolaryngology/Facial Plastic Surgery Residency  Associate Clinical Professor:  Mac Juarez, [facility]

## 2022-04-06 ENCOUNTER — TELEPHONE (OUTPATIENT)
Dept: VASCULAR SURGERY | Age: 62
End: 2022-04-06

## 2022-04-18 ENCOUNTER — TELEPHONE (OUTPATIENT)
Dept: VASCULAR SURGERY | Age: 62
End: 2022-04-18

## 2022-04-19 ENCOUNTER — OFFICE VISIT (OUTPATIENT)
Dept: VASCULAR SURGERY | Age: 62
End: 2022-04-19

## 2022-04-19 DIAGNOSIS — I65.23 BILATERAL CAROTID ARTERY STENOSIS: Primary | ICD-10-CM

## 2022-04-19 PROCEDURE — 99024 POSTOP FOLLOW-UP VISIT: CPT | Performed by: SURGERY

## 2022-04-19 NOTE — PROGRESS NOTES
4/19/2022    [facility] Cool  1960    Chief Complaint   Patient presents with    Post-Op Check     s/p (R) CEA       Patient returns for post operative evaluation status post right carotid endarterectomy. The patient denies any unexpected problems since hospital discharge, except slight right lip droop. Procedure Laterality Date    CAROTID ENDARTERECTOMY Right 3/31/2022    RIGHT CAROTID ENDARTERECTOMY performed by Ruth José MD at [address] [facility] BI[facility]  5/8/2018    US LYMPH NODE BIOPSY       Physical Exam:  The neck incision is healing without evidence of infection. Heart rhythm is regular. Radial pulse are appreciated bilaterally. Assessment:  Post-operative carotid endarterectomy. Problem List Items Addressed This Visit     None      Visit Diagnoses     Bilateral carotid artery stenosis    -  Primary          I reviewed with the patient that normal activities can be resumed as tolerated. I reviewed with her that the droop she is experiencing should resolve over the next few months. Plan: Return in 6 months for follow-up carotid ultrasound.

## 2022-06-13 ENCOUNTER — OFFICE VISIT (OUTPATIENT)
Dept: PRIMARY CARE CLINIC | Age: 62
End: 2022-06-13
Payer: COMMERCIAL

## 2022-06-13 VITALS
SYSTOLIC BLOOD PRESSURE: 138 MMHG | TEMPERATURE: 97.8 F | BODY MASS INDEX: 31.79 KG/M2 | HEIGHT: 64 IN | DIASTOLIC BLOOD PRESSURE: 72 MMHG | WEIGHT: 186.2 LBS | HEART RATE: 87 BPM | OXYGEN SATURATION: 97 %

## 2022-06-13 DIAGNOSIS — R10.11 RUQ ABDOMINAL PAIN: Primary | ICD-10-CM

## 2022-06-13 PROCEDURE — 99214 OFFICE O/P EST MOD 30 MIN: CPT | Performed by: NURSE PRACTITIONER

## 2022-06-13 SDOH — ECONOMIC STABILITY: FOOD INSECURITY: WITHIN THE PAST 12 MONTHS, THE FOOD YOU BOUGHT JUST DIDN'T LAST AND YOU DIDN'T HAVE MONEY TO GET MORE.: NEVER TRUE

## 2022-06-13 SDOH — ECONOMIC STABILITY: FOOD INSECURITY: WITHIN THE PAST 12 MONTHS, YOU WORRIED THAT YOUR FOOD WOULD RUN OUT BEFORE YOU GOT MONEY TO BUY MORE.: NEVER TRUE

## 2022-06-13 ASSESSMENT — SOCIAL DETERMINANTS OF HEALTH (SDOH): HOW HARD IS IT FOR YOU TO PAY FOR THE VERY BASICS LIKE FOOD, HOUSING, MEDICAL CARE, AND HEATING?: NOT HARD AT ALL

## 2022-06-13 ASSESSMENT — PATIENT HEALTH QUESTIONNAIRE - PHQ9
1. LITTLE INTEREST OR PLEASURE IN DOING THINGS: 0
SUM OF ALL RESPONSES TO PHQ QUESTIONS 1-9: 0
SUM OF ALL RESPONSES TO PHQ9 QUESTIONS 1 & 2: 0
2. FEELING DOWN, DEPRESSED OR HOPELESS: 0
SUM OF ALL RESPONSES TO PHQ QUESTIONS 1-9: 0

## 2022-06-14 DIAGNOSIS — R10.11 RUQ ABDOMINAL PAIN: ICD-10-CM

## 2022-06-14 LAB
ALBUMIN SERPL-MCNC: NORMAL G/DL
ALP BLD-CCNC: NORMAL U/L
ALT SERPL-CCNC: NORMAL U/L
ANION GAP SERPL CALCULATED.3IONS-SCNC: NORMAL MMOL/L
AST SERPL-CCNC: NORMAL U/L
BASOPHILS ABSOLUTE: NORMAL
BASOPHILS RELATIVE PERCENT: NORMAL
BILIRUB SERPL-MCNC: NORMAL MG/DL
BUN BLDV-MCNC: NORMAL MG/DL
CALCIUM SERPL-MCNC: NORMAL MG/DL
CHLORIDE BLD-SCNC: NORMAL MMOL/L
CO2: NORMAL
CREAT SERPL-MCNC: NORMAL MG/DL
EOSINOPHILS ABSOLUTE: NORMAL
EOSINOPHILS RELATIVE PERCENT: NORMAL
GFR CALCULATED: NORMAL
GLUCOSE BLD-MCNC: NORMAL MG/DL
HCT VFR BLD CALC: NORMAL %
HEMOGLOBIN: NORMAL
LYMPHOCYTES ABSOLUTE: NORMAL
LYMPHOCYTES RELATIVE PERCENT: NORMAL
MCH RBC QN AUTO: NORMAL PG
MCHC RBC AUTO-ENTMCNC: NORMAL G/DL
MCV RBC AUTO: NORMAL FL
MONOCYTES ABSOLUTE: NORMAL
MONOCYTES RELATIVE PERCENT: NORMAL
NEUTROPHILS ABSOLUTE: NORMAL
NEUTROPHILS RELATIVE PERCENT: NORMAL
PDW BLD-RTO: NORMAL %
PLATELET # BLD: NORMAL 10*3/UL
PMV BLD AUTO: NORMAL FL
POTASSIUM SERPL-SCNC: NORMAL MMOL/L
RBC # BLD: NORMAL 10*6/UL
SODIUM BLD-SCNC: NORMAL MMOL/L
TOTAL PROTEIN: NORMAL
WBC # BLD: NORMAL 10*3/UL

## 2022-06-26 DIAGNOSIS — I10 ESSENTIAL HYPERTENSION: ICD-10-CM

## 2022-06-27 RX ORDER — LOSARTAN POTASSIUM AND HYDROCHLOROTHIAZIDE 12.5; 1 MG/1; MG/1
TABLET ORAL
Qty: 90 TABLET | Refills: 1 | Status: SHIPPED | OUTPATIENT
Start: 2022-06-27

## 2022-07-01 ENCOUNTER — OFFICE VISIT (OUTPATIENT)
Dept: PRIMARY CARE CLINIC | Age: 62
End: 2022-07-01
Payer: COMMERCIAL

## 2022-07-01 VITALS
OXYGEN SATURATION: 96 % | HEART RATE: 75 BPM | TEMPERATURE: 97.7 F | SYSTOLIC BLOOD PRESSURE: 138 MMHG | DIASTOLIC BLOOD PRESSURE: 74 MMHG | BODY MASS INDEX: 31.76 KG/M2 | WEIGHT: 185 LBS

## 2022-07-01 DIAGNOSIS — R10.11 RUQ ABDOMINAL PAIN: ICD-10-CM

## 2022-07-01 DIAGNOSIS — I10 ESSENTIAL HYPERTENSION: Primary | ICD-10-CM

## 2022-07-01 DIAGNOSIS — E78.5 HYPERLIPIDEMIA, UNSPECIFIED HYPERLIPIDEMIA TYPE: ICD-10-CM

## 2022-07-01 DIAGNOSIS — J44.9 CHRONIC OBSTRUCTIVE PULMONARY DISEASE, UNSPECIFIED COPD TYPE (HCC): ICD-10-CM

## 2022-07-01 DIAGNOSIS — R42 LIGHTHEADED: ICD-10-CM

## 2022-07-01 DIAGNOSIS — E78.2 MIXED HYPERLIPIDEMIA: ICD-10-CM

## 2022-07-01 PROCEDURE — 99214 OFFICE O/P EST MOD 30 MIN: CPT | Performed by: NURSE PRACTITIONER

## 2022-07-01 ASSESSMENT — ENCOUNTER SYMPTOMS
EYE DISCHARGE: 0
PHOTOPHOBIA: 0
COLOR CHANGE: 0
EYE ITCHING: 0
DIARRHEA: 0
VOICE CHANGE: 0
WHEEZING: 0
CONSTIPATION: 0
CHEST TIGHTNESS: 0
COUGH: 0
EYE REDNESS: 0
NAUSEA: 0
CHOKING: 0
RHINORRHEA: 0
TROUBLE SWALLOWING: 0
VOMITING: 0
FACIAL SWELLING: 0
ABDOMINAL PAIN: 1
SHORTNESS OF BREATH: 1
BLOOD IN STOOL: 0
SINUS PAIN: 0
BACK PAIN: 0
ABDOMINAL DISTENTION: 0
SINUS PRESSURE: 0
SORE THROAT: 0
EYE PAIN: 0

## 2022-07-01 ASSESSMENT — VISUAL ACUITY: OU: 1

## 2022-07-01 NOTE — PROGRESS NOTES
22  Santosh Shoemaker : 1960 Sex: female  Age: 59 y.o. Chief Complaint   Patient presents with   [facility] New Doctor    Results     review us        Meghan Tian is seen today to establish as new patient with myself. She formally been a patient of Dr. Nallely Buck, but since she is no longer practicing in [facility] has asked me to be her provider. She recently had an ultrasound that showed some fatty infiltration of the liver and that it measured 15 cm. She still reports some intermittent pain to the right upper quadrant. The ultrasound showed no etiology for abnormalities of the gallbladder including no thickened walls or gallstones present. She reports that she is having no acute complaints but reports some shortness of breath with exertion, which is worse when it is humid. She also notes some chronic arthralgias to her knees. She states they worsen when she is going up and down steps, repeatedly. I did review her vital signs, allergies, medications, medical history, social history, surgical history, smoking history, and family history. She denies any acute confusion, forgetfulness, any change in cognition. Review of Systems   Constitutional: Negative for appetite change, chills, diaphoresis, fatigue, fever and unexpected weight change. HENT: Negative for congestion, ear pain, facial swelling, hearing loss, nosebleeds, postnasal drip, rhinorrhea, sinus pressure, sinus pain, sneezing, sore throat, tinnitus, trouble swallowing and voice change. Eyes: Negative for photophobia, pain, discharge, redness and itching. Respiratory: Positive for shortness of breath (When it becomes very humid. Hx of COPD). Negative for cough, choking, chest tightness and wheezing. Cardiovascular: Negative for chest pain, palpitations and leg swelling. Gastrointestinal: Positive for abdominal pain (Occasionally to RUQ).  Negative for abdominal distention, blood in stool, constipation, diarrhea, nausea and vomiting. Endocrine: Negative for cold intolerance, heat intolerance, polydipsia, polyphagia and polyuria. Genitourinary: Negative for difficulty urinating, dysuria, flank pain, frequency, hematuria and urgency. Musculoskeletal: Positive for arthralgias (Knees, when going up and down steps. Comes from overuse). Negative for back pain, gait problem, joint swelling, myalgias, neck pain and neck stiffness. Skin: Negative for color change, rash and wound. Allergic/Immunologic: Negative for environmental allergies and food allergies. Neurological: Positive for light-headedness (When standing up, after bending over). Negative for dizziness, tremors, seizures, syncope, facial asymmetry, speech difficulty, weakness, numbness and headaches. Hematological: Does not bruise/bleed easily. Psychiatric/Behavioral: Negative for agitation, behavioral problems, confusion, decreased concentration, dysphoric mood, hallucinations, self-injury, sleep disturbance and suicidal ideas. The patient is not nervous/anxious.           Current Outpatient Medications:     losartan-hydroCHLOROthiazide (HYZAAR) 100-12.5 MG per tablet, TAKE 1 TABLET DAILY, Disp: 90 tablet, Rfl: 1    rosuvastatin (CRESTOR) 20 MG tablet, Take 1 tablet by mouth daily, Disp: 90 tablet, Rfl: 1    albuterol sulfate  (90 Base) MCG/ACT inhaler, inhale 2 puffs by mouth INTO THE LUNGS SIX TIMES PER DAY if needed, Disp: 1 Inhaler, Rfl: 2    Multiple Vitamin (MULTI VITAMIN DAILY PO), Take 1 tablet by mouth daily , Disp: , Rfl:     aspirin 81 MG tablet, Take 81 mg by mouth daily, Disp: , Rfl:   No Known Allergies    Past Medical History:   Diagnosis Date    Allergic rhinitis     Carotid artery stenosis     Hypertension     Vitamin B12 deficiency      Past Surgical History:   Procedure Laterality Date    CAROTID ENDARTERECTOMY Right 3/31/2022    RIGHT CAROTID ENDARTERECTOMY performed by Nallely Grimm MD at [place] [address] LUMBAR DISC SURGERY      MOUTH SURGERY      US LYMPH NODE BIOPSY  2018    US LYMPH NODE BIOPSY     Family History   Problem Relation Age of Onset    High Blood Pressure Mother     Diabetes Father     Prostate Cancer Father     High Blood Pressure Father     Diabetes Sister     High Blood Pressure Sister     Stroke Neg Hx     Heart Attack Neg Hx     Elevated Lipids Neg Hx     Coronary Art Dis Neg Hx     Breast Cancer Neg Hx     Heart Failure Neg Hx      Social History     Socioeconomic History    Marital status:      Spouse name: Not on file    Number of children: Not on file    Years of education: Not on file    Highest education level: Not on file   Occupational History    Not on file   Tobacco Use    Smoking status: Former Smoker     Packs/day: 1.00     Years: 25.00     Pack years: 25.00     Types: Cigarettes     Start date: 63     Quit date:      Years since quittin.5    Smokeless tobacco: Never Used   Vaping Use    Vaping Use: Never used   Substance and Sexual Activity    Alcohol use: Yes     Comment: occassionally    Drug use: Not Currently    Sexual activity: Not on file   Other Topics Concern    Not on file   Social History Narrative    Not on file     Social Determinants of Health     Financial Resource Strain: Low Risk     Difficulty of Paying Living Expenses: Not hard at all   Food Insecurity: No Food Insecurity    Worried About Running Out of Food in the Last Year: Never true    Donell of Food in the Last Year: Never true   Transportation Needs:     Lack of Transportation (Medical): Not on file    Lack of Transportation (Non-Medical):  Not on file   Physical Activity:     Days of Exercise per Week: Not on file    Minutes of Exercise per Session: Not on file   Stress:     Feeling of Stress : Not on file   Social Connections:     Frequency of Communication with Friends and Family: Not on file    Frequency of Social Gatherings with Friends and Family: Not on file    Attends Mandaeism Services: Not on file    Active Member of Clubs or Organizations: Not on file    Attends Club or Organization Meetings: Not on file    Marital Status: Not on file   Intimate Partner Violence:     Fear of Current or Ex-Partner: Not on file    Emotionally Abused: Not on file    Physically Abused: Not on file    Sexually Abused: Not on file   Housing Stability:     Unable to Pay for Housing in the Last Year: Not on file    Number of [place] in the Last Year: Not on file    Unstable Housing in the Last Year: Not on file       Vitals:    07/01/22 0957   BP: 138/74   Site: Left Upper Arm   Position: Sitting   Cuff Size: Large Adult   Pulse: 75   Temp: 97.7 °F (36.5 °C)   TempSrc: Temporal   SpO2: 96%   Weight: 185 lb (83.9 kg)       Physical Exam  Vitals and nursing note reviewed. Constitutional:       General: She is awake. She is not in acute distress. Appearance: Normal appearance. She is well-developed. She is obese. She is not ill-appearing, toxic-appearing or diaphoretic. HENT:      Head: Normocephalic and atraumatic. Right Ear: Hearing and external ear normal. There is no impacted cerumen. Left Ear: Hearing and external ear normal. There is no impacted cerumen. Nose: Nose normal. No congestion or rhinorrhea. Mouth/Throat:      Lips: Pink. No lesions. Mouth: Mucous membranes are moist.      Pharynx: Oropharynx is clear. No oropharyngeal exudate or posterior oropharyngeal erythema. Eyes:      General: Lids are normal. Vision grossly intact. Gaze aligned appropriately. No scleral icterus. Right eye: No discharge. Left eye: No discharge. Extraocular Movements: Extraocular movements intact. Conjunctiva/sclera: Conjunctivae normal.      Right eye: Right conjunctiva is not injected. Left eye: Left conjunctiva is not injected. Pupils: Pupils are equal, round, and reactive to light.    Neck: Thyroid: No thyromegaly. Vascular: No carotid bruit. Trachea: Trachea normal.   Cardiovascular:      Rate and Rhythm: Normal rate. Rhythm regularly irregular. Pulses: Normal pulses. Heart sounds: Normal heart sounds, S1 normal and S2 normal. No murmur heard. No friction rub. No gallop. Pulmonary:      Effort: Pulmonary effort is normal. No tachypnea, accessory muscle usage or respiratory distress. Breath sounds: Normal breath sounds and air entry. No stridor. No wheezing, rhonchi or rales. Chest:      Chest wall: Deformity present. No tenderness. Abdominal:      General: Bowel sounds are normal. There is no distension. Palpations: Abdomen is soft. There is no mass. Tenderness: There is no abdominal tenderness. There is no right CVA tenderness, left CVA tenderness, guarding or rebound. Hernia: No hernia is present. Musculoskeletal:         General: No swelling, tenderness, deformity or signs of injury. Normal range of motion. Cervical back: Full passive range of motion without pain, normal range of motion and neck supple. No rigidity. No muscular tenderness. Right lower leg: No edema. Left lower leg: No edema. Lymphadenopathy:      Cervical: No cervical adenopathy. Skin:     General: Skin is warm and dry. Capillary Refill: Capillary refill takes less than 2 seconds. Coloration: Skin is not jaundiced or pale. Findings: No bruising, erythema, lesion or rash. Neurological:      General: No focal deficit present. Mental Status: She is alert and oriented to person, place, and time. Mental status is at baseline. Cranial Nerves: Cranial nerves are intact. No cranial nerve deficit. Sensory: Sensation is intact. No sensory deficit. Motor: Motor function is intact. No weakness. Coordination: Coordination is intact. Coordination normal.      Gait: Gait is intact.  Gait normal.      Deep Tendon Reflexes: Reflexes normal. Psychiatric:         Attention and Perception: Attention and perception normal.         Mood and Affect: Mood and affect normal.         Speech: Speech normal.         Behavior: Behavior normal. Behavior is cooperative. Thought Content: Thought content normal.         Cognition and Memory: Cognition and memory normal.         Judgment: Judgment normal.         Assessment and Plan:  Gabrielle Gudino was seen today for established new doctor and results. Diagnoses and all orders for this visit:    Essential hypertension  -     CBC with Auto Differential; Future  -     Comprehensive Metabolic Panel; Future    RUQ abdominal pain    Chronic obstructive pulmonary disease, unspecified COPD type ([facility] [address]    Hyperlipidemia, unspecified hyperlipidemia type    Mixed hyperlipidemia  -     Lipid Panel; Future    Lightheaded  -     TSH; Future        Discussions/Education provided to patients during visit:  [] Discussed the importance to stop smoking. [x] Advised to monitor eating habits. [] Reviewed and discussed Imaging results. [] Reviewed and discussed Lab results. [x] Discussed the importance of drinking plenty of fluids. [x] Cut down on Salt, Caffeine, and Sugar. [x] Continue Medications as Discussed. [x] Communicated with patient any concerns, to phone office. Return in about 3 months (around 10/1/2022) for Review of chronic conditions, Lab Review. I spent 35 minutes face-to-face with this patient.       Seen By:  SALMA White NP

## 2022-08-25 RX ORDER — ROSUVASTATIN CALCIUM 20 MG/1
20 TABLET, COATED ORAL DAILY
Qty: 90 TABLET | Refills: 1 | Status: SHIPPED | OUTPATIENT
Start: 2022-08-25

## 2022-10-03 ENCOUNTER — OFFICE VISIT (OUTPATIENT)
Dept: ENT CLINIC | Age: 62
End: 2022-10-03
Payer: COMMERCIAL

## 2022-10-03 VITALS
SYSTOLIC BLOOD PRESSURE: 157 MMHG | DIASTOLIC BLOOD PRESSURE: 84 MMHG | WEIGHT: 185 LBS | BODY MASS INDEX: 31.58 KG/M2 | HEIGHT: 64 IN | HEART RATE: 74 BPM

## 2022-10-03 DIAGNOSIS — K21.9 LPRD (LARYNGOPHARYNGEAL REFLUX DISEASE): Primary | ICD-10-CM

## 2022-10-03 DIAGNOSIS — J30.9 ALLERGIC RHINITIS, UNSPECIFIED SEASONALITY, UNSPECIFIED TRIGGER: ICD-10-CM

## 2022-10-03 PROCEDURE — 99213 OFFICE O/P EST LOW 20 MIN: CPT | Performed by: OTOLARYNGOLOGY

## 2022-10-03 ASSESSMENT — ENCOUNTER SYMPTOMS
SHORTNESS OF BREATH: 0
COUGH: 0
VOMITING: 0
RHINORRHEA: 1
SINUS PAIN: 1

## 2022-10-04 ENCOUNTER — OFFICE VISIT (OUTPATIENT)
Dept: PRIMARY CARE CLINIC | Age: 62
End: 2022-10-04
Payer: COMMERCIAL

## 2022-10-04 VITALS
DIASTOLIC BLOOD PRESSURE: 76 MMHG | OXYGEN SATURATION: 95 % | SYSTOLIC BLOOD PRESSURE: 138 MMHG | WEIGHT: 185.4 LBS | TEMPERATURE: 98 F | BODY MASS INDEX: 31.82 KG/M2 | HEART RATE: 80 BPM

## 2022-10-04 DIAGNOSIS — I65.23 CAROTID STENOSIS, ASYMPTOMATIC, BILATERAL: ICD-10-CM

## 2022-10-04 DIAGNOSIS — R73.01 IFG (IMPAIRED FASTING GLUCOSE): ICD-10-CM

## 2022-10-04 DIAGNOSIS — I73.9 PVD (PERIPHERAL VASCULAR DISEASE) (HCC): ICD-10-CM

## 2022-10-04 DIAGNOSIS — E78.2 MIXED HYPERLIPIDEMIA: ICD-10-CM

## 2022-10-04 DIAGNOSIS — R42 LIGHTHEADED: ICD-10-CM

## 2022-10-04 DIAGNOSIS — J44.9 CHRONIC OBSTRUCTIVE PULMONARY DISEASE, UNSPECIFIED COPD TYPE (HCC): ICD-10-CM

## 2022-10-04 DIAGNOSIS — I10 ESSENTIAL HYPERTENSION: Primary | ICD-10-CM

## 2022-10-04 PROCEDURE — 99214 OFFICE O/P EST MOD 30 MIN: CPT | Performed by: NURSE PRACTITIONER

## 2022-10-04 ASSESSMENT — PATIENT HEALTH QUESTIONNAIRE - PHQ9
2. FEELING DOWN, DEPRESSED OR HOPELESS: 0
SUM OF ALL RESPONSES TO PHQ QUESTIONS 1-9: 0
1. LITTLE INTEREST OR PLEASURE IN DOING THINGS: 0
SUM OF ALL RESPONSES TO PHQ9 QUESTIONS 1 & 2: 0
SUM OF ALL RESPONSES TO PHQ QUESTIONS 1-9: 0

## 2022-10-04 ASSESSMENT — ENCOUNTER SYMPTOMS
COUGH: 0
ABDOMINAL PAIN: 0
TROUBLE SWALLOWING: 0
BLOOD IN STOOL: 0
EYE DISCHARGE: 0
SORE THROAT: 0
EYE REDNESS: 0
NAUSEA: 1
PHOTOPHOBIA: 0
COLOR CHANGE: 0
SINUS PRESSURE: 0
VOICE CHANGE: 0
SHORTNESS OF BREATH: 0
FACIAL SWELLING: 0
CHEST TIGHTNESS: 0
EYE ITCHING: 0
EYE PAIN: 0
BACK PAIN: 1
RHINORRHEA: 0
DIARRHEA: 0
SINUS PAIN: 0
CHOKING: 0
WHEEZING: 0
ABDOMINAL DISTENTION: 0
CONSTIPATION: 0
VOMITING: 0

## 2022-10-04 NOTE — PROGRESS NOTES
10/4/22  Hank Payton : 1960 Sex: female  Age: 59 y.o. Chief Complaint   Patient presents with    [address] [place] [address] [id number] is seen today for 3-month follow-up and review of her overall condition plan of care. She is also here to discuss labs, that she has had recently drawn. She reports that she is doing fairly well today, but states that she does experience some right upper quadrant abdominal pain and nausea, when overeating. We did an ultrasound of the gallbladder which was negative, although it did show some fatty infiltration of the liver. She continues to report chronic arthralgias to bilateral knees, but states that they are no worse than normal.  States that she has chronic back pain but worsens when she is bending over and over exerting herself. Lastly, she reports some intermittent lightheadedness. She reports that this can happen when she is sitting and when she is changing positions. She is accompanied by her . She denies any acute confusion, forgetfulness, any change in cognition. Review of Systems   Constitutional:  Negative for appetite change, chills, diaphoresis, fatigue, fever and unexpected weight change. HENT:  Positive for congestion (From allergies). Negative for ear pain, facial swelling, hearing loss, nosebleeds, postnasal drip, rhinorrhea, sinus pressure, sinus pain, sneezing, sore throat, tinnitus, trouble swallowing and voice change. Eyes:  Negative for photophobia, pain, discharge, redness and itching. Respiratory:  Negative for cough, choking, chest tightness, shortness of breath and wheezing. Cardiovascular:  Negative for chest pain, palpitations and leg swelling. Gastrointestinal:  Positive for nausea (Only when over-eating). Negative for abdominal distention, abdominal pain, blood in stool, constipation, diarrhea and vomiting.    Endocrine: Negative for cold intolerance, heat intolerance, polydipsia, polyphagia and polyuria. Genitourinary:  Negative for difficulty urinating, dysuria, flank pain, frequency, hematuria and urgency. Musculoskeletal:  Positive for arthralgias (Bilateral knees, no worse than normal), back pain (Chronic issue, worsens with bending over and overexerting herself) and gait problem. Negative for joint swelling, myalgias, neck pain and neck stiffness. Skin:  Negative for color change, rash and wound. Allergic/Immunologic: Negative for environmental allergies and food allergies. Neurological:  Positive for light-headedness (Intermittent). Negative for dizziness, tremors, seizures, syncope, facial asymmetry, speech difficulty, weakness, numbness and headaches. Hematological:  Does not bruise/bleed easily. Psychiatric/Behavioral:  Negative for agitation, behavioral problems, confusion, decreased concentration, hallucinations, self-injury, sleep disturbance and suicidal ideas. The patient is not nervous/anxious.         Current Outpatient Medications:     rosuvastatin (CRESTOR) 20 MG tablet, Take 1 tablet by mouth daily, Disp: 90 tablet, Rfl: 1    losartan-hydroCHLOROthiazide (HYZAAR) 100-12.5 MG per tablet, TAKE 1 TABLET DAILY, Disp: 90 tablet, Rfl: 1    albuterol sulfate  (90 Base) MCG/ACT inhaler, inhale 2 puffs by mouth INTO THE LUNGS SIX TIMES PER DAY if needed, Disp: 1 Inhaler, Rfl: 2    Multiple Vitamin (MULTI VITAMIN DAILY PO), Take 1 tablet by mouth daily , Disp: , Rfl:     aspirin 81 MG tablet, Take 81 mg by mouth daily, Disp: , Rfl:   No Known Allergies    Past Medical History:   Diagnosis Date    Allergic rhinitis     Carotid artery stenosis     Hypertension     Vitamin B12 deficiency      Past Surgical History:   Procedure Laterality Date    CAROTID ENDARTERECTOMY Right 3/31/2022    RIGHT CAROTID ENDARTERECTOMY performed by Stefan Oconnell MD at [place] [id number]  5/8/2018     LYMPH NODE BIOPSY     Family History   Problem Relation Age of Onset    High Blood Pressure Mother     Diabetes Father     Prostate Cancer Father     High Blood Pressure Father     Diabetes Sister     High Blood Pressure Sister     Stroke Neg Hx     Heart Attack Neg Hx     Elevated Lipids Neg Hx     Coronary Art Dis Neg Hx     Breast Cancer Neg Hx     Heart Failure Neg Hx      Social History     Socioeconomic History    Marital status:      Spouse name: Not on file    Number of children: Not on file    Years of education: Not on file    Highest education level: Not on file   Occupational History    Not on file   Tobacco Use    Smoking status: Former     Packs/day: 1.00     Years: 25.00     Pack years: 25.00     Types: Cigarettes     Start date: 63     Quit date:      Years since quittin.    Smokeless tobacco: Never   Vaping Use    Vaping Use: Never used   Substance and Sexual Activity    Alcohol use: Yes     Comment: occassionally    Drug use: Not Currently    Sexual activity: Not on file   Other Topics Concern    Not on file   Social History Narrative    Not on file     Social Determinants of Health     Financial Resource Strain: Low Risk     Difficulty of Paying Living Expenses: Not hard at all   Food Insecurity: No Food Insecurity    Worried About Running Out of Food in the Last Year: Never true    Ran Out of Food in the Last Year: Never true   Transportation Needs: Not on file   Physical Activity: Not on file   Stress: Not on file   Social Connections: Not on file   Intimate Partner Violence: Not on file   Housing Stability: Not on file       Vitals:    10/04/22 0859 10/04/22 0927   BP: (!) 144/78 138/76   Site: Left Upper Arm    Position: Sitting    Cuff Size: Large Adult    Pulse: 80    Temp: 98 °F (36.7 °C)    TempSrc: Temporal    SpO2: 95%    Weight: 185 lb 6.4 oz (84.1 kg)        Physical Exam  Vitals and nursing note reviewed. Constitutional:       General: She is awake. She is not in acute distress. Appearance: Normal appearance. She is well-developed. She is obese. She is not ill-appearing, toxic-appearing or diaphoretic. HENT:      Head: Normocephalic and atraumatic. Right Ear: Hearing and external ear normal. There is no impacted cerumen. Left Ear: Hearing and external ear normal. There is no impacted cerumen. Nose: Nose normal. No congestion or rhinorrhea. Mouth/Throat:      Lips: Pink. No lesions. Mouth: Mucous membranes are moist.      Pharynx: Oropharynx is clear. No oropharyngeal exudate or posterior oropharyngeal erythema. Eyes:      General: Lids are normal. Vision grossly intact. Gaze aligned appropriately. No scleral icterus. Right eye: No discharge. Left eye: No discharge. Extraocular Movements: Extraocular movements intact. Conjunctiva/sclera: Conjunctivae normal.      Right eye: Right conjunctiva is not injected. Left eye: Left conjunctiva is not injected. Pupils: Pupils are equal, round, and reactive to light. Neck:      Thyroid: No thyromegaly. Vascular: No carotid bruit. Trachea: Trachea normal.   Cardiovascular:      Rate and Rhythm: Normal rate. Rhythm regularly irregular. Pulses: Normal pulses. Heart sounds: Normal heart sounds, S1 normal and S2 normal. No murmur heard. No friction rub. No gallop. Pulmonary:      Effort: Pulmonary effort is normal. No tachypnea, accessory muscle usage or respiratory distress. Breath sounds: Normal breath sounds and air entry. No stridor. No wheezing, rhonchi or rales. Chest:      Chest wall: No tenderness. Abdominal:      General: Bowel sounds are normal. There is no distension. Palpations: Abdomen is soft. There is no mass. Tenderness: There is no abdominal tenderness. There is no right CVA tenderness, left CVA tenderness, guarding or rebound. Hernia: No hernia is present.    Musculoskeletal:         General: No swelling, tenderness, deformity or signs of injury. Normal range of motion. Cervical back: Full passive range of motion without pain, normal range of motion and neck supple. No rigidity. No muscular tenderness. Right lower leg: No edema. Left lower leg: No edema. Lymphadenopathy:      Cervical: No cervical adenopathy. Skin:     General: Skin is warm and dry. Capillary Refill: Capillary refill takes less than 2 seconds. Coloration: Skin is not jaundiced or pale. Findings: No bruising, erythema, lesion or rash. Neurological:      General: No focal deficit present. Mental Status: She is alert and oriented to person, place, and time. Mental status is at baseline. Cranial Nerves: Cranial nerves are intact. No cranial nerve deficit. Sensory: Sensation is intact. No sensory deficit. Motor: Motor function is intact. No weakness. Coordination: Coordination is intact. Coordination normal.      Gait: Gait is intact. Gait normal.      Deep Tendon Reflexes: Reflexes normal.   Psychiatric:         Attention and Perception: Attention and perception normal.         Mood and Affect: Mood and affect normal.         Speech: Speech normal.         Behavior: Behavior normal. Behavior is cooperative. Thought Content: Thought content normal.         Cognition and Memory: Cognition and memory normal.         Judgment: Judgment normal.     Assessment and Plan:  Eliseo Garcia was seen today for 3 month follow-up and discuss labs. Diagnoses and all orders for this visit:    Essential hypertension  Comments:  Chronic, stable  Orders:  -     CBC with Auto Differential; Future    Mixed hyperlipidemia  Comments:  Chronic, stable  Orders:  -     Lipid Panel; Future    Chronic obstructive pulmonary disease, unspecified COPD type ([facility] [address]  Comments:  Chronic, stable    Lightheaded  Comments:  Chronic, stable  Orders:  -     TSH;  Future    Carotid stenosis, asymptomatic, bilateral  Comments:  Chronic, stable    [facility]  Comments:  Chronic, stable    IFG (impaired fasting glucose)  Comments:  Chronic, uncontrolled  Orders:  -     Comprehensive Metabolic Panel; Future  -     Hemoglobin A1C; Future    *Mal Davila is seen today for 3-month follow-up and review of her overall condition plan of care. She is also here to discuss labs, that she has had recently drawn. She reports that she is doing fairly well today, but states that she does experience some right upper quadrant abdominal pain and nausea, when overeating. We did an ultrasound of the gallbladder which was negative, although it did show some fatty infiltration of the liver. She continues to report chronic arthralgias to bilateral knees, but states that they are no worse than normal.  States that she has chronic back pain but worsens when she is bending over and over exerting herself. Lastly, she reports some intermittent lightheadedness. She reports that this can happen when she is sitting and when she is changing positions. She is accompanied by her . She denies any acute confusion, forgetfulness, any change in cognition. Discussions/Education provided to patients during visit:  [] Discussed the importance to stop smoking. [x] Advised to monitor eating habits. [] Reviewed and discussed Imaging results. [x] Reviewed and discussed Lab results. [x] Discussed the importance of drinking plenty of fluids. [x] Cut down on Salt, Caffeine, and Sugar. [x] Continue Medications as Discussed. [x] Communicated with patient any concerns, to phone office. Return in about 3 months (around 1/4/2023) for Review of chronic conditions, Lab Review. I spent  30 minutes  face-to-face with this patient.       Seen By:  SALMA Quinones NP

## 2022-10-12 DIAGNOSIS — I65.23 BILATERAL CAROTID ARTERY STENOSIS: Primary | ICD-10-CM

## 2022-10-17 ENCOUNTER — TELEPHONE (OUTPATIENT)
Dept: VASCULAR SURGERY | Age: 62
End: 2022-10-17

## 2022-10-18 ENCOUNTER — OFFICE VISIT (OUTPATIENT)
Dept: VASCULAR SURGERY | Age: 62
End: 2022-10-18
Payer: COMMERCIAL

## 2022-10-18 ENCOUNTER — HOSPITAL ENCOUNTER (OUTPATIENT)
Dept: CARDIOLOGY | Age: 62
Discharge: HOME | End: 2022-10-18
Payer: COMMERCIAL

## 2022-10-18 VITALS — BODY MASS INDEX: 31.58 KG/M2 | WEIGHT: 185 LBS | HEIGHT: 64 IN

## 2022-10-18 DIAGNOSIS — I65.23 BILATERAL CAROTID ARTERY STENOSIS: Primary | ICD-10-CM

## 2022-10-18 DIAGNOSIS — I65.23 BILATERAL CAROTID ARTERY STENOSIS: ICD-10-CM

## 2022-10-18 PROCEDURE — 93880 EXTRACRANIAL BILAT STUDY: CPT | Performed by: SURGERY

## 2022-10-18 PROCEDURE — 99213 OFFICE O/P EST LOW 20 MIN: CPT | Performed by: SURGERY

## 2022-10-18 PROCEDURE — 93880 EXTRACRANIAL BILAT STUDY: CPT

## 2022-10-18 NOTE — PROGRESS NOTES
[facility] Heart & Vascular Lab - [facility]    This is a pre read worksheet - prior to official physician interpretation    Kendal Shah  1960  Date of study: 10/18/22    Indication for study:  Carotid artery stenosis  Study : Bilateral Carotid Artery Duplex Examination    Duplex examination of the RIGHT carotid artery system identifies atherosclerotic plaque. The peak systolic velocity in internal carotid artery was 219 centimeters / second. The maximum end diastolic velocity was 62 centimeters / second. The ICA/CCA ratio is 2.4. The right vertebral artery has antegrade flow. Duplex examination of the LEFT carotid artery system identifies atherosclerotic plaque. The peak systolic velocity in internal carotid artery was 248 centimeters / second. The maximum end diastolic velocity was 80 centimeters / second. The ICA/CCA ratio is 2.0. The left vertebral artery has antegrade flow.     RIGHT  psv  LEFT  psv      LAST STUDY  11/29/2021 @ [facility]

## 2022-11-30 DIAGNOSIS — I10 ESSENTIAL HYPERTENSION: ICD-10-CM

## 2022-11-30 RX ORDER — LOSARTAN POTASSIUM AND HYDROCHLOROTHIAZIDE 12.5; 1 MG/1; MG/1
TABLET ORAL
Qty: 90 TABLET | Refills: 1 | Status: SHIPPED | OUTPATIENT
Start: 2022-11-30

## 2022-12-16 LAB
ALBUMIN SERPL-MCNC: NORMAL G/DL
ALP BLD-CCNC: NORMAL U/L
ALT SERPL-CCNC: NORMAL U/L
ANION GAP SERPL CALCULATED.3IONS-SCNC: NORMAL MMOL/L
AST SERPL-CCNC: NORMAL U/L
AVERAGE GLUCOSE: NORMAL
BASOPHILS ABSOLUTE: NORMAL
BASOPHILS RELATIVE PERCENT: NORMAL
BILIRUB SERPL-MCNC: NORMAL MG/DL
BUN BLDV-MCNC: NORMAL MG/DL
CALCIUM SERPL-MCNC: NORMAL MG/DL
CHLORIDE BLD-SCNC: NORMAL MMOL/L
CHOLESTEROL, TOTAL: NORMAL
CHOLESTEROL/HDL RATIO: NORMAL
CO2: NORMAL
CREAT SERPL-MCNC: NORMAL MG/DL
EOSINOPHILS ABSOLUTE: NORMAL
EOSINOPHILS RELATIVE PERCENT: NORMAL
GFR CALCULATED: NORMAL
GLUCOSE BLD-MCNC: NORMAL MG/DL
HBA1C MFR BLD: 6.5 %
HCT VFR BLD CALC: NORMAL %
HDLC SERPL-MCNC: NORMAL MG/DL
HEMOGLOBIN: NORMAL
LDL CHOLESTEROL CALCULATED: NORMAL
LYMPHOCYTES ABSOLUTE: NORMAL
LYMPHOCYTES RELATIVE PERCENT: NORMAL
MCH RBC QN AUTO: NORMAL PG
MCHC RBC AUTO-ENTMCNC: NORMAL G/DL
MCV RBC AUTO: NORMAL FL
MONOCYTES ABSOLUTE: NORMAL
MONOCYTES RELATIVE PERCENT: NORMAL
NEUTROPHILS ABSOLUTE: NORMAL
NEUTROPHILS RELATIVE PERCENT: NORMAL
NONHDLC SERPL-MCNC: NORMAL MG/DL
PDW BLD-RTO: NORMAL %
PLATELET # BLD: NORMAL 10*3/UL
PMV BLD AUTO: NORMAL FL
POTASSIUM SERPL-SCNC: NORMAL MMOL/L
RBC # BLD: NORMAL 10*6/UL
SODIUM BLD-SCNC: NORMAL MMOL/L
TOTAL PROTEIN: NORMAL
TRIGL SERPL-MCNC: NORMAL MG/DL
TSH SERPL DL<=0.05 MIU/L-ACNC: NORMAL M[IU]/L
VLDLC SERPL CALC-MCNC: NORMAL MG/DL
WBC # BLD: NORMAL 10*3/UL

## 2022-12-19 DIAGNOSIS — I10 ESSENTIAL HYPERTENSION: ICD-10-CM

## 2022-12-19 DIAGNOSIS — R42 LIGHTHEADED: ICD-10-CM

## 2022-12-19 DIAGNOSIS — R73.01 IFG (IMPAIRED FASTING GLUCOSE): ICD-10-CM

## 2022-12-19 DIAGNOSIS — E78.2 MIXED HYPERLIPIDEMIA: ICD-10-CM

## 2023-01-04 ENCOUNTER — OFFICE VISIT (OUTPATIENT)
Dept: PRIMARY CARE CLINIC | Age: 63
End: 2023-01-04
Payer: COMMERCIAL

## 2023-01-04 ENCOUNTER — TELEPHONE (OUTPATIENT)
Dept: PRIMARY CARE CLINIC | Age: 63
End: 2023-01-04

## 2023-01-04 VITALS
WEIGHT: 188 LBS | TEMPERATURE: 97.3 F | HEART RATE: 80 BPM | BODY MASS INDEX: 32.1 KG/M2 | DIASTOLIC BLOOD PRESSURE: 72 MMHG | SYSTOLIC BLOOD PRESSURE: 128 MMHG | HEIGHT: 64 IN | OXYGEN SATURATION: 96 %

## 2023-01-04 DIAGNOSIS — I73.9 PVD (PERIPHERAL VASCULAR DISEASE) (HCC): ICD-10-CM

## 2023-01-04 DIAGNOSIS — I10 ESSENTIAL HYPERTENSION: Primary | ICD-10-CM

## 2023-01-04 DIAGNOSIS — J44.9 CHRONIC OBSTRUCTIVE PULMONARY DISEASE, UNSPECIFIED COPD TYPE (HCC): ICD-10-CM

## 2023-01-04 DIAGNOSIS — R73.01 IFG (IMPAIRED FASTING GLUCOSE): ICD-10-CM

## 2023-01-04 DIAGNOSIS — E78.2 MIXED HYPERLIPIDEMIA: ICD-10-CM

## 2023-01-04 DIAGNOSIS — I65.23 CAROTID STENOSIS, ASYMPTOMATIC, BILATERAL: ICD-10-CM

## 2023-01-04 DIAGNOSIS — Z12.31 ENCOUNTER FOR SCREENING MAMMOGRAM FOR MALIGNANT NEOPLASM OF BREAST: ICD-10-CM

## 2023-01-04 PROCEDURE — 99214 OFFICE O/P EST MOD 30 MIN: CPT | Performed by: NURSE PRACTITIONER

## 2023-01-04 PROCEDURE — 3078F DIAST BP <80 MM HG: CPT | Performed by: NURSE PRACTITIONER

## 2023-01-04 PROCEDURE — 3074F SYST BP LT 130 MM HG: CPT | Performed by: NURSE PRACTITIONER

## 2023-01-04 ASSESSMENT — ENCOUNTER SYMPTOMS
PHOTOPHOBIA: 0
NAUSEA: 0
BACK PAIN: 0
ABDOMINAL PAIN: 0
CONSTIPATION: 0
SINUS PRESSURE: 0
EYE DISCHARGE: 0
EYE ITCHING: 0
WHEEZING: 0
ABDOMINAL DISTENTION: 0
FACIAL SWELLING: 0
COUGH: 0
BLOOD IN STOOL: 0
RHINORRHEA: 0
TROUBLE SWALLOWING: 0
SHORTNESS OF BREATH: 0
COLOR CHANGE: 0
SORE THROAT: 0
SINUS PAIN: 0
CHEST TIGHTNESS: 0
VOMITING: 0
VOICE CHANGE: 0
CHOKING: 0
EYE REDNESS: 0
DIARRHEA: 0
EYE PAIN: 0

## 2023-01-04 ASSESSMENT — PATIENT HEALTH QUESTIONNAIRE - PHQ9
SUM OF ALL RESPONSES TO PHQ QUESTIONS 1-9: 0
SUM OF ALL RESPONSES TO PHQ9 QUESTIONS 1 & 2: 0
2. FEELING DOWN, DEPRESSED OR HOPELESS: 0
SUM OF ALL RESPONSES TO PHQ QUESTIONS 1-9: 0
1. LITTLE INTEREST OR PLEASURE IN DOING THINGS: 0

## 2023-01-04 NOTE — TELEPHONE ENCOUNTER
Patient over due for mammo, scheduled her for Thomas Bronson today at checkout on January 18th, needs mammo order in system at your earliest convenience.

## 2023-01-04 NOTE — PROGRESS NOTES
23  Santosh Shoemaker : 1960 Sex: female  Age: 57 y.o. Chief Complaint   Patient presents with    3 Month Follow-Up     Go over blood work        Meghan Host is seen today for a 3-month follow-up and review of her overall condition plan of care. She is also here to discuss labs, that she has had recently drawn. She reports that she has been doing fairly well. She reports that she has intermittent abdominal pain to the right upper quadrant. She states that this is mostly after eating. She had an ultrasound of the gallbladder performed in 2022 and it showed some wall thickening, but no cholecystitis. She also reports some shortness of breath and arthralgias, that are related to the changes in weather. She reports some days are better for her breathing, than others. She reports that she has been having pain to her knees which she believes is related to overworking and putting all of her [place] decorations away. She is accompanied by her . She denies any acute confusion, forgetfulness or any change in cognition. Review of Systems   Constitutional:  Negative for appetite change, chills, diaphoresis, fatigue, fever and unexpected weight change. HENT:  Negative for congestion, ear pain, facial swelling, hearing loss, nosebleeds, postnasal drip, rhinorrhea, sinus pressure, sinus pain, sneezing, sore throat, tinnitus, trouble swallowing and voice change. Eyes:  Negative for photophobia, pain, discharge, redness and itching. Respiratory:  Negative for cough, choking, chest tightness, shortness of breath (Worsens on some days, related to weather) and wheezing. Cardiovascular:  Negative for chest pain, palpitations and leg swelling. Gastrointestinal:  Negative for abdominal distention, abdominal pain (Intermittent to RUQ, mostly after eating. US of gallbladder was performed in 2022.), blood in stool, constipation, diarrhea, nausea and vomiting.    Endocrine: Negative for cold intolerance, heat intolerance, polydipsia, polyphagia and polyuria. Genitourinary:  Negative for difficulty urinating, dysuria, flank pain, frequency, hematuria and urgency. Musculoskeletal:  Negative for arthralgias (Knees, related to overworking and putting [place] decorations away), back pain, gait problem, joint swelling, myalgias, neck pain and neck stiffness. Skin:  Positive for rash (To the top of her head). Negative for color change and wound. Allergic/Immunologic: Negative for environmental allergies and food allergies. Neurological:  Negative for dizziness, tremors, seizures, syncope, facial asymmetry, speech difficulty, weakness, light-headedness, numbness and headaches. Hematological:  Does not bruise/bleed easily. Psychiatric/Behavioral:  Negative for agitation, behavioral problems, confusion, decreased concentration, hallucinations, self-injury, sleep disturbance and suicidal ideas. The patient is not nervous/anxious.         Current Outpatient Medications:     losartan-hydroCHLOROthiazide (HYZAAR) 100-12.5 MG per tablet, TAKE 1 TABLET DAILY, Disp: 90 tablet, Rfl: 1    rosuvastatin (CRESTOR) 20 MG tablet, Take 1 tablet by mouth daily, Disp: 90 tablet, Rfl: 1    albuterol sulfate  (90 Base) MCG/ACT inhaler, inhale 2 puffs by mouth INTO THE LUNGS SIX TIMES PER DAY if needed, Disp: 1 Inhaler, Rfl: 2    Multiple Vitamin (MULTI VITAMIN DAILY PO), Take 1 tablet by mouth daily , Disp: , Rfl:     aspirin 81 MG tablet, Take 81 mg by mouth daily, Disp: , Rfl:   No Known Allergies    Past Medical History:   Diagnosis Date    Allergic rhinitis     Carotid artery stenosis     Hypertension     Vitamin B12 deficiency      Past Surgical History:   Procedure Laterality Date    CAROTID ENDARTERECTOMY Right 3/31/2022    RIGHT CAROTID ENDARTERECTOMY performed by Glenroy Ziegler MD at [place] [address]  5/8/2018     LYMPH NODE BIOPSY     Family History   Problem Relation Age of Onset    High Blood Pressure Mother     Diabetes Father     Prostate Cancer Father     High Blood Pressure Father     Diabetes Sister     High Blood Pressure Sister     Stroke Neg Hx     Heart Attack Neg Hx     Elevated Lipids Neg Hx     Coronary Art Dis Neg Hx     Breast Cancer Neg Hx     Heart Failure Neg Hx      Social History     Socioeconomic History    Marital status:      Spouse name: Not on file    Number of children: Not on file    Years of education: Not on file    Highest education level: Not on file   Occupational History    Not on file   Tobacco Use    Smoking status: Former     Packs/day: 1.00     Years: 25.00     Pack years: 25.00     Types: Cigarettes     Start date: 63     Quit date:      Years since quittin.0    Smokeless tobacco: Never   Vaping Use    Vaping Use: Never used   Substance and Sexual Activity    Alcohol use: Yes     Comment: occassionally    Drug use: Not Currently    Sexual activity: Not on file   Other Topics Concern    Not on file   Social History Narrative    Not on file     Social Determinants of Health     Financial Resource Strain: Low Risk     Difficulty of Paying Living Expenses: Not hard at all   Food Insecurity: No Food Insecurity    Worried About Running Out of Food in the Last Year: Never true    Ran Out of Food in the Last Year: Never true   Transportation Needs: Not on file   Physical Activity: Not on file   Stress: Not on file   Social Connections: Not on file   Intimate Partner Violence: Not on file   Housing Stability: Not on file       Vitals:    23 0900   BP: 128/72   Site: Right Upper Arm   Position: Sitting   Cuff Size: Large Adult   Pulse: 80   Temp: 97.3 °F (36.3 °C)   SpO2: 96%   Weight: 188 lb (85.3 kg)   Height: 5' 4\" (1.626 m)       Physical Exam  Vitals and nursing note reviewed. Constitutional:       General: She is awake. She is not in acute distress.      Appearance: Normal appearance. She is well-developed. She is obese. She is not ill-appearing, toxic-appearing or diaphoretic. HENT:      Head: Normocephalic and atraumatic. Right Ear: Hearing and external ear normal. There is no impacted cerumen. Left Ear: Hearing and external ear normal. There is no impacted cerumen. Nose: Nose normal. No congestion or rhinorrhea. Mouth/Throat:      Lips: Pink. No lesions. Mouth: Mucous membranes are moist.      Pharynx: Oropharynx is clear. No oropharyngeal exudate or posterior oropharyngeal erythema. Eyes:      General: Lids are normal. Vision grossly intact. Gaze aligned appropriately. No scleral icterus. Right eye: No discharge. Left eye: No discharge. Extraocular Movements: Extraocular movements intact. Conjunctiva/sclera: Conjunctivae normal.      Right eye: Right conjunctiva is not injected. Left eye: Left conjunctiva is not injected. Pupils: Pupils are equal, round, and reactive to light. Neck:      Thyroid: No thyromegaly. Vascular: No carotid bruit. Trachea: Trachea normal.   Cardiovascular:      Rate and Rhythm: Normal rate. Rhythm regularly regular. Pulses: Normal pulses. Heart sounds: Normal heart sounds, S1 normal and S2 normal. No murmur heard. No friction rub. No gallop. Pulmonary:      Effort: Pulmonary effort is normal. No tachypnea, accessory muscle usage or respiratory distress. Breath sounds: Normal breath sounds and air entry. No stridor. No wheezing, rhonchi or rales. Chest:      Chest wall: No tenderness. Abdominal:      General: Bowel sounds are normal. There is no distension. Palpations: Abdomen is soft. There is no mass. Tenderness: There is no abdominal tenderness. There is no right CVA tenderness, left CVA tenderness, guarding or rebound. Hernia: No hernia is present.    Musculoskeletal:         General: No swelling, tenderness, deformity or signs of injury. Normal range of motion. Cervical back: Full passive range of motion without pain, normal range of motion and neck supple. No rigidity. No muscular tenderness. Right lower leg: No edema. Left lower leg: No edema. Lymphadenopathy:      Cervical: No cervical adenopathy. Skin:     General: Skin is warm and dry. Capillary Refill: Capillary refill takes less than 2 seconds. Coloration: Skin is not jaundiced or pale. Findings: No bruising, erythema, lesion or rash. Neurological:      General: No focal deficit present. Mental Status: She is alert and oriented to person, place, and time. Mental status is at baseline. Cranial Nerves: Cranial nerves are intact. No cranial nerve deficit. Sensory: Sensation is intact. No sensory deficit. Motor: Motor function is intact. No weakness. Coordination: Coordination is intact. Coordination normal.      Gait: Gait is intact. Gait normal.      Deep Tendon Reflexes: Reflexes normal.   Psychiatric:         Attention and Perception: Attention and perception normal.         Mood and Affect: Mood and affect normal.         Speech: Speech normal.         Behavior: Behavior normal. Behavior is cooperative. Thought Content: Thought content normal.         Cognition and Memory: Cognition and memory normal.         Judgment: Judgment normal.     Assessment and Plan:  Bran Brooks was seen today for 3 month follow-up. Diagnoses and all orders for this visit:    Essential hypertension  Comments:  Chronic, stable    IFG (impaired fasting glucose)  Comments:  Chronic, stable  Orders:  -     Hemoglobin A1C; Future  -     Comprehensive Metabolic Panel;  Future    Mixed hyperlipidemia  Comments:  Chronic, stable, improved    Chronic obstructive pulmonary disease, unspecified COPD type ([facility] [address]  Comments:  Chronic, stable    PVD (peripheral vascular disease) ([address]  Comments:  Chronic, stable    Carotid stenosis, asymptomatic, bilateral  Comments:  Chronic, stable    Encounter for screening mammogram for malignant neoplasm of breast  -     [facility] DIGITAL SCREEN BILATERAL PER PROTOCOL; Future    *Autumn Lopez is seen today for a 3-month follow-up and review of her overall condition plan of care. She is also here to discuss labs, that she has had recently drawn. She reports that she has been doing fairly well. She reports that she has intermittent abdominal pain to the right upper quadrant. She states that this is mostly after eating. She had an ultrasound of the gallbladder performed in June 2022 and it showed some wall thickening, but no cholecystitis. She also reports some shortness of breath and arthralgias, that are related to the changes in weather. She reports some days are better for her breathing, than others. She reports that she has been having pain to her knees which she believes is related to overworking and putting all of her [place] decorations away. She is accompanied by her . She denies any acute confusion, forgetfulness or any change in cognition. Discussions/Education provided to patients during visit:  [] Discussed the importance to stop smoking. [x] Advised to monitor eating habits. [] Reviewed and discussed Imaging results. [] Reviewed and discussed Lab results. [x] Discussed the importance of drinking plenty of fluids. [] Cut down on Salt, Caffeine, and Sugar. [x] Continue Medications as Discussed. [x] Communicated with patient any concerns, to phone office. Return in about 3 months (around 4/4/2023). I spent  30 minutes  face-to-face with this patient.       Seen By:  SALMA Nicholson - NP

## 2023-01-10 ENCOUNTER — COMMUNITY OUTREACH (OUTPATIENT)
Dept: PRIMARY CARE CLINIC | Age: 63
End: 2023-01-10

## 2023-01-30 ENCOUNTER — APPOINTMENT (OUTPATIENT)
Dept: GENERAL RADIOLOGY | Age: 63
End: 2023-01-30
Payer: COMMERCIAL

## 2023-01-30 ENCOUNTER — OFFICE VISIT (OUTPATIENT)
Dept: PRIMARY CARE CLINIC | Age: 63
End: 2023-01-30
Payer: COMMERCIAL

## 2023-01-30 ENCOUNTER — HOSPITAL ENCOUNTER (EMERGENCY)
Age: 63
Discharge: HOME | End: 2023-01-30
Attending: EMERGENCY MEDICINE
Payer: COMMERCIAL

## 2023-01-30 VITALS
TEMPERATURE: 97.5 F | SYSTOLIC BLOOD PRESSURE: 132 MMHG | BODY MASS INDEX: 32.5 KG/M2 | DIASTOLIC BLOOD PRESSURE: 80 MMHG | HEART RATE: 77 BPM | OXYGEN SATURATION: 95 % | WEIGHT: 190.4 LBS | HEIGHT: 64 IN

## 2023-01-30 VITALS
BODY MASS INDEX: 32.44 KG/M2 | WEIGHT: 190 LBS | SYSTOLIC BLOOD PRESSURE: 193 MMHG | TEMPERATURE: 98.1 F | OXYGEN SATURATION: 97 % | HEART RATE: 85 BPM | RESPIRATION RATE: 16 BRPM | DIASTOLIC BLOOD PRESSURE: 92 MMHG | HEIGHT: 64 IN

## 2023-01-30 DIAGNOSIS — R07.89 CHEST PRESSURE: ICD-10-CM

## 2023-01-30 DIAGNOSIS — R06.02 SOB (SHORTNESS OF BREATH): Primary | ICD-10-CM

## 2023-01-30 DIAGNOSIS — R42 DIZZINESS: ICD-10-CM

## 2023-01-30 DIAGNOSIS — J44.1 COPD EXACERBATION (HCC): Primary | ICD-10-CM

## 2023-01-30 LAB
ANION GAP SERPL CALCULATED.3IONS-SCNC: 11 MMOL/L (ref 7–16)
BASOPHILS ABSOLUTE: 0.05 E9/L (ref 0–0.2)
BASOPHILS RELATIVE PERCENT: 0.4 % (ref 0–2)
BUN BLDV-MCNC: 12 MG/DL (ref 6–23)
CALCIUM SERPL-MCNC: 9.8 MG/DL (ref 8.6–10.2)
CHLORIDE BLD-SCNC: 101 MMOL/L (ref 98–107)
CO2: 29 MMOL/L (ref 22–29)
CREAT SERPL-MCNC: 0.5 MG/DL (ref 0.5–1)
D DIMER: <200 NG/ML DDU
EKG ATRIAL RATE: 76 BPM
EKG P AXIS: 32 DEGREES
EKG P-R INTERVAL: 154 MS
EKG Q-T INTERVAL: 416 MS
EKG QRS DURATION: 88 MS
EKG QTC CALCULATION (BAZETT): 468 MS
EKG R AXIS: 21 DEGREES
EKG T AXIS: 55 DEGREES
EKG VENTRICULAR RATE: 76 BPM
EOSINOPHILS ABSOLUTE: 0.5 E9/L (ref 0.05–0.5)
EOSINOPHILS RELATIVE PERCENT: 4.4 % (ref 0–6)
GFR SERPL CREATININE-BSD FRML MDRD: >60 ML/MIN/1.73
GLUCOSE BLD-MCNC: 116 MG/DL (ref 74–99)
HCT VFR BLD CALC: 43.9 % (ref 34–48)
HEMOGLOBIN: 14.9 G/DL (ref 11.5–15.5)
IMMATURE GRANULOCYTES #: 0.03 E9/L
IMMATURE GRANULOCYTES %: 0.3 % (ref 0–5)
INFLUENZA A BY PCR: NOT DETECTED
INFLUENZA B BY PCR: NOT DETECTED
LYMPHOCYTES ABSOLUTE: 2.32 E9/L (ref 1.5–4)
LYMPHOCYTES RELATIVE PERCENT: 20.6 % (ref 20–42)
MAGNESIUM: 2.2 MG/DL (ref 1.6–2.6)
MCH RBC QN AUTO: 30.2 PG (ref 26–35)
MCHC RBC AUTO-ENTMCNC: 33.9 % (ref 32–34.5)
MCV RBC AUTO: 89 FL (ref 80–99.9)
MONOCYTES ABSOLUTE: 1.14 E9/L (ref 0.1–0.95)
MONOCYTES RELATIVE PERCENT: 10.1 % (ref 2–12)
NEUTROPHILS ABSOLUTE: 7.21 E9/L (ref 1.8–7.3)
NEUTROPHILS RELATIVE PERCENT: 64.2 % (ref 43–80)
PDW BLD-RTO: 11.9 FL (ref 11.5–15)
PLATELET # BLD: 340 E9/L (ref 130–450)
PMV BLD AUTO: 9.6 FL (ref 7–12)
POTASSIUM SERPL-SCNC: 4.4 MMOL/L (ref 3.5–5)
PRO-BNP: 63 PG/ML (ref 0–125)
RBC # BLD: 4.93 E12/L (ref 3.5–5.5)
SARS-COV-2, NAAT: NOT DETECTED
SODIUM BLD-SCNC: 141 MMOL/L (ref 132–146)
TROPONIN, HIGH SENSITIVITY: 10 NG/L (ref 0–9)
WBC # BLD: 11.3 E9/L (ref 4.5–11.5)

## 2023-01-30 PROCEDURE — 99214 OFFICE O/P EST MOD 30 MIN: CPT | Performed by: EMERGENCY MEDICINE

## 2023-01-30 PROCEDURE — 71045 X-RAY EXAM CHEST 1 VIEW: CPT

## 2023-01-30 PROCEDURE — 83735 ASSAY OF MAGNESIUM: CPT

## 2023-01-30 PROCEDURE — 2580000003 HC RX 258

## 2023-01-30 PROCEDURE — 83880 ASSAY OF NATRIURETIC PEPTIDE: CPT

## 2023-01-30 PROCEDURE — 87635 SARS-COV-2 COVID-19 AMP PRB: CPT

## 2023-01-30 PROCEDURE — 84484 ASSAY OF TROPONIN QUANT: CPT

## 2023-01-30 PROCEDURE — 85378 FIBRIN DEGRADE SEMIQUANT: CPT

## 2023-01-30 PROCEDURE — 85025 COMPLETE CBC W/AUTO DIFF WBC: CPT

## 2023-01-30 PROCEDURE — 93005 ELECTROCARDIOGRAM TRACING: CPT

## 2023-01-30 PROCEDURE — 80048 BASIC METABOLIC PNL TOTAL CA: CPT

## 2023-01-30 PROCEDURE — 3079F DIAST BP 80-89 MM HG: CPT | Performed by: EMERGENCY MEDICINE

## 2023-01-30 PROCEDURE — 87502 INFLUENZA DNA AMP PROBE: CPT

## 2023-01-30 PROCEDURE — 99285 EMERGENCY DEPT VISIT HI MDM: CPT

## 2023-01-30 PROCEDURE — 6370000000 HC RX 637 (ALT 250 FOR IP): Performed by: EMERGENCY MEDICINE

## 2023-01-30 PROCEDURE — 3075F SYST BP GE 130 - 139MM HG: CPT | Performed by: EMERGENCY MEDICINE

## 2023-01-30 PROCEDURE — 93000 ELECTROCARDIOGRAM COMPLETE: CPT | Performed by: EMERGENCY MEDICINE

## 2023-01-30 PROCEDURE — 94664 DEMO&/EVAL PT USE INHALER: CPT

## 2023-01-30 RX ORDER — IPRATROPIUM BROMIDE AND ALBUTEROL SULFATE 2.5; .5 MG/3ML; MG/3ML
1 SOLUTION RESPIRATORY (INHALATION) ONCE
Status: COMPLETED | OUTPATIENT
Start: 2023-01-30 | End: 2023-01-30

## 2023-01-30 RX ORDER — 0.9 % SODIUM CHLORIDE 0.9 %
1000 INTRAVENOUS SOLUTION INTRAVENOUS ONCE
Status: COMPLETED | OUTPATIENT
Start: 2023-01-30 | End: 2023-01-30

## 2023-01-30 RX ADMIN — SODIUM CHLORIDE 1000 ML: 9 INJECTION, SOLUTION INTRAVENOUS at 13:53

## 2023-01-30 RX ADMIN — IPRATROPIUM BROMIDE AND ALBUTEROL SULFATE 1 AMPULE: 2.5; .5 SOLUTION RESPIRATORY (INHALATION) at 15:31

## 2023-01-30 ASSESSMENT — ENCOUNTER SYMPTOMS
BACK PAIN: 0
PHOTOPHOBIA: 0
NAUSEA: 0
EYE PAIN: 0
COUGH: 0
VOMITING: 0
EYE DISCHARGE: 0
SHORTNESS OF BREATH: 1
NAUSEA: 0
FACIAL SWELLING: 0
EYE PAIN: 0
COUGH: 1
CONSTIPATION: 0
EYE REDNESS: 0
SORE THROAT: 0
WHEEZING: 0
BACK PAIN: 0
ABDOMINAL PAIN: 0
SINUS PRESSURE: 0
ABDOMINAL DISTENTION: 0
DIARRHEA: 0
SHORTNESS OF BREATH: 1
CHOKING: 0
DIARRHEA: 0
CHEST TIGHTNESS: 1
CHEST TIGHTNESS: 1
VOMITING: 0
SORE THROAT: 0

## 2023-01-30 ASSESSMENT — PAIN - FUNCTIONAL ASSESSMENT: PAIN_FUNCTIONAL_ASSESSMENT: NONE - DENIES PAIN

## 2023-01-30 NOTE — ED NOTES
Ambulated pt SPO2 96% on room air no complaints of SOB. Returned pt to bed will update providers.      Isa Hopper RN  01/30/23 2345

## 2023-01-30 NOTE — PROGRESS NOTES
Chief Complaint:   Dizziness, Shortness of Breath (Does have copd but has been using inhaler a lot ), Fatigue, Other (Chest pressure and she just gets a really massive headache /Symptoms started last week but didn't want to go to er), and Cough (Just started today )      History of Present Illness   HPI:  Saumya Mariee is a 57 y.o. female who presents to [facility] today for multiple issues with heart palpitations worsening for 1 week, when she has the heart palpitations she describes a 'tightness' in her chest and neck. This is new to her and has not occurred in the past.  Associated with these new symptoms is what she describes as dizziness when they occur, but the dizziness is fleeting and may only last seconds to minutes. Prior history of carotid endarterectomy 1 year ago by DR Tiffanie Patino. Prior to Visit Medications    Medication Sig Taking? Authorizing Provider   losartan-hydroCHLOROthiazide (HYZAAR) 100-12.5 MG per tablet TAKE 1 TABLET DAILY Yes Kareen Zamora MD   rosuvastatin (CRESTOR) 20 MG tablet Take 1 tablet by mouth daily Yes SALMA Suazo - ZELALEM   albuterol sulfate  (90 Base) MCG/ACT inhaler inhale 2 puffs by mouth INTO THE LUNGS SIX TIMES PER DAY if needed Yes SALMA Suazo - ZELALEM   Multiple Vitamin (MULTI VITAMIN DAILY PO) Take 1 tablet by mouth daily  Yes Historical Provider, MD   aspirin 81 MG tablet Take 81 mg by mouth daily Yes Historical Provider, MD       Review of Systems   Review of Systems   Constitutional:  Positive for activity change and fatigue. Negative for chills and fever. HENT:  Negative for ear pain, sinus pressure and sore throat. Eyes:  Negative for pain, discharge and redness. Respiratory:  Positive for chest tightness and shortness of breath. Negative for cough and wheezing. Cardiovascular:  Negative for chest pain. Gastrointestinal:  Negative for abdominal distention, diarrhea, nausea and vomiting.    Genitourinary: Negative for dysuria and frequency. Musculoskeletal:  Negative for arthralgias and back pain. Skin:  Negative for rash and wound. Neurological:  Positive for dizziness, light-headedness and headaches. Negative for weakness. Hematological:  Negative for adenopathy. Psychiatric/Behavioral: Negative. All other systems reviewed and are negative. Patient's medical, social, and family history reviewed    Past Medical History:  has a past medical history of Allergic rhinitis, Carotid artery stenosis, Hypertension, and Vitamin B12 deficiency. Past Surgical History:  has a past surgical history that includes Mouth surgery; Lumbar disc surgery; US BIOPSY LYMPH NODE (5/8/2018); and Carotid endarterectomy (Right, 3/31/2022). Social History:  reports that she quit smoking about 23 years ago. Her smoking use included cigarettes. She started smoking about 48 years ago. She has a 25.00 pack-year smoking history. She has never used smokeless tobacco. She reports current alcohol use. She reports that she does not currently use drugs. Family History: family history includes Diabetes in her father and sister; High Blood Pressure in her father, mother, and sister; Prostate Cancer in her father. Allergies: Patient has no known allergies. Physical Exam   Vital Signs:  /80 (Site: Right Upper Arm, Position: Sitting, Cuff Size: Large Adult)   Pulse 77   Temp 97.5 °F (36.4 °C)   Ht 5' 4\" (1.626 m)   Wt 190 lb 6.4 oz (86.4 kg)   SpO2 95%   BMI 32.68 kg/m²    Oxygen Saturation Interpretation: Normal.    Physical Exam  Vitals and nursing note reviewed. Constitutional:       Appearance: She is well-developed. HENT:      Head: Normocephalic and atraumatic. Right Ear: Hearing and external ear normal.      Left Ear: Hearing and external ear normal.      Nose: Nose normal.      Mouth/Throat:      Pharynx: Uvula midline.    Eyes:      General: Lids are normal.      Conjunctiva/sclera: Conjunctivae normal. Pupils: Pupils are equal, round, and reactive to light. Cardiovascular:      Rate and Rhythm: Normal rate and regular rhythm. Heart sounds: Normal heart sounds. No murmur heard. Pulmonary:      Effort: Pulmonary effort is normal. Tachypnea present. No respiratory distress. Breath sounds: No stridor. Decreased breath sounds and wheezing present. No rhonchi. Abdominal:      General: Bowel sounds are normal.      Palpations: Abdomen is soft. Abdomen is not rigid. Tenderness: There is no abdominal tenderness. There is no guarding or rebound. Musculoskeletal:      Cervical back: Normal range of motion and neck supple. Skin:     General: Skin is warm and dry. Findings: No abrasion or rash. Neurological:      Mental Status: She is alert and oriented to person, place, and time. GCS: GCS eye subscore is 4. GCS verbal subscore is 5. GCS motor subscore is 6. Cranial Nerves: No cranial nerve deficit. Sensory: No sensory deficit. Coordination: Coordination normal.      Gait: Gait normal.       Test Results Section   (All laboratory and radiology results have been personally reviewed by myself)  Labs:  No results found for this visit on 01/30/23. Imaging: All Radiology results interpreted by Radiologist unless otherwise noted. No results found. Assessment / Plan   Impression(s):  Rita Arrington was seen today for dizziness, shortness of breath, fatigue, other and cough. Diagnoses and all orders for this visit:    SOB (shortness of breath)  -     EKG 12 Lead - Clinic Performed    Dizziness  -     EKG 12 Lead - Clinic Performed    Chest pressure  -     EKG 12 Lead - Clinic Performed      Patient and  have elected to present to the [address] [place] [address] ED for further eval and treatment at this time.       New Medications     New Prescriptions    No medications on file       Electronically signed by Hattie Ram DO   DD: 1/30/23

## 2023-01-30 NOTE — ED TRIAGE NOTES
Department of Emergency Medicine  FIRST PROVIDER TRIAGE NOTE             Independent MLP           1/30/23  1:01 PM EST    Date of Encounter: 1/30/23   MRN: [id number]      HPI: Lex Lefort is a 57 y.o. female who presents to the ED for Fatigue (fatigue, sob, chest tightness, for wk, denies sx at this time.), Shortness of Breath (Hx of COPD. Inhaler at home not helping), and Chest Pain       ROS: Negative for fever or vomiting. PE: Gen Appearance/Constitutional: alert  HEENT: NC/NT. PERRLA,  Airway patent. Initial Plan of Care: All treatment areas with department are currently occupied. Plan to order/Initiate the following while awaiting opening in ED: labs, EKG, and imaging studies.   Initiate Treatment-Testing, Proceed toTreatment Area When Bed Available for ED Attending/MLP to Continue Care    Electronically signed by SALMA Mariano CNP   DD: 1/30/23

## 2023-01-30 NOTE — ED PROVIDER NOTES
[facility]  Department of Emergency Medicine     Written by: Maximiliano Snell MD  Patient Name: Shlomo Hunter  Attending Provider: Justin Ambrzi DO  Admit Date: 2023  1:02 PM  MRN: [id number]                   : 1960        Chief Complaint   Patient presents with    Fatigue     fatigue, sob, chest tightness, for wk, denies sx at this time. Shortness of Breath     Hx of COPD. Inhaler at home not helping    Chest Pain    - Chief complaint    This is a 58-year-old female with history of hypertension, COPD, carotid artery stenosis who presents ED with complaints of chest tightness intermittently with some shortness of breath for the past week. The patient states that there is no provocative or elevating factors. The patient states that she had no nausea or vomiting, or any other symptoms. She denies fevers, chills, diaphoresis. She does mention that she had some cough last week. She also felt some intermittent lightheadedness with the symptoms as well. No other sick contacts noted. The patient states that she had carotid artery surgery on the right side by Dr. Emerson Shaver last year. She says that she had some palpitations intermittently however this has been going on for years. Review of Systems   Constitutional:  Negative for appetite change, chills, diaphoresis and fever. HENT:  Negative for ear discharge, ear pain, facial swelling, sneezing and sore throat. Eyes:  Negative for photophobia and pain. Respiratory:  Positive for cough, chest tightness and shortness of breath. Negative for choking. Cardiovascular:  Positive for palpitations. Negative for chest pain. Gastrointestinal:  Negative for abdominal pain, constipation, diarrhea, nausea and vomiting. Genitourinary:  Negative for dysuria, enuresis, flank pain, frequency and hematuria. Musculoskeletal:  Negative for arthralgias, back pain, joint swelling, myalgias and neck pain.    Skin: Negative for pallor and rash. Neurological:  Positive for light-headedness. Negative for weakness and headaches. Physical Exam  Constitutional:       General: She is not in acute distress. Appearance: Normal appearance. She is not ill-appearing. HENT:      Head: Normocephalic and atraumatic. Nose: Nose normal. No congestion. Mouth/Throat:      Mouth: Mucous membranes are moist.      Pharynx: No posterior oropharyngeal erythema. Eyes:      General: No scleral icterus. Extraocular Movements: Extraocular movements intact. Pupils: Pupils are equal, round, and reactive to light. Cardiovascular:      Rate and Rhythm: Normal rate and regular rhythm. Pulses: Normal pulses. Heart sounds: Normal heart sounds. Pulmonary:      Effort: Pulmonary effort is normal. No respiratory distress. Breath sounds: No stridor. Wheezing present. No rhonchi. Chest:      Chest wall: No tenderness. Abdominal:      General: Bowel sounds are normal. There is no distension. Palpations: Abdomen is soft. There is no mass. Tenderness: There is no abdominal tenderness. Musculoskeletal:         General: No swelling, tenderness or deformity. Normal range of motion. Cervical back: Normal range of motion. No rigidity or tenderness. Skin:     Capillary Refill: Capillary refill takes less than 2 seconds. Coloration: Skin is not jaundiced or pale. Findings: No erythema. Neurological:      General: No focal deficit present. Mental Status: She is alert and oriented to person, place, and time. Mental status is at baseline. Procedures       MDM  Number of Diagnoses or Management Options  COPD exacerbation ([facility] [address]  Diagnosis management comments: This is a 18-year-old female presents ED with complaint of chest tightness, shortness of breath intermittently with associated cough, lightheadedness for the past week.  The patient here in the ED is not hypoxic and hemodynamically stable, and does not appear to be in acute distress. They are calm, alert, oriented, and pleasant to speak with. On auscultation, the patient's lungs have wheezes, no abnormal heart murmurs are heard. The patient has no abdominal pain or tenderness. The patient has good pulses and capillary refill bilaterally in both upper and lower extremities. No evidence of extremity or perioral cyanosis. No signs of peripheral clubbing. No signs of extremity swellings. No evidence of neck crepitus. The patient has no significant chest wall tenderness. Chest x-ray was ordered to evaluate for any signs of pneumonia, pneumothorax, pulmonary edema, rib fracture or other pathological process. Remainder of MDM will be in the ED course below. ED Course as of 01/30/23 2342   Mon Jan 30, 2023   1600 EKG: This EKG is signed and interpreted by me. Rate: 76  Rhythm: Sinus  Interpretation: no acute changes, normal axis, normal LA interval, normal QRS duration, no QT prolongation, no acute ST changes  Comparison: stable as compared to patient's most recent EKG [AH]   2189 Patient metabolic reveals no acute electrolyte normality. D-dimer is less than 200, less concerning for PE. No elevation in BNP. Troponin is 10 which is baseline for her. Viral swabs are negative. CBC reveals no acute elevation white count or anemia. Chest x-ray reveals no acute process. Given EKG findings, negative lab work, unlikely to be ACS or pulm embolism. Patient feels better after 3 DuoNeb's given, likely given her presentation of acute moderate COPD exacerbation. Patient states that she will follow-up with Dr. Oscar Brown for palpitations, which does not feel at this time. Return precautions given, patient is ready for discharge.  [AH]      ED Course User Index  [AH] Clyde Elder MD       Chart review: Upon chart review, patient was admitted on February 23, 2022, and was seen by Dr. Kamille Crouch for evaluation of bilateral carotid artery stenosis. She only had right-sided carotid enterectomy. Social determinants: Patient does not require placement this time. She has access to medical care and medication as needed. Chronic conditions limiting care: None at this time. --------------------------------------------- PAST HISTORY ---------------------------------------------  Past Medical History:  has a past medical history of Allergic rhinitis, Carotid artery stenosis, Hypertension, and Vitamin B12 deficiency. Past Surgical History:  has a past surgical history that includes Mouth surgery; Lumbar disc surgery; US BIOPSY LYMPH NODE (5/8/2018); and Carotid endarterectomy (Right, 3/31/2022). Social History:  reports that she quit smoking about 23 years ago. Her smoking use included cigarettes. She started smoking about 48 years ago. She has a 25.00 pack-year smoking history. She has never used smokeless tobacco. She reports current alcohol use. She reports that she does not currently use drugs. Family History: family history includes Diabetes in her father and sister; High Blood Pressure in her father, mother, and sister; Prostate Cancer in her father. The patients home medications have been reviewed. Allergies: Patient has no known allergies.     -------------------------------------------------- RESULTS -------------------------------------------------  Labs:  Results for orders placed or performed during the hospital encounter of 01/30/23   COVID-19, Rapid    Specimen: Nasopharyngeal Swab   Result Value Ref Range    SARS-CoV-2, NAAT Not Detected Not Detected   RAPID INFLUENZA A/B ANTIGENS    Specimen: Nasopharyngeal   Result Value Ref Range    Influenza A by PCR Not Detected Not Detected    Influenza B by PCR Not Detected Not Detected   CBC with Auto Differential   Result Value Ref Range    WBC 11.3 4.5 - 11.5 E9/L    RBC 4.93 3.50 - 5.50 E12/L    Hemoglobin 14.9 11.5 - 15.5 g/dL    Hematocrit 43.9 34.0 - 48.0 %    MCV 89.0 80.0 - 99.9 fL    MCH 30.2 26.0 - 35.0 pg    MCHC 33.9 32.0 - 34.5 %    RDW 11.9 11.5 - 15.0 fL    Platelets 614 162 - 181 E9/L    MPV 9.6 7.0 - 12.0 fL    Neutrophils % 64.2 43.0 - 80.0 %    Immature Granulocytes % 0.3 0.0 - 5.0 %    Lymphocytes % 20.6 20.0 - 42.0 %    Monocytes % 10.1 2.0 - 12.0 %    Eosinophils % 4.4 0.0 - 6.0 %    Basophils % 0.4 0.0 - 2.0 %    Neutrophils Absolute 7.21 1.80 - 7.30 E9/L    Immature Granulocytes # 0.03 E9/L    Lymphocytes Absolute 2.32 1.50 - 4.00 E9/L    Monocytes Absolute 1.14 (H) 0.10 - 0.95 E9/L    Eosinophils Absolute 0.50 0.05 - 0.50 E9/L    Basophils Absolute 0.05 0.00 - 0.20 E9/L   BMP   Result Value Ref Range    Sodium 141 132 - 146 mmol/L    Potassium 4.4 3.5 - 5.0 mmol/L    Chloride 101 98 - 107 mmol/L    CO2 29 22 - 29 mmol/L    Anion Gap 11 7 - 16 mmol/L    Glucose 116 (H) 74 - 99 mg/dL    BUN 12 6 - 23 mg/dL    Creatinine 0.5 0.5 - 1.0 mg/dL    Est, Glom Filt Rate >60 >=60 mL/min/1.73    Calcium 9.8 8.6 - 10.2 mg/dL   Troponin   Result Value Ref Range    Troponin, High Sensitivity 10 (H) 0 - 9 ng/L   Magnesium   Result Value Ref Range    Magnesium 2.2 1.6 - 2.6 mg/dL   Brain Natriuretic Peptide   Result Value Ref Range    Pro-BNP 63 0 - 125 pg/mL   D-Dimer, Quantitative   Result Value Ref Range    D-Dimer, Quant <200 ng/mL DDU   EKG 12 Lead   Result Value Ref Range    Ventricular Rate 76 BPM    Atrial Rate 76 BPM    P-R Interval 154 ms    QRS Duration 88 ms    Q-T Interval 416 ms    QTc Calculation (Bazett) 468 ms    P Axis 32 degrees    R Axis 21 degrees    T Axis 55 degrees       Radiology:  XR CHEST PORTABLE   Final Result   Stable minimal left sided atelectasis and or scarring. No new abnormal   findings.              Medications:  Medications   0.9 % sodium chloride bolus (0 mLs IntraVENous Stopped 1/30/23 5493)   ipratropium-albuterol (DUONEB) nebulizer solution 1 ampule (1 ampule Inhalation Given 1/30/23 1352)       ------------------------- NURSING NOTES AND VITALS REVIEWED ---------------------------  Date / Time Roomed:  1/30/2023  1:02 PM  ED Bed Assignment:  06/06    The nursing notes within the ED encounter and vital signs as below have been reviewed. BP (!) 193/92   Pulse 85   Temp 98.1 °F (36.7 °C) (Oral)   Resp 16   Ht 5' 4\" (1.626 m)   Wt 190 lb (86.2 kg)   SpO2 97%   BMI 32.61 kg/m²     ------------------------------------------ PROGRESS NOTES ------------------------------------------  I have spoken with the patient and discussed todays results, in addition to providing specific details for the plan of care and counseling regarding the diagnosis and prognosis. Their questions are answered at this time and they are agreeable with the plan. I discussed at length with them reasons for immediate return here for re evaluation. They will followup with primary care by calling their office tomorrow. --------------------------------- ADDITIONAL PROVIDER NOTES ---------------------------------  At this time the patient is without objective evidence of an acute process requiring hospitalization or inpatient management. They have remained hemodynamically stable throughout their entire ED visit and are stable for discharge with outpatient follow-up. The plan has been discussed in detail and they are aware of the specific conditions for emergent return, as well as the importance of follow-up. Discharge Medication List as of 1/30/2023  4:53 PM          Diagnosis:  1. COPD exacerbation ([facility] [address]        Disposition:  Patient's disposition: Discharge to home  Patient's condition is stable. Patient was seen and evaluated by myself and my attending Hayden Scheuermann, DO. Assessment and Plan discussed with attending provider, please see attestation for final plan of care.      MD Elham Hameed MD  Resident  01/30/23 7122

## 2023-01-30 NOTE — DISCHARGE INSTRUCTIONS
Please return to the ED if symptoms worsen, persist, recur. Please take all your medications as prescribed. Please follow-up with PCP and cardiology  as discussed.

## 2023-01-31 ENCOUNTER — TELEPHONE (OUTPATIENT)
Dept: ADMINISTRATIVE | Age: 63
End: 2023-01-31

## 2023-02-01 NOTE — TELEPHONE ENCOUNTER
Pt called to schedule ED f/u with Dr. Rissa Lara. Pt went to [facility] ED for  fatigue, SOB and chest tightness. Pt was released same day and advised to f/u with Dr. Genesis Johns.

## 2023-02-02 ENCOUNTER — TELEPHONE (OUTPATIENT)
Dept: PRIMARY CARE CLINIC | Age: 63
End: 2023-02-02

## 2023-02-02 DIAGNOSIS — N63.20 MASS OF LEFT BREAST ON MAMMOGRAM: Primary | ICD-10-CM

## 2023-02-02 DIAGNOSIS — Z12.31 ENCOUNTER FOR SCREENING MAMMOGRAM FOR MALIGNANT NEOPLASM OF BREAST: ICD-10-CM

## 2023-02-02 NOTE — TELEPHONE ENCOUNTER
I called [facility] and they faxed results.  Wai reviewed and patient was informed of findings.  Nora is looking into why we did not have this back, yet from  1/16/23

## 2023-02-02 NOTE — TELEPHONE ENCOUNTER
----- Message from [facility] sent at 2/2/2023  9:57 AM EST -----  Subject: Results Request    QUESTIONS  Results: Mammogram results done at [facility] ; Ordered by:   Nacho Rodriguez   Date Performed: 2023-01-16  ---------------------------------------------------------------------------  --------------  Dustin Goncalves INFO    [phone]; OK to leave message on voicemail  ---------------------------------------------------------------------------  --------------

## 2023-02-08 ENCOUNTER — TELEPHONE (OUTPATIENT)
Dept: SURGERY | Age: 63
End: 2023-02-08

## 2023-02-08 NOTE — TELEPHONE ENCOUNTER
Received a call from the patient who is wanting to schedule the appt with Dr. Johnny Simmons. MA told her Myra Santiago MA will call back the patient. She verbalized understanding. Referral came from her PCP for breast mass. Forwarded message to Myra Santiago MA.   Electronically signed by Stacey Cabrera on 2/8/2023 at 1:56 PM

## 2023-02-09 ENCOUNTER — TELEPHONE (OUTPATIENT)
Dept: PRIMARY CARE CLINIC | Age: 63
End: 2023-02-09

## 2023-02-09 DIAGNOSIS — N63.20 MASS OF LEFT BREAST, UNSPECIFIED QUADRANT: Primary | ICD-10-CM

## 2023-02-09 DIAGNOSIS — N63.20 MASS OF LEFT BREAST ON MAMMOGRAM: ICD-10-CM

## 2023-02-13 ENCOUNTER — TELEPHONE (OUTPATIENT)
Dept: BREAST CENTER | Age: 63
End: 2023-02-13

## 2023-02-13 NOTE — TELEPHONE ENCOUNTER
Patient has been referred to the breast clinic. She had a bilateral screening mammogram at [place] in January which recommends a biopsy. Informed patient that we are going to request her imaging to be sent electronically for our radiologists review before ordering a biopsy. Patient states she takes a baby aspirin daily.

## 2023-02-15 ENCOUNTER — TELEPHONE (OUTPATIENT)
Dept: BREAST CENTER | Age: 63
End: 2023-02-15

## 2023-02-15 NOTE — TELEPHONE ENCOUNTER
Imaging has been received from [facility] detailed message requesting last 3 years of breast imaging reports to be faxed to our office at [phone]. Once we receive reports we will take everything to the radiologist for review.

## 2023-02-16 ENCOUNTER — TELEPHONE (OUTPATIENT)
Dept: BREAST CENTER | Age: 63
End: 2023-02-16

## 2023-02-16 ENCOUNTER — OFFICE VISIT (OUTPATIENT)
Dept: PRIMARY CARE CLINIC | Age: 63
End: 2023-02-16
Payer: COMMERCIAL

## 2023-02-16 VITALS
WEIGHT: 189 LBS | HEART RATE: 90 BPM | DIASTOLIC BLOOD PRESSURE: 74 MMHG | TEMPERATURE: 98.1 F | RESPIRATION RATE: 18 BRPM | HEIGHT: 64 IN | BODY MASS INDEX: 32.27 KG/M2 | OXYGEN SATURATION: 95 % | SYSTOLIC BLOOD PRESSURE: 130 MMHG

## 2023-02-16 DIAGNOSIS — N64.59 ABNORMAL BREAST FINDING: Primary | ICD-10-CM

## 2023-02-16 DIAGNOSIS — J44.1 COPD EXACERBATION (HCC): Primary | ICD-10-CM

## 2023-02-16 PROCEDURE — 99214 OFFICE O/P EST MOD 30 MIN: CPT | Performed by: NURSE PRACTITIONER

## 2023-02-16 PROCEDURE — 96372 THER/PROPH/DIAG INJ SC/IM: CPT | Performed by: NURSE PRACTITIONER

## 2023-02-16 PROCEDURE — 3078F DIAST BP <80 MM HG: CPT | Performed by: NURSE PRACTITIONER

## 2023-02-16 PROCEDURE — 3075F SYST BP GE 130 - 139MM HG: CPT | Performed by: NURSE PRACTITIONER

## 2023-02-16 RX ORDER — PREDNISONE 20 MG/1
40 TABLET ORAL DAILY
Qty: 10 TABLET | Refills: 0 | Status: SHIPPED | OUTPATIENT
Start: 2023-02-16 | End: 2023-02-21

## 2023-02-16 RX ORDER — ALBUTEROL SULFATE 90 UG/1
AEROSOL, METERED RESPIRATORY (INHALATION)
Qty: 1 EACH | Refills: 2 | Status: SHIPPED | OUTPATIENT
Start: 2023-02-16

## 2023-02-16 RX ORDER — CEFDINIR 300 MG/1
300 CAPSULE ORAL 2 TIMES DAILY
Qty: 20 CAPSULE | Refills: 0 | Status: SHIPPED | OUTPATIENT
Start: 2023-02-16 | End: 2023-02-26

## 2023-02-16 RX ORDER — IPRATROPIUM BROMIDE AND ALBUTEROL SULFATE 2.5; .5 MG/3ML; MG/3ML
1 SOLUTION RESPIRATORY (INHALATION) EVERY 4 HOURS
Qty: 360 ML | Refills: 2 | Status: SHIPPED | OUTPATIENT
Start: 2023-02-16

## 2023-02-16 RX ORDER — METHYLPREDNISOLONE ACETATE 40 MG/ML
40 INJECTION, SUSPENSION INTRA-ARTICULAR; INTRALESIONAL; INTRAMUSCULAR; SOFT TISSUE ONCE
Status: COMPLETED | OUTPATIENT
Start: 2023-02-16 | End: 2023-02-16

## 2023-02-16 RX ORDER — BENZONATATE 100 MG/1
100 CAPSULE ORAL 3 TIMES DAILY PRN
Qty: 30 CAPSULE | Refills: 0 | Status: SHIPPED | OUTPATIENT
Start: 2023-02-16 | End: 2023-02-26

## 2023-02-16 RX ADMIN — METHYLPREDNISOLONE ACETATE 40 MG: 40 INJECTION, SUSPENSION INTRA-ARTICULAR; INTRALESIONAL; INTRAMUSCULAR; SOFT TISSUE at 09:22

## 2023-02-16 SDOH — ECONOMIC STABILITY: FOOD INSECURITY: WITHIN THE PAST 12 MONTHS, THE FOOD YOU BOUGHT JUST DIDN'T LAST AND YOU DIDN'T HAVE MONEY TO GET MORE.: NEVER TRUE

## 2023-02-16 SDOH — ECONOMIC STABILITY: INCOME INSECURITY: HOW HARD IS IT FOR YOU TO PAY FOR THE VERY BASICS LIKE FOOD, HOUSING, MEDICAL CARE, AND HEATING?: NOT HARD AT ALL

## 2023-02-16 SDOH — ECONOMIC STABILITY: FOOD INSECURITY: WITHIN THE PAST 12 MONTHS, YOU WORRIED THAT YOUR FOOD WOULD RUN OUT BEFORE YOU GOT MONEY TO BUY MORE.: NEVER TRUE

## 2023-02-16 SDOH — ECONOMIC STABILITY: HOUSING INSECURITY
IN THE LAST 12 MONTHS, WAS THERE A TIME WHEN YOU DID NOT HAVE A STEADY PLACE TO SLEEP OR SLEPT IN A SHELTER (INCLUDING NOW)?: NO

## 2023-02-16 NOTE — PROGRESS NOTES
Chief Complaint   URI (Nasal congestion, cough, shortness of breath, started on 01/23/23 came on 01/30/23 she has not improved)      HPI   Source of history provided by: patient. Dimas Segovia is a 57 y.o. old female who presents to walk-in care for evaluation of chest congestion X 3 weeks. Associated symptoms include cough, chest congestion, and shortness of breath. Since onset symptoms have improved and then worsened. The patient is full vaccinated for COVID. Has taken nothing at home with some symptomatic relief. Patient reports that she presented to walk-in clinic on 1/30/2023 she was advised to go to the emergency room. She presented to the ER and they diagnosed her for a COPD exacerbation. They gave her nebulizer treatment in the hospital and discharged her on no medications. Patient reports that she is feeling worse and is concerned about possible pneumonia. Denies fever, chills, headache, sore throat, nasal congestion, rhinorrhea, nausea, vomiting, lethargy, body aches, otalgia, malaise, and sinus pressure. Pertinent PMH of: PMHpositive: COPD. Denies any PMH of URIhistory: asthma, recurrent bronchitis, and pneumonia. The patient has a history of tobacco abuse and has quit. ROS   Pertinent positives and negatives are stated within HPI, all other systems reviewed and are negative. Past Medical History:  has a past medical history of Allergic rhinitis, Carotid artery stenosis, Hypertension, and Vitamin B12 deficiency. Surgical History:  has a past surgical history that includes Mouth surgery; Lumbar disc surgery; US BIOPSY LYMPH NODE (5/8/2018); and Carotid endarterectomy (Right, 3/31/2022). Social History:  reports that she quit smoking about 23 years ago. Her smoking use included cigarettes. She started smoking about 48 years ago. She has a 25.00 pack-year smoking history. She has never used smokeless tobacco. She reports current alcohol use.  She reports that she does not currently use drugs. Family History: family history includes Diabetes in her father and sister; High Blood Pressure in her father, mother, and sister; Prostate Cancer in her father. Allergies: Patient has no known allergies. Physical Exam      VS:  /74 (Site: Right Upper Arm, Position: Sitting, Cuff Size: Large Adult)   Pulse 90   Temp 98.1 °F (36.7 °C) (Temporal)   Resp 18   Ht 5' 4\" (1.626 m)   Wt 189 lb (85.7 kg)   SpO2 95%   BMI 32.44 kg/m²    Oxygen Saturation Interpretation: Normal.    Physical Exam  Vitals and nursing note reviewed. Constitutional:       Appearance: Normal appearance. She is normal weight. HENT:      Head: Normocephalic and atraumatic. Right Ear: Ear canal and external ear normal. No middle ear effusion. Left Ear: Ear canal and external ear normal.  No middle ear effusion. Nose: Congestion and rhinorrhea present. Right Turbinates: Not swollen or pale. Left Turbinates: Not swollen or pale. Right Sinus: No maxillary sinus tenderness or frontal sinus tenderness. Left Sinus: No maxillary sinus tenderness or frontal sinus tenderness. Comments: Clear post nasal drip     Mouth/Throat:      Mouth: Mucous membranes are moist.      Pharynx: Oropharynx is clear. Eyes:      Extraocular Movements: Extraocular movements intact. Conjunctiva/sclera: Conjunctivae normal.      Pupils: Pupils are equal, round, and reactive to light. Cardiovascular:      Rate and Rhythm: Normal rate and regular rhythm. Pulses: Normal pulses. Heart sounds: Normal heart sounds. Pulmonary:      Effort: Pulmonary effort is normal.      Breath sounds: Examination of the right-lower field reveals rhonchi. Examination of the left-lower field reveals rhonchi. Wheezing and rhonchi present. No rales. Abdominal:      General: Bowel sounds are normal.      Palpations: Abdomen is soft. Tenderness: There is no abdominal tenderness.    Musculoskeletal: General: Normal range of motion. Cervical back: Normal range of motion and neck supple. Skin:     General: Skin is warm and dry. Capillary Refill: Capillary refill takes less than 2 seconds. Neurological:      General: No focal deficit present. Mental Status: She is alert and oriented to person, place, and time. Psychiatric:         Mood and Affect: Mood normal.         Behavior: Behavior normal.         Thought Content: Thought content normal.         Judgment: Judgment normal.         Lab / Imaging Results   (All laboratory and radiology results have been personally reviewed by myself)  Labs:  No results found for this visit on 02/16/23. Imaging: All Radiology results interpreted by Radiologist unless otherwise noted. No results found. Assessment/Plan  Makenna Alfredo was seen today for uri. Diagnoses and all orders for this visit:    COPD exacerbation ([facility] [address]  -     methylPREDNISolone acetate (DEPO-MEDROL) injection 40 mg  -     XR CHEST STANDARD (2 VW); Future  -     albuterol sulfate HFA (PROVENTIL;VENTOLIN;PROAIR) 108 (90 Base) MCG/ACT inhaler; inhale 2 puffs by mouth INTO THE LUNGS SIX TIMES PER DAY if needed  -     predniSONE (DELTASONE) 20 MG tablet; Take 2 tablets by mouth daily for 5 days  -     cefdinir (OMNICEF) 300 MG capsule; Take 1 capsule by mouth 2 times daily for 10 days  -     ipratropium-albuterol (DUONEB) 0.5-2.5 (3) MG/3ML SOLN nebulizer solution; Inhale 3 mLs into the lungs every 4 hours  -     benzonatate (TESSALON) 100 MG capsule; Take 1 capsule by mouth 3 times daily as needed for Cough    Chest x-ray in office is negative for any acute. Symptoms are consistent with a COPD exacerbation. Patient does have wheezing and rhonchi noted throughout lung fields. Depo-Medrol injection given today in office. Prescription written for albuterol inhaler, DuoNeb, cefdinir, benzonatate and prednisone burst, side effects and administration instructions were discussed.   I did advise patient to utilize DuoNeb treatments every 4 hours while awake for the next 48 hours. If symptoms worsen she needs to present to the emergency department for further evaluation. Increase fluids and rest.   Other symptomatic relief discussed including Tylenol prn pain/fever. Schedule f/u with PCP in 3-5 days if symptoms persist.  Go to ED sooner if symptoms worsen or change. ED immediately with high or refractory fever, progressive SOB, dyspnea, CP, calf pain/swelling, shaking chills, vomiting, abdominal pain, lethargy, flank pain, or decreased urinary output. Pt verbalizes understanding and is in agreement with plan of care. All questions answered. SALMA Wilde NP    *NOTE: This report was transcribed using voice recognition software. Every effort was made to ensure accuracy; however, inadvertent computerized transcription errors may be present.

## 2023-02-16 NOTE — TELEPHONE ENCOUNTER
Patient notified of outside film review and recommendations. She is on a baby aspirin daily, but it is not for anything specific. She will stop it 3 days prior. Orders placed and taken to [address] [place] [address]  to call patient.

## 2023-02-17 ENCOUNTER — TELEPHONE (OUTPATIENT)
Dept: PRIMARY CARE CLINIC | Age: 63
End: 2023-02-17

## 2023-02-17 RX ORDER — DOXYCYCLINE HYCLATE 100 MG
100 TABLET ORAL 2 TIMES DAILY
Qty: 14 TABLET | Refills: 0 | Status: SHIPPED | OUTPATIENT
Start: 2023-02-17 | End: 2023-02-24

## 2023-02-20 ENCOUNTER — OFFICE VISIT (OUTPATIENT)
Dept: PRIMARY CARE CLINIC | Age: 63
End: 2023-02-20
Payer: COMMERCIAL

## 2023-02-20 VITALS
HEIGHT: 64 IN | DIASTOLIC BLOOD PRESSURE: 70 MMHG | BODY MASS INDEX: 31.62 KG/M2 | TEMPERATURE: 97.9 F | WEIGHT: 185.2 LBS | OXYGEN SATURATION: 93 % | HEART RATE: 105 BPM | SYSTOLIC BLOOD PRESSURE: 122 MMHG

## 2023-02-20 DIAGNOSIS — N64.59 ABNORMAL BREAST FINDING: Primary | ICD-10-CM

## 2023-02-20 DIAGNOSIS — J44.1 COPD EXACERBATION (HCC): Primary | ICD-10-CM

## 2023-02-20 DIAGNOSIS — R73.01 IFG (IMPAIRED FASTING GLUCOSE): ICD-10-CM

## 2023-02-20 DIAGNOSIS — N63.20 MASS OF LEFT BREAST ON MAMMOGRAM: ICD-10-CM

## 2023-02-20 DIAGNOSIS — R06.02 SOB (SHORTNESS OF BREATH): ICD-10-CM

## 2023-02-20 DIAGNOSIS — E78.2 MIXED HYPERLIPIDEMIA: ICD-10-CM

## 2023-02-20 DIAGNOSIS — I73.9 PVD (PERIPHERAL VASCULAR DISEASE) (HCC): ICD-10-CM

## 2023-02-20 DIAGNOSIS — I10 ESSENTIAL HYPERTENSION: ICD-10-CM

## 2023-02-20 PROCEDURE — 3078F DIAST BP <80 MM HG: CPT | Performed by: NURSE PRACTITIONER

## 2023-02-20 PROCEDURE — 99214 OFFICE O/P EST MOD 30 MIN: CPT | Performed by: NURSE PRACTITIONER

## 2023-02-20 PROCEDURE — 3074F SYST BP LT 130 MM HG: CPT | Performed by: NURSE PRACTITIONER

## 2023-02-20 ASSESSMENT — ENCOUNTER SYMPTOMS
BACK PAIN: 0
EYE PAIN: 0
EYE REDNESS: 0
COLOR CHANGE: 0
BLOOD IN STOOL: 0
DIARRHEA: 0
CONSTIPATION: 0
ABDOMINAL DISTENTION: 0
CHEST TIGHTNESS: 0
SHORTNESS OF BREATH: 0
WHEEZING: 0
SINUS PAIN: 0
SINUS PRESSURE: 0
ABDOMINAL PAIN: 0
NAUSEA: 0
TROUBLE SWALLOWING: 0
FACIAL SWELLING: 0
COUGH: 1
VOMITING: 0
SORE THROAT: 0
PHOTOPHOBIA: 0
VOICE CHANGE: 0
EYE DISCHARGE: 0
CHOKING: 0
EYE ITCHING: 0
RHINORRHEA: 0

## 2023-02-20 ASSESSMENT — PATIENT HEALTH QUESTIONNAIRE - PHQ9
2. FEELING DOWN, DEPRESSED OR HOPELESS: 0
SUM OF ALL RESPONSES TO PHQ QUESTIONS 1-9: 0
SUM OF ALL RESPONSES TO PHQ9 QUESTIONS 1 & 2: 0
1. LITTLE INTEREST OR PLEASURE IN DOING THINGS: 0
SUM OF ALL RESPONSES TO PHQ QUESTIONS 1-9: 0

## 2023-02-20 NOTE — PROGRESS NOTES
23  Zeyad Click : 1960 Sex: female  Age: 57 y.o. Chief Complaint   Patient presents with    Other     Er f/u for copd   discuss use of nebulizer treatments is feeling a lot better       Erica Peraza is seen today for an ER and walk-in clinic follow-up. She reports the week of , she started to develop difficulty breathing, dyspnea on exertion and a cough. She notes the following week she came into walk-in clinic and was subsequently sent to the emergency department for evaluation. She was worked up and diagnosed with a COPD exacerbation. She states that they did not give her any medications, when she left the ER. She notes that she did not improve and she once again came into the walk-in clinic. She was given a number of different medications to treat her condition. She reports that she is doing well now and that most of her symptoms have greatly improved. She states that she is still coughing occasionally, but has been doing well with the nebulizer treatments. She is accompanied by her . She denies any acute confusion, forgetfulness or any change in cognition. Review of Systems   Constitutional:  Negative for appetite change, chills, diaphoresis, fatigue, fever and unexpected weight change. HENT:  Negative for congestion, ear pain, facial swelling, hearing loss, nosebleeds, postnasal drip, rhinorrhea, sinus pressure, sinus pain, sneezing, sore throat, tinnitus, trouble swallowing and voice change. Eyes:  Negative for photophobia, pain, discharge, redness and itching. Respiratory:  Positive for cough (Occasionally productive, usually in the mornings after breathing treatment). Negative for choking, chest tightness, shortness of breath and wheezing. Cardiovascular:  Positive for palpitations (From breathing treatments). Negative for chest pain and leg swelling.    Gastrointestinal:  Negative for abdominal distention, abdominal pain (Resolved to RUQ), blood in stool, constipation, diarrhea, nausea and vomiting. Endocrine: Negative for cold intolerance, heat intolerance, polydipsia, polyphagia and polyuria. Genitourinary:  Negative for difficulty urinating, dysuria, flank pain, frequency, hematuria and urgency. Musculoskeletal:  Negative for arthralgias, back pain, gait problem, joint swelling, myalgias, neck pain and neck stiffness. Skin:  Negative for color change, rash and wound. Allergic/Immunologic: Negative for environmental allergies and food allergies. Neurological:  Negative for dizziness, tremors, seizures, syncope, facial asymmetry, speech difficulty, weakness, light-headedness, numbness and headaches. Hematological:  Does not bruise/bleed easily. Psychiatric/Behavioral:  Negative for agitation, behavioral problems, confusion, decreased concentration, hallucinations, self-injury, sleep disturbance and suicidal ideas. The patient is not nervous/anxious.         Current Outpatient Medications:     doxycycline hyclate (VIBRA-TABS) 100 MG tablet, Take 1 tablet by mouth 2 times daily for 7 days, Disp: 14 tablet, Rfl: 0    albuterol sulfate HFA (PROVENTIL;VENTOLIN;PROAIR) 108 (90 Base) MCG/ACT inhaler, inhale 2 puffs by mouth INTO THE LUNGS SIX TIMES PER DAY if needed, Disp: 1 each, Rfl: 2    predniSONE (DELTASONE) 20 MG tablet, Take 2 tablets by mouth daily for 5 days, Disp: 10 tablet, Rfl: 0    cefdinir (OMNICEF) 300 MG capsule, Take 1 capsule by mouth 2 times daily for 10 days, Disp: 20 capsule, Rfl: 0    ipratropium-albuterol (DUONEB) 0.5-2.5 (3) MG/3ML SOLN nebulizer solution, Inhale 3 mLs into the lungs every 4 hours, Disp: 360 mL, Rfl: 2    benzonatate (TESSALON) 100 MG capsule, Take 1 capsule by mouth 3 times daily as needed for Cough, Disp: 30 capsule, Rfl: 0    losartan-hydroCHLOROthiazide (HYZAAR) 100-12.5 MG per tablet, TAKE 1 TABLET DAILY, Disp: 90 tablet, Rfl: 1    rosuvastatin (CRESTOR) 20 MG tablet, Take 1 tablet by mouth daily, Disp: 90 tablet, Rfl: 1    Multiple Vitamin (MULTI VITAMIN DAILY PO), Take 1 tablet by mouth daily , Disp: , Rfl:     aspirin 81 MG tablet, Take 81 mg by mouth daily, Disp: , Rfl:   No Known Allergies    Past Medical History:   Diagnosis Date    Allergic rhinitis     Carotid artery stenosis     Hypertension     Vitamin B12 deficiency      Past Surgical History:   Procedure Laterality Date    CAROTID ENDARTERECTOMY Right 3/31/2022    RIGHT CAROTID ENDARTERECTOMY performed by Pearl Dalton MD at [address] [place] [address] LYMPH NODE BIOPSY  2018     LYMPH NODE BIOPSY     Family History   Problem Relation Age of Onset    High Blood Pressure Mother     Diabetes Father     Prostate Cancer Father     High Blood Pressure Father     Diabetes Sister     High Blood Pressure Sister     Stroke Neg Hx     Heart Attack Neg Hx     Elevated Lipids Neg Hx     Coronary Art Dis Neg Hx     Breast Cancer Neg Hx     Heart Failure Neg Hx      Social History     Socioeconomic History    Marital status:      Spouse name: Not on file    Number of children: Not on file    Years of education: Not on file    Highest education level: Not on file   Occupational History    Not on file   Tobacco Use    Smoking status: Former     Packs/day: 1.00     Years: 25.00     Pack years: 25.00     Types: Cigarettes     Start date: 63     Quit date:      Years since quittin.    Smokeless tobacco: Never   Vaping Use    Vaping Use: Never used   Substance and Sexual Activity    Alcohol use: Yes     Comment: occassionally    Drug use: Not Currently    Sexual activity: Not on file   Other Topics Concern    Not on file   Social History Narrative    Not on file     Social Determinants of Health     Financial Resource Strain: Low Risk     Difficulty of Paying Living Expenses: Not hard at all   Food Insecurity: No Food Insecurity    Worried About Running Out of Food in the Last Year: Never true    Ran Out of Food in the Last Year: Never true   Transportation Needs: Unknown    Lack of Transportation (Medical): Not on file    Lack of Transportation (Non-Medical): No   Physical Activity: Not on file   Stress: Not on file   Social Connections: Not on file   Intimate Partner Violence: Not on file   Housing Stability: Unknown    Unable to Pay for Housing in the Last Year: Not on file    Number of Places Lived in the Last Year: Not on file    Unstable Housing in the Last Year: No       Vitals:    02/20/23 0837 02/20/23 0857   BP: 122/70    Pulse: (!) 116 (!) 105  Comment: She reports that she did an albuterol treatment, before coming in to office   Temp: 97.9 °F (36.6 °C)    SpO2: 93%    Weight: 185 lb 3.2 oz (84 kg)    Height: 5' 4\" (1.626 m)        Physical Exam  Vitals and nursing note reviewed. Constitutional:       General: She is awake. She is not in acute distress. Appearance: Normal appearance. She is well-developed. She is obese. She is not ill-appearing, toxic-appearing or diaphoretic. HENT:      Head: Normocephalic and atraumatic. Right Ear: Hearing and external ear normal. There is no impacted cerumen. Left Ear: Hearing and external ear normal. There is no impacted cerumen. Nose: Nose normal. No congestion or rhinorrhea. Mouth/Throat:      Lips: Pink. No lesions. Mouth: Mucous membranes are moist.      Pharynx: Oropharynx is clear. No oropharyngeal exudate or posterior oropharyngeal erythema. Eyes:      General: Lids are normal. Vision grossly intact. Gaze aligned appropriately. No scleral icterus. Right eye: No discharge. Left eye: No discharge. Extraocular Movements: Extraocular movements intact. Conjunctiva/sclera: Conjunctivae normal.      Right eye: Right conjunctiva is not injected. Left eye: Left conjunctiva is not injected. Pupils: Pupils are equal, round, and reactive to light. Neck:      Thyroid: No thyromegaly.       Vascular: No carotid bruit.      Trachea: Trachea normal.   Cardiovascular:      Rate and Rhythm: Tachycardic rate. Rhythm regularly regular. Pulses: Normal pulses. Heart sounds: Normal heart sounds, S1 normal and S2 normal. No murmur heard. No friction rub. No gallop. Pulmonary:      Effort: Pulmonary effort is normal. No tachypnea, accessory muscle usage or respiratory distress. Breath sounds: Normal breath sounds and air entry. No stridor. Scattered wheezing noted to posterior lung fields, but no rhonchi or rales. Chest:      Chest wall: No tenderness. Abdominal:      General: Bowel sounds are normal. There is no distension. Palpations: Abdomen is soft. There is no mass. Tenderness: There is no abdominal tenderness. There is no right CVA tenderness, left CVA tenderness, guarding or rebound. Hernia: No hernia is present. Musculoskeletal:         General: No swelling, tenderness, deformity or signs of injury. Normal range of motion. Cervical back: Full passive range of motion without pain, normal range of motion and neck supple. No rigidity. No muscular tenderness. Right lower leg: No edema. Left lower leg: No edema. Lymphadenopathy:      Cervical: No cervical adenopathy. Skin:     General: Skin is warm and dry. Capillary Refill: Capillary refill takes less than 2 seconds. Coloration: Skin is not jaundiced or pale. Findings: No bruising, erythema, lesion or rash. Neurological:      General: No focal deficit present. Mental Status: She is alert and oriented to person, place, and time. Mental status is at baseline. Cranial Nerves: Cranial nerves are intact. No cranial nerve deficit. Sensory: Sensation is intact. No sensory deficit. Motor: Motor function is intact. No weakness. Coordination: Coordination is intact. Coordination normal.      Gait: Gait is intact.  Gait normal.      Deep Tendon Reflexes: Reflexes normal.   Psychiatric: Attention and Perception: Attention and perception normal.         Mood and Affect: Mood and affect normal.         Speech: Speech normal.         Behavior: Behavior normal. Behavior is cooperative. Thought Content: Thought content normal.         Cognition and Memory: Cognition and memory normal.         Judgment: Judgment normal.     Assessment and Plan:  Lino Kat was seen today for other. Diagnoses and all orders for this visit:    COPD exacerbation ([facility] [address]  Comments:  Chronic, greatly improved since most recent walk-in visit    SOB (shortness of breath)  Comments:  Chronic, improved    Essential hypertension  Comments:  Chronic, stable    IFG (impaired fasting glucose)  Comments:  Chronic, stable    Mixed hyperlipidemia  Comments:  Chronic, stable    PVD (peripheral vascular disease) (HCC)  Comments:  Chronic, stable    Mass of left breast on mammogram  Comments:  [facility]    *Lino Kat is seen today for an ER and walk-in clinic follow-up. She reports the week of Jan 23rd, she started to develop difficulty breathing, dyspnea on exertion and a cough. She notes the following week she came into walk-in clinic and was subsequently sent to the emergency department for evaluation. She was worked up and diagnosed with a COPD exacerbation. She states that they did not give her any medications, when she left the ER. She notes that she did not improve and she once again came into the walk-in clinic. She was given a number of different medications to treat her condition. She reports that she is doing well now and that most of her symptoms have greatly improved. She states that she is still coughing occasionally, but has been doing well with the nebulizer treatments. She is accompanied by her . She denies any acute confusion, forgetfulness or any change in cognition.     Discussions/Education provided to patients during visit:  [] Discussed the importance to stop smoking. [] Advised to monitor eating habits. [] Reviewed and discussed Imaging results. [] Reviewed and discussed Lab results. [x] Discussed the importance of drinking plenty of fluids. [] Cut down on Salt, Caffeine, and Sugar. [x] Continue Medications as Discussed. [x] Communicated with patient any concerns, to phone office. Return in about 6 weeks (around 4/5/2023) for Review of chronic conditions, Lab Review. I spent  30 minutes  face-to-face with this patient.       Seen By:  SALMA Lee NP

## 2023-03-13 ENCOUNTER — HOSPITAL ENCOUNTER (OUTPATIENT)
Dept: GENERAL RADIOLOGY | Age: 63
Discharge: HOME | End: 2023-03-15
Payer: COMMERCIAL

## 2023-03-13 VITALS — HEIGHT: 64 IN | WEIGHT: 185 LBS | BODY MASS INDEX: 31.58 KG/M2

## 2023-03-13 DIAGNOSIS — N64.59 ABNORMAL BREAST FINDING: ICD-10-CM

## 2023-03-13 DIAGNOSIS — N63.20 MASS OF LEFT BREAST ON MAMMOGRAM: Primary | ICD-10-CM

## 2023-03-13 PROCEDURE — 77065 DX MAMMO INCL CAD UNI: CPT

## 2023-03-13 PROCEDURE — 76642 ULTRASOUND BREAST LIMITED: CPT

## 2023-03-13 PROCEDURE — G0279 TOMOSYNTHESIS, MAMMO: HCPCS

## 2023-03-14 ENCOUNTER — TELEPHONE (OUTPATIENT)
Dept: BREAST CENTER | Age: 63
End: 2023-03-14

## 2023-03-14 ENCOUNTER — OFFICE VISIT (OUTPATIENT)
Dept: PRIMARY CARE CLINIC | Age: 63
End: 2023-03-14
Payer: COMMERCIAL

## 2023-03-14 VITALS
HEIGHT: 64 IN | RESPIRATION RATE: 18 BRPM | DIASTOLIC BLOOD PRESSURE: 70 MMHG | WEIGHT: 187.6 LBS | TEMPERATURE: 97.5 F | HEART RATE: 112 BPM | OXYGEN SATURATION: 94 % | SYSTOLIC BLOOD PRESSURE: 110 MMHG | BODY MASS INDEX: 32.03 KG/M2

## 2023-03-14 DIAGNOSIS — R06.02 SOB (SHORTNESS OF BREATH): ICD-10-CM

## 2023-03-14 DIAGNOSIS — J44.1 CHRONIC OBSTRUCTIVE PULMONARY DISEASE WITH ACUTE EXACERBATION (HCC): Primary | ICD-10-CM

## 2023-03-14 DIAGNOSIS — R06.2 WHEEZING: ICD-10-CM

## 2023-03-14 DIAGNOSIS — R05.3 CHRONIC COUGH: ICD-10-CM

## 2023-03-14 PROCEDURE — 99213 OFFICE O/P EST LOW 20 MIN: CPT | Performed by: NURSE PRACTITIONER

## 2023-03-14 PROCEDURE — 3074F SYST BP LT 130 MM HG: CPT | Performed by: NURSE PRACTITIONER

## 2023-03-14 PROCEDURE — 3078F DIAST BP <80 MM HG: CPT | Performed by: NURSE PRACTITIONER

## 2023-03-14 RX ORDER — PREDNISONE 20 MG/1
20 TABLET ORAL 2 TIMES DAILY
Qty: 10 TABLET | Refills: 0 | Status: SHIPPED | OUTPATIENT
Start: 2023-03-14 | End: 2023-03-19

## 2023-03-14 RX ORDER — BUDESONIDE AND FORMOTEROL FUMARATE DIHYDRATE 160; 4.5 UG/1; UG/1
2 AEROSOL RESPIRATORY (INHALATION) 2 TIMES DAILY
Qty: 30.6 G | Refills: 1 | Status: SHIPPED | OUTPATIENT
Start: 2023-03-14

## 2023-03-14 ASSESSMENT — PATIENT HEALTH QUESTIONNAIRE - PHQ9
1. LITTLE INTEREST OR PLEASURE IN DOING THINGS: 0
SUM OF ALL RESPONSES TO PHQ QUESTIONS 1-9: 0
SUM OF ALL RESPONSES TO PHQ9 QUESTIONS 1 & 2: 0
SUM OF ALL RESPONSES TO PHQ QUESTIONS 1-9: 0
2. FEELING DOWN, DEPRESSED OR HOPELESS: 0

## 2023-03-14 NOTE — TELEPHONE ENCOUNTER
RN received return call from patient in regards to missed call from our office. Patient states she is comfortable with her results and doesn't feel a clinic follow up is needed at this time. Patient states her repeat imaging is scheduled for 9/12/2023. RN verbalized understanding. RN routing message to [employer] for informational purposes.         Electronically signed by Yael Austin RN on 3/14/23 at 9:06 AM EDT

## 2023-03-14 NOTE — TELEPHONE ENCOUNTER
Attempted to follow up with patient in regards to her referral to the breast clinic. She did not need a biopsy but has recommended 6-month follow-up imaging. Would like to know if patient would like to come in the meantime for a clinical follow-up. Left message on home phone. Attempted her cell phone and spoke to her  who will let her know to call us back.

## 2023-03-14 NOTE — PROGRESS NOTES
Chief Complaint:       Other (COPD episode  with lung congestion with intermittent dry and moist cough with gurgling and clear thick mucous and metal taste in mouth onset onset on 3/9/23  breathing treatments help some)      History of Present Illness   Source of history provided by:  patient and spouse/SO. Malcolm Malcolm is a 57 y.o. old female who presents to the [facility] with complaints of shortness of breath which began 5 days ago. Pt states she has a productive cough (clear/thick), but denies a subjective fever, mild intermittent HA, sore throat or nasal congestion. Since onset the symptoms have been worsened. She has started using her nebulizer machine, which helps to open her up, but does not last. Pt denies any CP, vomiting, abdominal pain, neck stiffness, or lethargy. At home treatment has been unsuccessful. Review of Systems   Unless otherwise stated in this report or unable to obtain because of the patient's clinical or mental status as evidenced by the medical record, this patients's positive and negative responses for Review of Systems, constitutional, psych, eyes, ENT, cardiovascular, respiratory, gastrointestinal, neurological, genitourinary, musculoskeletal, integument systems and systems related to the presenting problem are either stated in the preceding or were not pertinent or were negative for the symptoms and/or complaints related to the medical problem. Past Medical History:  has a past medical history of Allergic rhinitis, Carotid artery stenosis, Hypertension, and Vitamin B12 deficiency. Past Surgical History:  has a past surgical history that includes Mouth surgery; Lumbar disc surgery; US BIOPSY LYMPH NODE (5/8/2018); and Carotid endarterectomy (Right, 3/31/2022). Social History:  reports that she quit smoking about 23 years ago. Her smoking use included cigarettes. She started smoking about 48 years ago. She has a 25.00 pack-year smoking history.  She has never used smokeless tobacco. She reports current alcohol use. She reports that she does not currently use drugs. Family History: family history includes Diabetes in her father and sister; High Blood Pressure in her father, mother, and sister; Prostate Cancer in her father. Allergies: Patient has no known allergies. Physical Exam   Vital Signs:  /70 (Site: Left Upper Arm, Position: Sitting, Cuff Size: Large Adult)   Pulse (!) 112   Temp 97.5 °F (36.4 °C)   Resp 18   Ht 5' 4\" (1.626 m)   Wt 187 lb 9.6 oz (85.1 kg)   SpO2 94%   BMI 32.20 kg/m²    Oxygen Saturation Interpretation: Normal.    General Appearance/Constitutional:  Alert, NAD. HEENT:  NC/NT. PERRLA. Airway patent. Mild posterior pharyngeal erythema without edema. Neck:  Normal ROM. Supple. No adenopathy. Lungs: Inspiratory and expiratory breath sounds with end expiratory wheezing, but no rales or rhonchi. No prolonged expiratory phase. No retractions. Heart:  Regular rate and rhythm, normal heart sounds, without pathological murmurs, ectopy, gallops, or rubs. Abdomen:  Soft, nontender, good bowel sounds. No firm or pulsatile mass. Skin:  Normal turgor. Warm, dry, without visible rash. Extremities:  No clubbing, cyanosis, or edema bilaterally. Neurological:  Oriented x3. Motor functions intact. Test Results Section   (All laboratory and radiology results have been personally reviewed by myself)  Labs:  No results found for this visit on 03/14/23. Imaging: All Radiology results interpreted by Radiologist unless otherwise noted. Assessment / Plan     Impression(s):  Sandy Smith was seen today for other. Diagnoses and all orders for this visit:    Chronic obstructive pulmonary disease with acute exacerbation (HCC)  -     budesonide-formoterol (SYMBICORT) 160-4.5 MCG/ACT AERO; Inhale 2 puffs into the lungs 2 times daily  -     predniSONE (DELTASONE) 20 MG tablet;  Take 1 tablet by mouth 2 times daily for 5 days    Wheezing  -     predniSONE (DELTASONE) 20 MG tablet; Take 1 tablet by mouth 2 times daily for 5 days    Chronic cough    SOB (shortness of breath)      Pt advised to f/u with PCP at appointment, or sooner, if needed. ER if changes or worse. Advised to take all medications as directed. Return if symptoms worsen or fail to improve.

## 2023-03-25 LAB
ALBUMIN SERPL-MCNC: NORMAL G/DL
ALP BLD-CCNC: NORMAL U/L
ALT SERPL-CCNC: NORMAL U/L
ANION GAP SERPL CALCULATED.3IONS-SCNC: NORMAL MMOL/L
AST SERPL-CCNC: NORMAL U/L
AVERAGE GLUCOSE: NORMAL
BILIRUB SERPL-MCNC: NORMAL MG/DL
BUN BLDV-MCNC: NORMAL MG/DL
CALCIUM SERPL-MCNC: NORMAL MG/DL
CHLORIDE BLD-SCNC: NORMAL MMOL/L
CO2: NORMAL
CREAT SERPL-MCNC: NORMAL MG/DL
EGFR: NORMAL
GLUCOSE BLD-MCNC: NORMAL MG/DL
HBA1C MFR BLD: 6.9 %
POTASSIUM SERPL-SCNC: NORMAL MMOL/L
SODIUM BLD-SCNC: NORMAL MMOL/L
TOTAL PROTEIN: NORMAL

## 2023-03-27 DIAGNOSIS — R73.01 IFG (IMPAIRED FASTING GLUCOSE): ICD-10-CM

## 2023-04-05 ENCOUNTER — OFFICE VISIT (OUTPATIENT)
Dept: PRIMARY CARE CLINIC | Age: 63
End: 2023-04-05
Payer: COMMERCIAL

## 2023-04-05 VITALS
HEART RATE: 84 BPM | OXYGEN SATURATION: 95 % | WEIGHT: 187.6 LBS | HEIGHT: 64 IN | TEMPERATURE: 97.3 F | BODY MASS INDEX: 32.03 KG/M2 | DIASTOLIC BLOOD PRESSURE: 72 MMHG | SYSTOLIC BLOOD PRESSURE: 118 MMHG

## 2023-04-05 DIAGNOSIS — I65.23 CAROTID STENOSIS, ASYMPTOMATIC, BILATERAL: ICD-10-CM

## 2023-04-05 DIAGNOSIS — I73.9 PVD (PERIPHERAL VASCULAR DISEASE) (HCC): ICD-10-CM

## 2023-04-05 DIAGNOSIS — I10 ESSENTIAL HYPERTENSION: ICD-10-CM

## 2023-04-05 DIAGNOSIS — R73.01 IFG (IMPAIRED FASTING GLUCOSE): ICD-10-CM

## 2023-04-05 DIAGNOSIS — R53.83 FATIGUE, UNSPECIFIED TYPE: ICD-10-CM

## 2023-04-05 DIAGNOSIS — E78.2 MIXED HYPERLIPIDEMIA: ICD-10-CM

## 2023-04-05 DIAGNOSIS — J44.9 CHRONIC OBSTRUCTIVE PULMONARY DISEASE, UNSPECIFIED COPD TYPE (HCC): Primary | ICD-10-CM

## 2023-04-05 DIAGNOSIS — R49.0 VOICE HOARSENESS: ICD-10-CM

## 2023-04-05 PROCEDURE — 99214 OFFICE O/P EST MOD 30 MIN: CPT | Performed by: NURSE PRACTITIONER

## 2023-04-05 PROCEDURE — 3074F SYST BP LT 130 MM HG: CPT | Performed by: NURSE PRACTITIONER

## 2023-04-05 PROCEDURE — 3078F DIAST BP <80 MM HG: CPT | Performed by: NURSE PRACTITIONER

## 2023-04-05 RX ORDER — LOSARTAN POTASSIUM AND HYDROCHLOROTHIAZIDE 12.5; 1 MG/1; MG/1
1 TABLET ORAL DAILY
Qty: 90 TABLET | Refills: 1 | Status: SHIPPED | OUTPATIENT
Start: 2023-04-05

## 2023-04-05 RX ORDER — ROSUVASTATIN CALCIUM 20 MG/1
20 TABLET, COATED ORAL DAILY
Qty: 90 TABLET | Refills: 1 | Status: SHIPPED | OUTPATIENT
Start: 2023-04-05

## 2023-04-05 ASSESSMENT — ENCOUNTER SYMPTOMS
BLOOD IN STOOL: 0
RHINORRHEA: 0
WHEEZING: 0
EYE REDNESS: 0
PHOTOPHOBIA: 0
SINUS PAIN: 0
SORE THROAT: 0
VOMITING: 0
COUGH: 0
DIARRHEA: 0
FACIAL SWELLING: 0
VOICE CHANGE: 1
EYE DISCHARGE: 0
NAUSEA: 0
CHOKING: 0
CONSTIPATION: 0
ABDOMINAL DISTENTION: 0
SHORTNESS OF BREATH: 0
BACK PAIN: 1
CHEST TIGHTNESS: 0
SINUS PRESSURE: 0
EYE ITCHING: 0
COLOR CHANGE: 0
ABDOMINAL PAIN: 0
TROUBLE SWALLOWING: 0
EYE PAIN: 0

## 2023-04-05 NOTE — PROGRESS NOTES
(SYMBICORT) 160-4.5 MCG/ACT AERO, Inhale 2 puffs into the lungs 2 times daily, Disp: 30.6 g, Rfl: 1    albuterol sulfate HFA (PROVENTIL;VENTOLIN;PROAIR) 108 (90 Base) MCG/ACT inhaler, inhale 2 puffs by mouth INTO THE LUNGS SIX TIMES PER DAY if needed, Disp: 1 each, Rfl: 2    ipratropium-albuterol (DUONEB) 0.5-2.5 (3) MG/3ML SOLN nebulizer solution, Inhale 3 mLs into the lungs every 4 hours, Disp: 360 mL, Rfl: 2    Multiple Vitamin (MULTI VITAMIN DAILY PO), Take 1 tablet by mouth daily , Disp: , Rfl:     aspirin 81 MG tablet, Take 1 tablet by mouth daily, Disp: , Rfl:   No Known Allergies    Past Medical History:   Diagnosis Date    Allergic rhinitis     Carotid artery stenosis     Hypertension     Vitamin B12 deficiency      Past Surgical History:   Procedure Laterality Date    CAROTID ENDARTERECTOMY Right 3/31/2022    RIGHT CAROTID ENDARTERECTOMY performed by Darshan Gamez MD at [place] [id number]  2018     LYMPH NODE BIOPSY     Family History   Problem Relation Age of Onset    High Blood Pressure Mother     Diabetes Father     Prostate Cancer Father     High Blood Pressure Father     Diabetes Sister     High Blood Pressure Sister     Stroke Neg Hx     Heart Attack Neg Hx     Elevated Lipids Neg Hx     Coronary Art Dis Neg Hx     Breast Cancer Neg Hx     Heart Failure Neg Hx      Social History     Socioeconomic History    Marital status:      Spouse name: Not on file    Number of children: Not on file    Years of education: Not on file    Highest education level: Not on file   Occupational History    Not on file   Tobacco Use    Smoking status: Former     Packs/day: 1.00     Years: 25.00     Pack years: 25.00     Types: Cigarettes     Start date: 63     Quit date:      Years since quittin.2    Smokeless tobacco: Never   Vaping Use    Vaping Use: Never used   Substance and Sexual Activity    Alcohol use: Yes     Comment:

## 2023-04-23 DIAGNOSIS — E78.2 MIXED HYPERLIPIDEMIA: ICD-10-CM

## 2023-04-24 DIAGNOSIS — J44.9 CHRONIC OBSTRUCTIVE PULMONARY DISEASE, UNSPECIFIED COPD TYPE (HCC): ICD-10-CM

## 2023-04-24 NOTE — TELEPHONE ENCOUNTER
Patients last appointment 4/5/2023.   Patients next scheduled appointment   Future Appointments   Date Time Provider Zainab Herrera   7/5/2023  8:30 AM Zelda Gitelman, APRN - NP [facility]   9/12/2023  9:00 AM SEYZ ABDU ZEV RM 2 SEYZ ABDU BC [facility]   9/12/2023  9:30 AM SEYZ ABDU US RM 3 SEYZ ABDU BC SE [facility]   10/2/2023  8:00 AM DO Jack Acevedo ENT [facility]   10/24/2023  8:45 AM [facility] VAS US RM 1 SEYZ CARDIO [facility]   10/24/2023  9:15 AM Beto Novak MD [facility]/MED [facility]

## 2023-04-25 RX ORDER — ROSUVASTATIN CALCIUM 20 MG/1
TABLET, COATED ORAL
Qty: 90 TABLET | Refills: 1 | Status: SHIPPED | OUTPATIENT
Start: 2023-04-25

## 2023-05-01 DIAGNOSIS — J44.9 CHRONIC OBSTRUCTIVE PULMONARY DISEASE, UNSPECIFIED COPD TYPE (HCC): ICD-10-CM

## 2023-06-21 DIAGNOSIS — E78.2 MIXED HYPERLIPIDEMIA: ICD-10-CM

## 2023-06-21 DIAGNOSIS — R73.01 IFG (IMPAIRED FASTING GLUCOSE): ICD-10-CM

## 2023-06-21 DIAGNOSIS — J44.9 CHRONIC OBSTRUCTIVE PULMONARY DISEASE, UNSPECIFIED COPD TYPE (HCC): ICD-10-CM

## 2023-06-21 DIAGNOSIS — R53.83 FATIGUE, UNSPECIFIED TYPE: ICD-10-CM

## 2023-06-21 LAB
ALBUMIN SERPL-MCNC: NORMAL G/DL
ALP BLD-CCNC: NORMAL U/L
ALT SERPL-CCNC: NORMAL U/L
ANION GAP SERPL CALCULATED.3IONS-SCNC: NORMAL MMOL/L
AST SERPL-CCNC: NORMAL U/L
AVERAGE GLUCOSE: NORMAL
BASOPHILS ABSOLUTE: NORMAL
BASOPHILS RELATIVE PERCENT: NORMAL
BILIRUB SERPL-MCNC: NORMAL MG/DL
BUN BLDV-MCNC: NORMAL MG/DL
CALCIUM SERPL-MCNC: NORMAL MG/DL
CHLORIDE BLD-SCNC: NORMAL MMOL/L
CHOLESTEROL, TOTAL: NORMAL
CHOLESTEROL/HDL RATIO: NORMAL
CO2: NORMAL
CREAT SERPL-MCNC: NORMAL MG/DL
EGFR: NORMAL
EOSINOPHILS ABSOLUTE: NORMAL
EOSINOPHILS RELATIVE PERCENT: NORMAL
GLUCOSE BLD-MCNC: NORMAL MG/DL
HBA1C MFR BLD: 6.8 %
HCT VFR BLD CALC: NORMAL %
HDLC SERPL-MCNC: NORMAL MG/DL
HEMOGLOBIN: NORMAL
LDL CHOLESTEROL CALCULATED: NORMAL
LYMPHOCYTES ABSOLUTE: NORMAL
LYMPHOCYTES RELATIVE PERCENT: NORMAL
MCH RBC QN AUTO: NORMAL PG
MCHC RBC AUTO-ENTMCNC: NORMAL G/DL
MCV RBC AUTO: NORMAL FL
MONOCYTES ABSOLUTE: NORMAL
MONOCYTES RELATIVE PERCENT: NORMAL
NEUTROPHILS ABSOLUTE: NORMAL
NEUTROPHILS RELATIVE PERCENT: NORMAL
NONHDLC SERPL-MCNC: NORMAL MG/DL
PDW BLD-RTO: NORMAL %
PLATELET # BLD: NORMAL 10*3/UL
PMV BLD AUTO: NORMAL FL
POTASSIUM SERPL-SCNC: NORMAL MMOL/L
RBC # BLD: NORMAL 10*6/UL
SODIUM BLD-SCNC: NORMAL MMOL/L
TOTAL PROTEIN: NORMAL
TRIGL SERPL-MCNC: NORMAL MG/DL
TSH SERPL DL<=0.05 MIU/L-ACNC: NORMAL M[IU]/L
VITAMIN B-12: NORMAL
VITAMIN D 25-HYDROXY: NORMAL
VITAMIN D2, 25 HYDROXY: NORMAL
VITAMIN D3,25 HYDROXY: NORMAL
VLDLC SERPL CALC-MCNC: NORMAL MG/DL
WBC # BLD: NORMAL 10*3/UL

## 2023-07-12 ENCOUNTER — OFFICE VISIT (OUTPATIENT)
Dept: PRIMARY CARE CLINIC | Age: 63
End: 2023-07-12
Payer: COMMERCIAL

## 2023-07-12 VITALS
TEMPERATURE: 98 F | DIASTOLIC BLOOD PRESSURE: 70 MMHG | SYSTOLIC BLOOD PRESSURE: 118 MMHG | HEART RATE: 84 BPM | BODY MASS INDEX: 31.76 KG/M2 | WEIGHT: 185 LBS | OXYGEN SATURATION: 94 %

## 2023-07-12 DIAGNOSIS — I10 ESSENTIAL HYPERTENSION: ICD-10-CM

## 2023-07-12 DIAGNOSIS — J44.9 CHRONIC OBSTRUCTIVE PULMONARY DISEASE, UNSPECIFIED COPD TYPE (HCC): ICD-10-CM

## 2023-07-12 DIAGNOSIS — I65.23 CAROTID STENOSIS, ASYMPTOMATIC, BILATERAL: ICD-10-CM

## 2023-07-12 DIAGNOSIS — R53.83 FATIGUE, UNSPECIFIED TYPE: ICD-10-CM

## 2023-07-12 DIAGNOSIS — I73.9 PVD (PERIPHERAL VASCULAR DISEASE) (HCC): ICD-10-CM

## 2023-07-12 DIAGNOSIS — R73.01 IFG (IMPAIRED FASTING GLUCOSE): ICD-10-CM

## 2023-07-12 DIAGNOSIS — E78.2 MIXED HYPERLIPIDEMIA: ICD-10-CM

## 2023-07-12 DIAGNOSIS — R10.11 RUQ ABDOMINAL PAIN: Primary | ICD-10-CM

## 2023-07-12 PROCEDURE — 3078F DIAST BP <80 MM HG: CPT | Performed by: NURSE PRACTITIONER

## 2023-07-12 PROCEDURE — 3074F SYST BP LT 130 MM HG: CPT | Performed by: NURSE PRACTITIONER

## 2023-07-12 PROCEDURE — 99214 OFFICE O/P EST MOD 30 MIN: CPT | Performed by: NURSE PRACTITIONER

## 2023-07-12 ASSESSMENT — ENCOUNTER SYMPTOMS
SHORTNESS OF BREATH: 0
COUGH: 0
WHEEZING: 0
SORE THROAT: 0
EYE ITCHING: 0
BLOOD IN STOOL: 0
ABDOMINAL PAIN: 1
COLOR CHANGE: 0
BACK PAIN: 1
SINUS PRESSURE: 0
SINUS PAIN: 0
EYE PAIN: 0
FACIAL SWELLING: 0
CONSTIPATION: 0
DIARRHEA: 0
TROUBLE SWALLOWING: 0
NAUSEA: 0
CHOKING: 0
VOICE CHANGE: 0
EYE REDNESS: 0
ABDOMINAL DISTENTION: 0
RHINORRHEA: 0
EYE DISCHARGE: 0
CHEST TIGHTNESS: 0
VOMITING: 0
PHOTOPHOBIA: 0

## 2023-07-12 NOTE — PROGRESS NOTES
Rhythm: Normal rate. Rhythm regular. Pulses: Normal pulses. Heart sounds: Normal heart sounds, S1 normal and S2 normal. No murmur heard. No friction rub. No gallop. Pulmonary:      Effort: Pulmonary effort is normal. No tachypnea, accessory muscle usage or respiratory distress. Breath sounds: Normal breath sounds and air entry. No wheezing, rales, rhonchi or stridor. Chest:      Chest wall: No tenderness. Abdominal:      General: Bowel sounds are normal. There is no distension. Palpations: Abdomen is soft. There is no mass. Tenderness: There is no abdominal tenderness. There is no right CVA tenderness, left CVA tenderness, guarding or rebound. Hernia: No hernia is present. Musculoskeletal:         General: No swelling, tenderness, deformity or signs of injury. Normal range of motion. Cervical back: Full passive range of motion without pain, normal range of motion and neck supple. No rigidity. No muscular tenderness. Right lower leg: No edema. Left lower leg: No edema. Lymphadenopathy:      Cervical: No cervical adenopathy. Skin:     General: Skin is warm and dry. Capillary Refill: Capillary refill takes less than 2 seconds. Coloration: Skin is not jaundiced or pale. Findings: No bruising, erythema, lesion or rash. Neurological:      General: No focal deficit present. Mental Status: She is alert and oriented to person, place, and time. Mental status is at baseline. Cranial Nerves: Cranial nerves are intact. No cranial nerve deficit. Sensory: Sensation is intact. No sensory deficit. Motor: Motor function is intact. No weakness. Coordination: Coordination is intact. Coordination normal.      Gait: Gait is intact.  Gait normal.      Deep Tendon Reflexes: Reflexes normal.   Psychiatric:         Attention and Perception: Attention and perception normal.         Mood and Affect: Mood and affect normal.         Speech: Speech

## 2023-07-20 DIAGNOSIS — R10.11 RUQ ABDOMINAL PAIN: ICD-10-CM

## 2023-08-24 RX ORDER — AZITHROMYCIN 250 MG/1
250 TABLET, FILM COATED ORAL SEE ADMIN INSTRUCTIONS
Qty: 6 TABLET | Refills: 0 | Status: SHIPPED | OUTPATIENT
Start: 2023-08-24 | End: 2023-08-29

## 2023-09-12 ENCOUNTER — HOSPITAL ENCOUNTER (OUTPATIENT)
Dept: GENERAL RADIOLOGY | Age: 63
Discharge: HOME | End: 2023-09-14
Payer: COMMERCIAL

## 2023-09-12 VITALS — WEIGHT: 185 LBS | BODY MASS INDEX: 31.58 KG/M2 | HEIGHT: 64 IN

## 2023-09-12 DIAGNOSIS — N63.20 MASS OF LEFT BREAST ON MAMMOGRAM: ICD-10-CM

## 2023-09-12 PROCEDURE — 76642 ULTRASOUND BREAST LIMITED: CPT

## 2023-09-12 PROCEDURE — 77065 DX MAMMO INCL CAD UNI: CPT

## 2023-09-12 PROCEDURE — G0279 TOMOSYNTHESIS, MAMMO: HCPCS

## 2023-09-23 DIAGNOSIS — E78.2 MIXED HYPERLIPIDEMIA: ICD-10-CM

## 2023-09-25 RX ORDER — ROSUVASTATIN CALCIUM 20 MG/1
20 TABLET, COATED ORAL DAILY
Qty: 90 TABLET | Refills: 1 | Status: SHIPPED | OUTPATIENT
Start: 2023-09-25

## 2023-09-27 DIAGNOSIS — I10 ESSENTIAL HYPERTENSION: ICD-10-CM

## 2023-09-27 RX ORDER — LOSARTAN POTASSIUM AND HYDROCHLOROTHIAZIDE 12.5; 1 MG/1; MG/1
1 TABLET ORAL DAILY
Qty: 90 TABLET | Refills: 1 | Status: SHIPPED | OUTPATIENT
Start: 2023-09-27

## 2023-10-13 ENCOUNTER — OFFICE VISIT (OUTPATIENT)
Dept: PRIMARY CARE CLINIC | Age: 63
End: 2023-10-13

## 2023-10-13 VITALS
OXYGEN SATURATION: 96 % | HEIGHT: 64 IN | BODY MASS INDEX: 31.82 KG/M2 | DIASTOLIC BLOOD PRESSURE: 92 MMHG | WEIGHT: 186.4 LBS | SYSTOLIC BLOOD PRESSURE: 138 MMHG | TEMPERATURE: 97.1 F | HEART RATE: 90 BPM

## 2023-10-13 DIAGNOSIS — I65.23 CAROTID STENOSIS, ASYMPTOMATIC, BILATERAL: Primary | ICD-10-CM

## 2023-10-13 DIAGNOSIS — Z48.02 VISIT FOR SUTURE REMOVAL: Primary | ICD-10-CM

## 2023-10-13 ASSESSMENT — ENCOUNTER SYMPTOMS
NAUSEA: 0
SHORTNESS OF BREATH: 0
COUGH: 0
EYE DISCHARGE: 0
SINUS PRESSURE: 0
EYE REDNESS: 0
ABDOMINAL DISTENTION: 0
BACK PAIN: 0
EYE PAIN: 0
SORE THROAT: 0
DIARRHEA: 0
VOMITING: 0
WHEEZING: 0

## 2023-10-24 ENCOUNTER — HOSPITAL ENCOUNTER (OUTPATIENT)
Dept: CARDIOLOGY | Age: 63
Discharge: HOME | End: 2023-10-26
Payer: COMMERCIAL

## 2023-10-24 ENCOUNTER — OFFICE VISIT (OUTPATIENT)
Dept: VASCULAR SURGERY | Age: 63
End: 2023-10-24
Payer: COMMERCIAL

## 2023-10-24 VITALS — WEIGHT: 181 LBS | BODY MASS INDEX: 31.07 KG/M2

## 2023-10-24 DIAGNOSIS — I65.23 CAROTID STENOSIS, ASYMPTOMATIC, BILATERAL: Primary | ICD-10-CM

## 2023-10-24 DIAGNOSIS — I65.23 CAROTID STENOSIS, ASYMPTOMATIC, BILATERAL: ICD-10-CM

## 2023-10-24 PROCEDURE — 93880 EXTRACRANIAL BILAT STUDY: CPT | Performed by: SURGERY

## 2023-10-24 PROCEDURE — 93880 EXTRACRANIAL BILAT STUDY: CPT

## 2023-10-24 PROCEDURE — 99213 OFFICE O/P EST LOW 20 MIN: CPT | Performed by: SURGERY

## 2023-10-24 NOTE — PROGRESS NOTES
[facility] Heart & Vascular Lab - [facility]    This is a pre read worksheet - prior to official physician interpretation    Radha Ribeiro  1960  Date of study: 10/24/23    Indication for study:  Carotid artery stenosis  Study : Bilateral Carotid Artery Duplex Examination    Duplex examination of the RIGHT carotid artery system identifies atherosclerotic plaque. The peak systolic velocity in internal carotid artery was 182 centimeters / second. The maximum end diastolic velocity was 56 centimeters / second. The ICA/CCA ratio is 2.0. The right vertebral artery has antegrade flow. Duplex examination of the LEFT carotid artery system identifies atherosclerotic plaque. The peak systolic velocity in internal carotid artery was 227 centimeters / second. The maximum end diastolic velocity was 74 centimeters / second. The ICA/CCA ratio is 2.3. The left vertebral artery has antegrade flow.     RIGHT  psv  LEFT  psv        LAST STUDY  10/18/2022  Rt wnl  Lt 50-69

## 2023-10-24 NOTE — PROGRESS NOTES
Vascular Surgery Outpatient Progress Note      Chief Complaint   Patient presents with    Circulatory Problem     Bilateral carotid artery stenosis       HISTORY OF PRESENT ILLNESS:                The patient is a 57 y.o. female who returns for follow-up evaluation of carotid artery disease and history of right endarterectomy. She is accompanied by her . She denies any new problems since I saw her last year. She specifically denies any recent surgeries or hospitalizations. Past Medical History:        Diagnosis Date    Allergic rhinitis     Carotid artery stenosis     Hypertension     Vitamin B12 deficiency      Past Surgical History:        Procedure Laterality Date    CAROTID ENDARTERECTOMY Right 3/31/2022    RIGHT CAROTID ENDARTERECTOMY performed by Lalita Keith MD at [id number] BIOPSY  5/8/2018    US LYMPH NODE BIOPSY     Current Medications:   Prior to Admission medications    Medication Sig Start Date End Date Taking?  Authorizing Provider   losartan-hydroCHLOROthiazide (HYZAAR) 100-12.5 MG per tablet TAKE 1 TABLET DAILY 9/27/23  Yes SALMA Chamberlain NP   rosuvastatin (CRESTOR) 20 MG tablet TAKE 1 TABLET DAILY 9/25/23  Yes SALMA Chamberlain NP   tiotropium-olodaterol ([address]) 2.5-2.5 MCG/ACT AERS 2 puffs daily 5/1/23  Yes SALMA Chamberlain NP   albuterol sulfate HFA (PROVENTIL;VENTOLIN;PROAIR) 108 (90 Base) MCG/ACT inhaler inhale 2 puffs by mouth INTO THE LUNGS SIX TIMES PER DAY if needed 2/16/23  Yes SALMA Peterson NP   ipratropium-albuterol (DUONEB) 0.5-2.5 (3) MG/3ML SOLN nebulizer solution Inhale 3 mLs into the lungs every 4 hours 2/16/23  Yes SALMA Peterson NP   Multiple Vitamin (MULTI VITAMIN DAILY PO) Take 1 tablet by mouth daily    Yes Karlos Hinson MD   aspirin 81 MG tablet Take 1 tablet by mouth daily   Yes Karlos Hinson MD   budesonide-formoterol

## 2023-10-30 ENCOUNTER — OFFICE VISIT (OUTPATIENT)
Dept: ENT CLINIC | Age: 63
End: 2023-10-30
Payer: COMMERCIAL

## 2023-10-30 VITALS — BODY MASS INDEX: 30.9 KG/M2 | HEIGHT: 64 IN | WEIGHT: 181 LBS

## 2023-10-30 DIAGNOSIS — K21.9 LPRD (LARYNGOPHARYNGEAL REFLUX DISEASE): Primary | ICD-10-CM

## 2023-10-30 PROCEDURE — 99213 OFFICE O/P EST LOW 20 MIN: CPT | Performed by: OTOLARYNGOLOGY

## 2023-10-30 ASSESSMENT — ENCOUNTER SYMPTOMS
SORE THROAT: 0
VOICE CHANGE: 0
SINUS PRESSURE: 0
RHINORRHEA: 1
TROUBLE SWALLOWING: 0
VOMITING: 0
COUGH: 0
SHORTNESS OF BREATH: 0

## 2023-10-30 NOTE — PATIENT INSTRUCTIONS
[employer] aerosol nasal saline mist 2 sprays each nostril 2-3 times daily    Can also use AYR saline gel nightly to keep  nose moisturized.

## 2023-11-09 DIAGNOSIS — J44.9 CHRONIC OBSTRUCTIVE PULMONARY DISEASE, UNSPECIFIED COPD TYPE (HCC): ICD-10-CM

## 2024-01-02 ENCOUNTER — OFFICE VISIT (OUTPATIENT)
Dept: PRIMARY CARE CLINIC | Age: 64
End: 2024-01-02
Payer: MEDICARE

## 2024-01-02 VITALS
HEIGHT: 64 IN | RESPIRATION RATE: 18 BRPM | TEMPERATURE: 98 F | SYSTOLIC BLOOD PRESSURE: 138 MMHG | WEIGHT: 187.6 LBS | HEART RATE: 78 BPM | OXYGEN SATURATION: 97 % | DIASTOLIC BLOOD PRESSURE: 78 MMHG | BODY MASS INDEX: 32.03 KG/M2

## 2024-01-02 DIAGNOSIS — J10.1 INFLUENZA A: Primary | ICD-10-CM

## 2024-01-02 DIAGNOSIS — R05.9 COUGH, UNSPECIFIED TYPE: ICD-10-CM

## 2024-01-02 DIAGNOSIS — J40 BRONCHITIS: ICD-10-CM

## 2024-01-02 DIAGNOSIS — J01.00 ACUTE NON-RECURRENT MAXILLARY SINUSITIS: ICD-10-CM

## 2024-01-02 DIAGNOSIS — J02.9 SORE THROAT: ICD-10-CM

## 2024-01-02 LAB
INFLUENZA A ANTIGEN, POC: POSITIVE
INFLUENZA B ANTIGEN, POC: NEGATIVE
Lab: NORMAL
PERFORMING INSTRUMENT: NORMAL
QC PASS/FAIL: NORMAL
S PYO AG THROAT QL: NORMAL
SARS-COV-2, POC: NORMAL

## 2024-01-02 PROCEDURE — G8417 CALC BMI ABV UP PARAM F/U: HCPCS | Performed by: NURSE PRACTITIONER

## 2024-01-02 PROCEDURE — G8427 DOCREV CUR MEDS BY ELIG CLIN: HCPCS | Performed by: NURSE PRACTITIONER

## 2024-01-02 PROCEDURE — G8400 PT W/DXA NO RESULTS DOC: HCPCS | Performed by: NURSE PRACTITIONER

## 2024-01-02 PROCEDURE — 99213 OFFICE O/P EST LOW 20 MIN: CPT | Performed by: NURSE PRACTITIONER

## 2024-01-02 PROCEDURE — 3075F SYST BP GE 130 - 139MM HG: CPT | Performed by: NURSE PRACTITIONER

## 2024-01-02 PROCEDURE — 87804 INFLUENZA ASSAY W/OPTIC: CPT | Performed by: NURSE PRACTITIONER

## 2024-01-02 PROCEDURE — 1090F PRES/ABSN URINE INCON ASSESS: CPT | Performed by: NURSE PRACTITIONER

## 2024-01-02 PROCEDURE — 1123F ACP DISCUSS/DSCN MKR DOCD: CPT | Performed by: NURSE PRACTITIONER

## 2024-01-02 PROCEDURE — 87426 SARSCOV CORONAVIRUS AG IA: CPT | Performed by: NURSE PRACTITIONER

## 2024-01-02 PROCEDURE — G8484 FLU IMMUNIZE NO ADMIN: HCPCS | Performed by: NURSE PRACTITIONER

## 2024-01-02 PROCEDURE — 87880 STREP A ASSAY W/OPTIC: CPT | Performed by: NURSE PRACTITIONER

## 2024-01-02 PROCEDURE — 3078F DIAST BP <80 MM HG: CPT | Performed by: NURSE PRACTITIONER

## 2024-01-02 PROCEDURE — G9899 SCRN MAM PERF RSLTS DOC: HCPCS | Performed by: NURSE PRACTITIONER

## 2024-01-02 PROCEDURE — 3017F COLORECTAL CA SCREEN DOC REV: CPT | Performed by: NURSE PRACTITIONER

## 2024-01-02 PROCEDURE — 1036F TOBACCO NON-USER: CPT | Performed by: NURSE PRACTITIONER

## 2024-01-02 RX ORDER — OSELTAMIVIR PHOSPHATE 75 MG/1
75 CAPSULE ORAL 2 TIMES DAILY
Qty: 10 CAPSULE | Refills: 0 | Status: SHIPPED | OUTPATIENT
Start: 2024-01-02 | End: 2024-01-07

## 2024-01-02 RX ORDER — CETIRIZINE HYDROCHLORIDE 10 MG/1
10 TABLET ORAL DAILY
Qty: 30 TABLET | Refills: 0 | Status: SHIPPED | OUTPATIENT
Start: 2024-01-02 | End: 2024-02-01

## 2024-01-02 RX ORDER — BENZONATATE 200 MG/1
200 CAPSULE ORAL 3 TIMES DAILY PRN
Qty: 30 CAPSULE | Refills: 0 | Status: SHIPPED | OUTPATIENT
Start: 2024-01-02 | End: 2024-01-09

## 2024-01-02 ASSESSMENT — PATIENT HEALTH QUESTIONNAIRE - PHQ9
SUM OF ALL RESPONSES TO PHQ QUESTIONS 1-9: 0
1. LITTLE INTEREST OR PLEASURE IN DOING THINGS: 0
SUM OF ALL RESPONSES TO PHQ9 QUESTIONS 1 & 2: 0
SUM OF ALL RESPONSES TO PHQ QUESTIONS 1-9: 0
2. FEELING DOWN, DEPRESSED OR HOPELESS: 0
SUM OF ALL RESPONSES TO PHQ QUESTIONS 1-9: 0
SUM OF ALL RESPONSES TO PHQ QUESTIONS 1-9: 0

## 2024-01-02 NOTE — PROGRESS NOTES
Chief Complaint:   Cough (Started 12/25/23 sore throat started still sore and cough also having acid reflux with this as well ), Drainage, and Sore Throat      History of Present Illness   Source of history provided by:  patient.     Gela Naranjo is a 63 y.o. old female who presents to primary care with a cough for the past 7 days.  States the cough is  productive with yellow sputum.  Denies subjective fever noted.  States they have a sore throat, mild HA, ear discomfort, and therapy at home has not been successful in alleviating symptoms.  Denies any N/V/D, abdominal pain, CP, progressive SOB, dizziness, or lethargy.       Review of Systems    Unless otherwise stated in this report or unable to obtain because of the patient's clinical or mental status as evidenced by the medical record, this patients's positive and negative responses for Review of Systems, constitutional, psych, eyes, ENT, cardiovascular, respiratory, gastrointestinal, neurological, genitourinary, musculoskeletal, integument systems and systems related to the presenting problem are either stated in the preceding or were not pertinent or were negative for the symptoms and/or complaints related to the medical problem.    Past Medical History:  has a past medical history of Allergic rhinitis, Carotid artery stenosis, Hypertension, and Vitamin B12 deficiency.   Past Surgical History:  has a past surgical history that includes Mouth surgery; Lumbar disc surgery; US BIOPSY LYMPH NODE (5/8/2018); and Carotid endarterectomy (Right, 3/31/2022).  Social History:  reports that she quit smoking about 24 years ago. Her smoking use included cigarettes. She started smoking about 49 years ago. She has a 25.0 pack-year smoking history. She has never used smokeless tobacco. She reports current alcohol use. She reports that she does not currently use drugs.  Family History: family history includes Diabetes in her father and sister; High Blood Pressure in her

## 2024-01-11 LAB
ALBUMIN SERPL-MCNC: NORMAL G/DL
ALP BLD-CCNC: NORMAL U/L
ALT SERPL-CCNC: NORMAL U/L
ANION GAP SERPL CALCULATED.3IONS-SCNC: NORMAL MMOL/L
AST SERPL-CCNC: NORMAL U/L
AVERAGE GLUCOSE: NORMAL
BASOPHILS ABSOLUTE: NORMAL
BASOPHILS RELATIVE PERCENT: NORMAL
BILIRUB SERPL-MCNC: NORMAL MG/DL
BUN BLDV-MCNC: NORMAL MG/DL
CALCIUM SERPL-MCNC: NORMAL MG/DL
CHLORIDE BLD-SCNC: NORMAL MMOL/L
CHOLESTEROL, TOTAL: NORMAL
CHOLESTEROL/HDL RATIO: NORMAL
CO2: NORMAL
CREAT SERPL-MCNC: NORMAL MG/DL
EGFR: NORMAL
EOSINOPHILS ABSOLUTE: NORMAL
EOSINOPHILS RELATIVE PERCENT: NORMAL
GLUCOSE BLD-MCNC: NORMAL MG/DL
HBA1C MFR BLD: 7.7 %
HCT VFR BLD CALC: NORMAL %
HDLC SERPL-MCNC: NORMAL MG/DL
HEMOGLOBIN: NORMAL
LDL CHOLESTEROL CALCULATED: NORMAL
LYMPHOCYTES ABSOLUTE: NORMAL
LYMPHOCYTES RELATIVE PERCENT: NORMAL
MCH RBC QN AUTO: NORMAL PG
MCHC RBC AUTO-ENTMCNC: NORMAL G/DL
MCV RBC AUTO: NORMAL FL
MONOCYTES ABSOLUTE: NORMAL
MONOCYTES RELATIVE PERCENT: NORMAL
NEUTROPHILS ABSOLUTE: NORMAL
NEUTROPHILS RELATIVE PERCENT: NORMAL
NONHDLC SERPL-MCNC: NORMAL MG/DL
PDW BLD-RTO: NORMAL %
PLATELET # BLD: NORMAL 10*3/UL
PMV BLD AUTO: NORMAL FL
POTASSIUM SERPL-SCNC: NORMAL MMOL/L
RBC # BLD: NORMAL 10*6/UL
SODIUM BLD-SCNC: NORMAL MMOL/L
TOTAL PROTEIN: NORMAL
TRIGL SERPL-MCNC: NORMAL MG/DL
TSH SERPL DL<=0.05 MIU/L-ACNC: NORMAL M[IU]/L
VITAMIN B-12: NORMAL
VITAMIN D 25-HYDROXY: NORMAL
VITAMIN D2, 25 HYDROXY: NORMAL
VITAMIN D3,25 HYDROXY: NORMAL
VLDLC SERPL CALC-MCNC: NORMAL MG/DL
WBC # BLD: NORMAL 10*3/UL

## 2024-01-12 ENCOUNTER — OFFICE VISIT (OUTPATIENT)
Dept: PRIMARY CARE CLINIC | Age: 64
End: 2024-01-12
Payer: MEDICARE

## 2024-01-12 VITALS
WEIGHT: 188.2 LBS | BODY MASS INDEX: 32.13 KG/M2 | TEMPERATURE: 97.3 F | HEART RATE: 77 BPM | DIASTOLIC BLOOD PRESSURE: 80 MMHG | SYSTOLIC BLOOD PRESSURE: 132 MMHG | OXYGEN SATURATION: 96 % | HEIGHT: 64 IN

## 2024-01-12 DIAGNOSIS — E78.2 MIXED HYPERLIPIDEMIA: ICD-10-CM

## 2024-01-12 DIAGNOSIS — I10 ESSENTIAL HYPERTENSION: ICD-10-CM

## 2024-01-12 DIAGNOSIS — I73.9 PVD (PERIPHERAL VASCULAR DISEASE) (HCC): ICD-10-CM

## 2024-01-12 DIAGNOSIS — R73.01 IFG (IMPAIRED FASTING GLUCOSE): ICD-10-CM

## 2024-01-12 DIAGNOSIS — J44.9 CHRONIC OBSTRUCTIVE PULMONARY DISEASE, UNSPECIFIED COPD TYPE (HCC): ICD-10-CM

## 2024-01-12 DIAGNOSIS — E11.9 TYPE 2 DIABETES MELLITUS WITHOUT COMPLICATION, WITHOUT LONG-TERM CURRENT USE OF INSULIN (HCC): ICD-10-CM

## 2024-01-12 DIAGNOSIS — R05.9 COUGH, UNSPECIFIED TYPE: Primary | ICD-10-CM

## 2024-01-12 DIAGNOSIS — R53.83 FATIGUE, UNSPECIFIED TYPE: ICD-10-CM

## 2024-01-12 PROCEDURE — 3017F COLORECTAL CA SCREEN DOC REV: CPT | Performed by: NURSE PRACTITIONER

## 2024-01-12 PROCEDURE — G8400 PT W/DXA NO RESULTS DOC: HCPCS | Performed by: NURSE PRACTITIONER

## 2024-01-12 PROCEDURE — G8417 CALC BMI ABV UP PARAM F/U: HCPCS | Performed by: NURSE PRACTITIONER

## 2024-01-12 PROCEDURE — G8427 DOCREV CUR MEDS BY ELIG CLIN: HCPCS | Performed by: NURSE PRACTITIONER

## 2024-01-12 PROCEDURE — G9899 SCRN MAM PERF RSLTS DOC: HCPCS | Performed by: NURSE PRACTITIONER

## 2024-01-12 PROCEDURE — 3046F HEMOGLOBIN A1C LEVEL >9.0%: CPT | Performed by: NURSE PRACTITIONER

## 2024-01-12 PROCEDURE — 1036F TOBACCO NON-USER: CPT | Performed by: NURSE PRACTITIONER

## 2024-01-12 PROCEDURE — 99214 OFFICE O/P EST MOD 30 MIN: CPT | Performed by: NURSE PRACTITIONER

## 2024-01-12 PROCEDURE — 3023F SPIROM DOC REV: CPT | Performed by: NURSE PRACTITIONER

## 2024-01-12 PROCEDURE — 1123F ACP DISCUSS/DSCN MKR DOCD: CPT | Performed by: NURSE PRACTITIONER

## 2024-01-12 PROCEDURE — 3075F SYST BP GE 130 - 139MM HG: CPT | Performed by: NURSE PRACTITIONER

## 2024-01-12 PROCEDURE — 3079F DIAST BP 80-89 MM HG: CPT | Performed by: NURSE PRACTITIONER

## 2024-01-12 PROCEDURE — G8484 FLU IMMUNIZE NO ADMIN: HCPCS | Performed by: NURSE PRACTITIONER

## 2024-01-12 PROCEDURE — 2022F DILAT RTA XM EVC RTNOPTHY: CPT | Performed by: NURSE PRACTITIONER

## 2024-01-12 PROCEDURE — 1090F PRES/ABSN URINE INCON ASSESS: CPT | Performed by: NURSE PRACTITIONER

## 2024-01-12 ASSESSMENT — ENCOUNTER SYMPTOMS
EYE PAIN: 0
SHORTNESS OF BREATH: 1
CONSTIPATION: 0
CHOKING: 0
BLOOD IN STOOL: 0
NAUSEA: 0
VOMITING: 0
BACK PAIN: 0
COLOR CHANGE: 0
TROUBLE SWALLOWING: 0
SINUS PRESSURE: 0
RHINORRHEA: 0
FACIAL SWELLING: 0
VOICE CHANGE: 0
DIARRHEA: 0
ABDOMINAL PAIN: 0
SINUS PAIN: 0
PHOTOPHOBIA: 0
WHEEZING: 0
EYE REDNESS: 0
SORE THROAT: 0
COUGH: 1
ABDOMINAL DISTENTION: 0
CHEST TIGHTNESS: 0
EYE DISCHARGE: 0
EYE ITCHING: 0

## 2024-01-12 NOTE — PROGRESS NOTES
blood in stool, constipation, diarrhea, nausea and vomiting.   Endocrine: Negative for cold intolerance, heat intolerance, polydipsia, polyphagia and polyuria.   Genitourinary:  Negative for difficulty urinating, dysuria, flank pain, frequency, hematuria and urgency.   Musculoskeletal:  Positive for arthralgias (Hips and hands.  She reports that it is related to the weather). Negative for back pain, gait problem, joint swelling, myalgias, neck pain and neck stiffness.   Skin:  Negative for color change, rash and wound.   Allergic/Immunologic: Negative for environmental allergies and food allergies.   Neurological:  Positive for light-headedness (\"Sometimes\"  \"No particular time\"). Negative for dizziness, tremors, seizures, syncope, facial asymmetry, speech difficulty, weakness, numbness and headaches.   Hematological:  Does not bruise/bleed easily.   Psychiatric/Behavioral:  Negative for agitation, behavioral problems, confusion, decreased concentration, dysphoric mood, hallucinations, self-injury, sleep disturbance and suicidal ideas. The patient is not nervous/anxious.          Current Outpatient Medications:     tiotropium-olodaterol (STIOLTO RESPIMAT) 2.5-2.5 MCG/ACT AERS, 2 puffs daily, Disp: 1 each, Rfl: 5    losartan-hydroCHLOROthiazide (HYZAAR) 100-12.5 MG per tablet, TAKE 1 TABLET DAILY, Disp: 90 tablet, Rfl: 1    rosuvastatin (CRESTOR) 20 MG tablet, TAKE 1 TABLET DAILY, Disp: 90 tablet, Rfl: 1    albuterol sulfate HFA (PROVENTIL;VENTOLIN;PROAIR) 108 (90 Base) MCG/ACT inhaler, inhale 2 puffs by mouth INTO THE LUNGS SIX TIMES PER DAY if needed, Disp: 1 each, Rfl: 2    ipratropium-albuterol (DUONEB) 0.5-2.5 (3) MG/3ML SOLN nebulizer solution, Inhale 3 mLs into the lungs every 4 hours, Disp: 360 mL, Rfl: 2    Multiple Vitamin (MULTI VITAMIN DAILY PO), Take 1 tablet by mouth daily , Disp: , Rfl:     aspirin 81 MG tablet, Take 1 tablet by mouth daily, Disp: , Rfl:   No Known Allergies    Past Medical History:

## 2024-01-19 ENCOUNTER — HOSPITAL ENCOUNTER (OUTPATIENT)
Dept: GENERAL RADIOLOGY | Age: 64
End: 2024-01-19
Payer: MEDICARE

## 2024-01-19 VITALS — WEIGHT: 180 LBS | BODY MASS INDEX: 30.9 KG/M2

## 2024-01-19 DIAGNOSIS — R92.8 ABNORMAL MAMMOGRAM: ICD-10-CM

## 2024-01-19 PROCEDURE — G0279 TOMOSYNTHESIS, MAMMO: HCPCS

## 2024-03-28 ENCOUNTER — TELEPHONE (OUTPATIENT)
Dept: PRIMARY CARE CLINIC | Age: 64
End: 2024-03-28

## 2024-03-28 RX ORDER — FLUTICASONE FUROATE, UMECLIDINIUM BROMIDE AND VILANTEROL TRIFENATATE 100; 62.5; 25 UG/1; UG/1; UG/1
1 POWDER RESPIRATORY (INHALATION) DAILY
COMMUNITY
End: 2024-03-28 | Stop reason: SDUPTHER

## 2024-03-28 NOTE — TELEPHONE ENCOUNTER
Patient called in stating almost out of Stiolto, wanting to know if we have samples, her new insurance will not cover it.    We do not have samples, but do have Breztri and Trelegy if either of those would work.      Please advise

## 2024-03-29 ENCOUNTER — NURSE ONLY (OUTPATIENT)
Dept: PRIMARY CARE CLINIC | Age: 64
End: 2024-03-29
Payer: MEDICARE

## 2024-03-29 DIAGNOSIS — E78.5 HYPERLIPIDEMIA, UNSPECIFIED HYPERLIPIDEMIA TYPE: Primary | ICD-10-CM

## 2024-03-29 DIAGNOSIS — E11.9 TYPE 2 DIABETES MELLITUS WITHOUT COMPLICATION, WITHOUT LONG-TERM CURRENT USE OF INSULIN (HCC): ICD-10-CM

## 2024-03-29 DIAGNOSIS — I73.9 PVD (PERIPHERAL VASCULAR DISEASE) (HCC): ICD-10-CM

## 2024-03-29 DIAGNOSIS — R73.01 IFG (IMPAIRED FASTING GLUCOSE): ICD-10-CM

## 2024-03-29 DIAGNOSIS — I10 ESSENTIAL HYPERTENSION: ICD-10-CM

## 2024-03-29 LAB
ALBUMIN SERPL-MCNC: 4.5 G/DL (ref 3.5–5.2)
ALP BLD-CCNC: 78 U/L (ref 35–104)
ALT SERPL-CCNC: 35 U/L (ref 0–32)
ANION GAP SERPL CALCULATED.3IONS-SCNC: 10 MMOL/L (ref 7–16)
AST SERPL-CCNC: 26 U/L (ref 0–31)
BILIRUB SERPL-MCNC: 0.4 MG/DL (ref 0–1.2)
BUN BLDV-MCNC: 10 MG/DL (ref 6–23)
CALCIUM SERPL-MCNC: 9.8 MG/DL (ref 8.6–10.2)
CHLORIDE BLD-SCNC: 99 MMOL/L (ref 98–107)
CO2: 30 MMOL/L (ref 22–29)
CREAT SERPL-MCNC: 0.4 MG/DL (ref 0.5–1)
GFR SERPL CREATININE-BSD FRML MDRD: >90 ML/MIN/1.73M2
GLUCOSE BLD-MCNC: 118 MG/DL (ref 74–99)
POTASSIUM SERPL-SCNC: 4.4 MMOL/L (ref 3.5–5)
SODIUM BLD-SCNC: 139 MMOL/L (ref 132–146)
TOTAL PROTEIN: 8.1 G/DL (ref 6.4–8.3)

## 2024-03-29 PROCEDURE — 36415 COLL VENOUS BLD VENIPUNCTURE: CPT | Performed by: NURSE PRACTITIONER

## 2024-03-29 RX ORDER — FLUTICASONE FUROATE, UMECLIDINIUM BROMIDE AND VILANTEROL TRIFENATATE 100; 62.5; 25 UG/1; UG/1; UG/1
1 POWDER RESPIRATORY (INHALATION) DAILY
Qty: 2 EACH | Refills: 0 | Status: SHIPPED | COMMUNITY
Start: 2024-03-29

## 2024-03-30 LAB — HBA1C MFR BLD: 6.4 % (ref 4–5.6)

## 2024-04-02 NOTE — FLOWSHEET NOTE
Ambulated with assistance on monitor with  RN,tolerated well. Vss. Writer attempted to complete caring contact for patient, but father Avery did not .  No voicemail available.  Will attempt again tomorrow.    KEL HuertaN, RN

## 2024-04-19 ENCOUNTER — OFFICE VISIT (OUTPATIENT)
Dept: PRIMARY CARE CLINIC | Age: 64
End: 2024-04-19
Payer: MEDICARE

## 2024-04-19 VITALS
OXYGEN SATURATION: 96 % | WEIGHT: 180 LBS | SYSTOLIC BLOOD PRESSURE: 128 MMHG | RESPIRATION RATE: 16 BRPM | BODY MASS INDEX: 30.73 KG/M2 | HEIGHT: 64 IN | TEMPERATURE: 97 F | HEART RATE: 72 BPM | DIASTOLIC BLOOD PRESSURE: 88 MMHG

## 2024-04-19 DIAGNOSIS — Z12.11 SCREENING FOR COLON CANCER: ICD-10-CM

## 2024-04-19 DIAGNOSIS — I10 ESSENTIAL HYPERTENSION: ICD-10-CM

## 2024-04-19 DIAGNOSIS — J44.9 CHRONIC OBSTRUCTIVE PULMONARY DISEASE, UNSPECIFIED COPD TYPE (HCC): Primary | ICD-10-CM

## 2024-04-19 DIAGNOSIS — E11.9 TYPE 2 DIABETES MELLITUS WITHOUT COMPLICATION, WITHOUT LONG-TERM CURRENT USE OF INSULIN (HCC): ICD-10-CM

## 2024-04-19 DIAGNOSIS — I73.9 PVD (PERIPHERAL VASCULAR DISEASE) (HCC): ICD-10-CM

## 2024-04-19 DIAGNOSIS — E78.2 MIXED HYPERLIPIDEMIA: ICD-10-CM

## 2024-04-19 DIAGNOSIS — R49.0 VOICE HOARSENESS: ICD-10-CM

## 2024-04-19 PROCEDURE — 3017F COLORECTAL CA SCREEN DOC REV: CPT | Performed by: NURSE PRACTITIONER

## 2024-04-19 PROCEDURE — 2022F DILAT RTA XM EVC RTNOPTHY: CPT | Performed by: NURSE PRACTITIONER

## 2024-04-19 PROCEDURE — 3074F SYST BP LT 130 MM HG: CPT | Performed by: NURSE PRACTITIONER

## 2024-04-19 PROCEDURE — G8417 CALC BMI ABV UP PARAM F/U: HCPCS | Performed by: NURSE PRACTITIONER

## 2024-04-19 PROCEDURE — 3023F SPIROM DOC REV: CPT | Performed by: NURSE PRACTITIONER

## 2024-04-19 PROCEDURE — G9899 SCRN MAM PERF RSLTS DOC: HCPCS | Performed by: NURSE PRACTITIONER

## 2024-04-19 PROCEDURE — 1090F PRES/ABSN URINE INCON ASSESS: CPT | Performed by: NURSE PRACTITIONER

## 2024-04-19 PROCEDURE — 3079F DIAST BP 80-89 MM HG: CPT | Performed by: NURSE PRACTITIONER

## 2024-04-19 PROCEDURE — 99214 OFFICE O/P EST MOD 30 MIN: CPT | Performed by: NURSE PRACTITIONER

## 2024-04-19 PROCEDURE — 1123F ACP DISCUSS/DSCN MKR DOCD: CPT | Performed by: NURSE PRACTITIONER

## 2024-04-19 PROCEDURE — G8427 DOCREV CUR MEDS BY ELIG CLIN: HCPCS | Performed by: NURSE PRACTITIONER

## 2024-04-19 PROCEDURE — 1036F TOBACCO NON-USER: CPT | Performed by: NURSE PRACTITIONER

## 2024-04-19 PROCEDURE — 3044F HG A1C LEVEL LT 7.0%: CPT | Performed by: NURSE PRACTITIONER

## 2024-04-19 PROCEDURE — G8400 PT W/DXA NO RESULTS DOC: HCPCS | Performed by: NURSE PRACTITIONER

## 2024-04-19 SDOH — ECONOMIC STABILITY: FOOD INSECURITY: WITHIN THE PAST 12 MONTHS, YOU WORRIED THAT YOUR FOOD WOULD RUN OUT BEFORE YOU GOT MONEY TO BUY MORE.: NEVER TRUE

## 2024-04-19 SDOH — ECONOMIC STABILITY: FOOD INSECURITY: WITHIN THE PAST 12 MONTHS, THE FOOD YOU BOUGHT JUST DIDN'T LAST AND YOU DIDN'T HAVE MONEY TO GET MORE.: NEVER TRUE

## 2024-04-19 SDOH — ECONOMIC STABILITY: INCOME INSECURITY: HOW HARD IS IT FOR YOU TO PAY FOR THE VERY BASICS LIKE FOOD, HOUSING, MEDICAL CARE, AND HEATING?: NOT HARD AT ALL

## 2024-04-19 ASSESSMENT — ENCOUNTER SYMPTOMS
CHOKING: 0
PHOTOPHOBIA: 0
BLOOD IN STOOL: 0
EYE ITCHING: 0
ABDOMINAL DISTENTION: 0
SORE THROAT: 0
VOICE CHANGE: 0
NAUSEA: 0
EYE PAIN: 0
CHEST TIGHTNESS: 0
SINUS PRESSURE: 0
SHORTNESS OF BREATH: 0
COLOR CHANGE: 0
TROUBLE SWALLOWING: 0
COUGH: 0
BACK PAIN: 1
EYE DISCHARGE: 0
CONSTIPATION: 0
RHINORRHEA: 0
FACIAL SWELLING: 0
SINUS PAIN: 0
WHEEZING: 0
DIARRHEA: 0
EYE REDNESS: 0
ABDOMINAL PAIN: 0
VOMITING: 0

## 2024-04-19 NOTE — PROGRESS NOTES
Educated patient due for colonoscopy and dexa scan-   Brenna Joseph LPN Float  
light.   Neck:      Thyroid: No thyromegaly.      Vascular: No carotid bruit.      Trachea: Trachea normal.   Cardiovascular:      Rate and Rhythm: Normal rate. Rhythm regular.      Pulses: Normal pulses.      Heart sounds: Normal heart sounds, S1 normal and S2 normal. No murmur heard.  No friction rub. No gallop.    Pulmonary:      Effort: Pulmonary effort is normal. No tachypnea, accessory muscle usage or respiratory distress.      Breath sounds: Normal breath sounds and air entry. No wheezing, rales, rhonchi or stridor.   Chest:      Chest wall: No tenderness.   Abdominal:      General: Bowel sounds are normal. There is no distension.      Palpations: Abdomen is soft. There is no mass.      Tenderness: There is no abdominal tenderness. There is no right CVA tenderness, left CVA tenderness, guarding or rebound.      Hernia: No hernia is present.   Musculoskeletal:         General: No swelling, tenderness, deformity or signs of injury. Normal range of motion.      Cervical back: Full passive range of motion without pain, normal range of motion and neck supple. No rigidity. No muscular tenderness.      Right lower leg: No edema.      Left lower leg: No edema.   Lymphadenopathy:      Cervical: No cervical adenopathy.   Skin:     General: Skin is warm and dry.      Capillary Refill: Capillary refill takes less than 2 seconds.      Coloration: Skin is not jaundiced or pale.      Findings: No bruising, erythema, lesion or rash.   Neurological:      General: No focal deficit present.      Mental Status: She is alert and oriented to person, place, and time. Mental status is at baseline.      Cranial Nerves: Cranial nerves are intact. No cranial nerve deficit.      Sensory: Sensation is intact. No sensory deficit.      Motor: Motor function is intact. No weakness.      Coordination: Coordination is intact. Coordination normal.      Gait: Gait is intact. Gait normal.      Deep Tendon Reflexes: Reflexes normal.

## 2024-05-11 LAB — NONINV COLON CA DNA+OCC BLD SCRN STL QL: POSITIVE

## 2024-05-13 DIAGNOSIS — R19.5 POSITIVE COLORECTAL CANCER SCREENING USING COLOGUARD TEST: Primary | ICD-10-CM

## 2024-06-10 RX ORDER — FLUTICASONE FUROATE, UMECLIDINIUM BROMIDE AND VILANTEROL TRIFENATATE 100; 62.5; 25 UG/1; UG/1; UG/1
1 POWDER RESPIRATORY (INHALATION) DAILY
Qty: 2 EACH | Refills: 0 | COMMUNITY
Start: 2024-06-10

## 2024-06-17 ENCOUNTER — TELEPHONE (OUTPATIENT)
Dept: PRIMARY CARE CLINIC | Age: 64
End: 2024-06-17

## 2024-06-17 NOTE — TELEPHONE ENCOUNTER
Patient is going to [place].  Leaving 6/24.  All her meds have to be in original bottles or boxes with provider's name on them.  She has been given samples of her Trelegy inhaler, so needs some kind of note stating that she has been prescribed that.

## 2024-07-02 DIAGNOSIS — I10 ESSENTIAL HYPERTENSION: ICD-10-CM

## 2024-07-02 DIAGNOSIS — E78.2 MIXED HYPERLIPIDEMIA: ICD-10-CM

## 2024-07-02 RX ORDER — ROSUVASTATIN CALCIUM 20 MG/1
20 TABLET, COATED ORAL DAILY
Qty: 90 TABLET | Refills: 1 | Status: SHIPPED | OUTPATIENT
Start: 2024-07-02

## 2024-07-02 RX ORDER — LOSARTAN POTASSIUM AND HYDROCHLOROTHIAZIDE 12.5; 1 MG/1; MG/1
1 TABLET ORAL DAILY
Qty: 90 TABLET | Refills: 1 | Status: SHIPPED | OUTPATIENT
Start: 2024-07-02

## 2024-07-02 NOTE — TELEPHONE ENCOUNTER
Patients last appointment 4/19/2024.  Patients next scheduled appointment   Future Appointments   Date Time Provider Department Center   10/21/2024  8:00 AM Wai Arteaga APRN - [facility] HURTADO [facility]   11/5/2024  3:00 PM RICHELLE FU US 1 ZAHRAA CARDIO [facility] Rad/Car   11/5/2024  3:30 PM Rivera iL MD VASC/MED [facility]   1/20/2025  9:30 AM ZAHRAA MARTIN [facility] 1 ZAHRAA MARTIN BC [facility] Rad/Car

## 2024-07-12 ENCOUNTER — PATIENT MESSAGE (OUTPATIENT)
Dept: PRIMARY CARE CLINIC | Age: 64
End: 2024-07-12

## 2024-07-12 RX ORDER — FLUTICASONE FUROATE, UMECLIDINIUM BROMIDE AND VILANTEROL TRIFENATATE 100; 62.5; 25 UG/1; UG/1; UG/1
1 POWDER RESPIRATORY (INHALATION) DAILY
Qty: 2 EACH | Refills: 0 | COMMUNITY
Start: 2024-07-12

## 2024-07-12 NOTE — TELEPHONE ENCOUNTER
From: Gela Naranjo  To: Wai Arteaga  Sent: 7/12/2024 7:59 AM EDT  Subject: Trelegy Ellipta    My samples of Trelegy are about out. You indicated as long as you had samples i could get them. If you don't have any, then I will need a script for it. My pharmacy will now be [facility] in [place] since [facility] closed. Thanks. Gela

## 2024-08-08 ENCOUNTER — PATIENT MESSAGE (OUTPATIENT)
Dept: PRIMARY CARE CLINIC | Age: 64
End: 2024-08-08

## 2024-08-08 RX ORDER — FLUTICASONE FUROATE, UMECLIDINIUM BROMIDE AND VILANTEROL TRIFENATATE 100; 62.5; 25 UG/1; UG/1; UG/1
1 POWDER RESPIRATORY (INHALATION) DAILY
Qty: 1 EACH | Refills: 5 | Status: SHIPPED | OUTPATIENT
Start: 2024-08-08

## 2024-08-08 NOTE — TELEPHONE ENCOUNTER
From: Gela Naranjo  To: Wai Arteaga  Sent: 8/8/2024 6:51 AM EDT  Subject: Rey Brar.    Would like to get more samples if available. If not, will need script sent to [facility] in [place]. Thank you.

## 2024-10-10 LAB
CHOLESTEROL, TOTAL: NORMAL
CHOLESTEROL/HDL RATIO: NORMAL
HDLC SERPL-MCNC: NORMAL MG/DL
LDL CHOLESTEROL: NORMAL
NONHDLC SERPL-MCNC: NORMAL MG/DL
TRIGL SERPL-MCNC: NORMAL MG/DL
VLDLC SERPL CALC-MCNC: NORMAL MG/DL

## 2024-10-17 DIAGNOSIS — I65.23 CAROTID STENOSIS, ASYMPTOMATIC, BILATERAL: Primary | ICD-10-CM

## 2024-10-18 SDOH — HEALTH STABILITY: PHYSICAL HEALTH: ON AVERAGE, HOW MANY MINUTES DO YOU ENGAGE IN EXERCISE AT THIS LEVEL?: 30 MIN

## 2024-10-18 SDOH — HEALTH STABILITY: PHYSICAL HEALTH: ON AVERAGE, HOW MANY DAYS PER WEEK DO YOU ENGAGE IN MODERATE TO STRENUOUS EXERCISE (LIKE A BRISK WALK)?: 3 DAYS

## 2024-10-18 ASSESSMENT — PATIENT HEALTH QUESTIONNAIRE - PHQ9
SUM OF ALL RESPONSES TO PHQ QUESTIONS 1-9: 0
SUM OF ALL RESPONSES TO PHQ QUESTIONS 1-9: 0
SUM OF ALL RESPONSES TO PHQ9 QUESTIONS 1 & 2: 0
2. FEELING DOWN, DEPRESSED OR HOPELESS: NOT AT ALL
1. LITTLE INTEREST OR PLEASURE IN DOING THINGS: NOT AT ALL
SUM OF ALL RESPONSES TO PHQ QUESTIONS 1-9: 0
SUM OF ALL RESPONSES TO PHQ QUESTIONS 1-9: 0

## 2024-10-18 ASSESSMENT — LIFESTYLE VARIABLES
HOW OFTEN DO YOU HAVE A DRINK CONTAINING ALCOHOL: 3
HOW OFTEN DO YOU HAVE A DRINK CONTAINING ALCOHOL: 2-4 TIMES A MONTH
HOW MANY STANDARD DRINKS CONTAINING ALCOHOL DO YOU HAVE ON A TYPICAL DAY: 1
HOW MANY STANDARD DRINKS CONTAINING ALCOHOL DO YOU HAVE ON A TYPICAL DAY: 1 OR 2
HOW OFTEN DO YOU HAVE SIX OR MORE DRINKS ON ONE OCCASION: 1

## 2024-10-21 ENCOUNTER — OFFICE VISIT (OUTPATIENT)
Dept: PRIMARY CARE CLINIC | Age: 64
End: 2024-10-21
Payer: MEDICARE

## 2024-10-21 VITALS
SYSTOLIC BLOOD PRESSURE: 156 MMHG | BODY MASS INDEX: 30.73 KG/M2 | WEIGHT: 180 LBS | DIASTOLIC BLOOD PRESSURE: 74 MMHG | TEMPERATURE: 97.8 F | HEIGHT: 64 IN | OXYGEN SATURATION: 96 % | HEART RATE: 73 BPM

## 2024-10-21 VITALS
WEIGHT: 180 LBS | SYSTOLIC BLOOD PRESSURE: 156 MMHG | HEART RATE: 73 BPM | BODY MASS INDEX: 30.88 KG/M2 | OXYGEN SATURATION: 96 % | DIASTOLIC BLOOD PRESSURE: 74 MMHG | TEMPERATURE: 97.8 F

## 2024-10-21 DIAGNOSIS — J44.9 CHRONIC OBSTRUCTIVE PULMONARY DISEASE, UNSPECIFIED COPD TYPE (HCC): ICD-10-CM

## 2024-10-21 DIAGNOSIS — I73.9 PVD (PERIPHERAL VASCULAR DISEASE) (HCC): ICD-10-CM

## 2024-10-21 DIAGNOSIS — I10 ESSENTIAL HYPERTENSION: Primary | ICD-10-CM

## 2024-10-21 DIAGNOSIS — R49.0 VOICE HOARSENESS: ICD-10-CM

## 2024-10-21 DIAGNOSIS — E11.9 TYPE 2 DIABETES MELLITUS WITHOUT COMPLICATION, WITHOUT LONG-TERM CURRENT USE OF INSULIN (HCC): ICD-10-CM

## 2024-10-21 DIAGNOSIS — I10 ESSENTIAL HYPERTENSION: ICD-10-CM

## 2024-10-21 DIAGNOSIS — E78.2 MIXED HYPERLIPIDEMIA: ICD-10-CM

## 2024-10-21 DIAGNOSIS — Z00.00 WELCOME TO MEDICARE PREVENTIVE VISIT: Primary | ICD-10-CM

## 2024-10-21 PROCEDURE — G0402 INITIAL PREVENTIVE EXAM: HCPCS | Performed by: NURSE PRACTITIONER

## 2024-10-21 PROCEDURE — G8427 DOCREV CUR MEDS BY ELIG CLIN: HCPCS | Performed by: NURSE PRACTITIONER

## 2024-10-21 PROCEDURE — 3023F SPIROM DOC REV: CPT | Performed by: NURSE PRACTITIONER

## 2024-10-21 PROCEDURE — 99214 OFFICE O/P EST MOD 30 MIN: CPT | Performed by: NURSE PRACTITIONER

## 2024-10-21 PROCEDURE — 1090F PRES/ABSN URINE INCON ASSESS: CPT | Performed by: NURSE PRACTITIONER

## 2024-10-21 PROCEDURE — 2022F DILAT RTA XM EVC RTNOPTHY: CPT | Performed by: NURSE PRACTITIONER

## 2024-10-21 PROCEDURE — G8417 CALC BMI ABV UP PARAM F/U: HCPCS | Performed by: NURSE PRACTITIONER

## 2024-10-21 PROCEDURE — 3044F HG A1C LEVEL LT 7.0%: CPT | Performed by: NURSE PRACTITIONER

## 2024-10-21 PROCEDURE — 1123F ACP DISCUSS/DSCN MKR DOCD: CPT | Performed by: NURSE PRACTITIONER

## 2024-10-21 PROCEDURE — 1036F TOBACCO NON-USER: CPT | Performed by: NURSE PRACTITIONER

## 2024-10-21 PROCEDURE — 3078F DIAST BP <80 MM HG: CPT | Performed by: NURSE PRACTITIONER

## 2024-10-21 PROCEDURE — 3017F COLORECTAL CA SCREEN DOC REV: CPT | Performed by: NURSE PRACTITIONER

## 2024-10-21 PROCEDURE — G9899 SCRN MAM PERF RSLTS DOC: HCPCS | Performed by: NURSE PRACTITIONER

## 2024-10-21 PROCEDURE — 3077F SYST BP >= 140 MM HG: CPT | Performed by: NURSE PRACTITIONER

## 2024-10-21 PROCEDURE — G8484 FLU IMMUNIZE NO ADMIN: HCPCS | Performed by: NURSE PRACTITIONER

## 2024-10-21 PROCEDURE — G8400 PT W/DXA NO RESULTS DOC: HCPCS | Performed by: NURSE PRACTITIONER

## 2024-10-21 ASSESSMENT — ENCOUNTER SYMPTOMS
RHINORRHEA: 0
CONSTIPATION: 1
SORE THROAT: 0
SINUS PRESSURE: 0
NAUSEA: 0
ABDOMINAL DISTENTION: 0
CHEST TIGHTNESS: 0
EYE DISCHARGE: 0
EYE ITCHING: 0
COLOR CHANGE: 0
DIARRHEA: 0
CHOKING: 0
FACIAL SWELLING: 0
VOMITING: 0
TROUBLE SWALLOWING: 0
ABDOMINAL PAIN: 0
SHORTNESS OF BREATH: 1
SINUS PAIN: 0
BACK PAIN: 1
VOICE CHANGE: 1
BLOOD IN STOOL: 0
WHEEZING: 0
EYE PAIN: 0
PHOTOPHOBIA: 0
EYE REDNESS: 0
COUGH: 0

## 2024-10-21 ASSESSMENT — VISUAL ACUITY
OS_CC: 20/40
OD_CC: 20/40

## 2024-10-21 NOTE — PROGRESS NOTES
10/21/24  Gela Naranjo : 1960 Sex: female  Age: 63 y.o.    Chief Complaint   Patient presents with    6 Month Follow-Up       Gela is seen today for a 6-month follow-up and a review of her overall condition and plan of care.  She is accompanied by her .  She is also here to discuss labs, that she has had recently drawn.  Her hemoglobin A1c did jump two tenths of a point to 6.6.  I reiterated the point of cutting back on foods that are high in sugar and those that are high in carbohydrates.  She does report understanding.  She was recently evaluated by cardiology for a \"whisper murmur\" that was noted during her GI workup.  She has been having issues with constipation and feeling bloated.  She feels that this bloating is related to her bowel issues.  She is going to have a colonoscopy and upper GI scope on 2024, with Dr. Lopez.  She is still having chronic issues with her back, but states that she sees a chiropractor regularly.  She is having pain to both knees.  She states that she is able to get on the floor, but has difficulty getting up.  Lastly, she reports irritation and swelling to her eyes, from her allergies.  No significant swelling, tearing or conjunctivitis noted.  She denies any further acute issues at this time.  She denies any acute confusion, forgetfulness or any change in cognition.        Review of Systems   Constitutional:  Negative for appetite change, chills, diaphoresis, fatigue, fever and unexpected weight change.   HENT:  Positive for voice change (\"Some days are worse than other\"  She feels it is from her inhaler). Negative for congestion, ear pain, facial swelling, hearing loss, nosebleeds, postnasal drip, rhinorrhea, sinus pressure, sinus pain, sneezing, sore throat, tinnitus and trouble swallowing.    Eyes:  Negative for photophobia, pain, discharge, redness and itching.        She reports that she has been having swelling to her eyes, from allergies

## 2024-10-21 NOTE — PATIENT INSTRUCTIONS
proteins. Heart-healthy proteins include seafood, lean meats and poultry, eggs, beans, peas, nuts, seeds, and soy products.     Limit drinks and foods with added sugar. These include candy, desserts, and soda pop.   Heart-healthy lifestyle    If your doctor recommends it, get more exercise. For many people, walking is a good choice. Or you may want to swim, bike, or do other activities. Bit by bit, increase the time you're active every day. Try for at least 30 minutes on most days of the week.     Try to quit or cut back on using tobacco and other nicotine products. This includes smoking and vaping. If you need help quitting, talk to your doctor about stop-smoking programs and medicines. These can increase your chances of quitting for good. Quitting is one of the most important things you can do to protect your heart. It is never too late to quit. Try to avoid secondhand smoke too.     Stay at a weight that's healthy for you. Talk to your doctor if you need help losing weight.     Try to get 7 to 9 hours of sleep each night.     Limit alcohol to 2 drinks a day for men and 1 drink a day for women. Too much alcohol can cause health problems.     Manage other health problems such as diabetes, high blood pressure, and high cholesterol. If you think you may have a problem with alcohol or drug use, talk to your doctor.   Medicines    Take your medicines exactly as prescribed. Call your doctor if you think you are having a problem with your medicine.     If your doctor recommends aspirin, take the amount directed each day. Make sure you take aspirin and not another kind of pain reliever, such as acetaminophen (Tylenol).   When should you call for help?   Call 911 if you have symptoms of a heart attack. These may include:    Chest pain or pressure, or a strange feeling in the chest.     Sweating.     Shortness of breath.     Pain, pressure, or a strange feeling in the back, neck, jaw, or upper belly or in one or both

## 2024-11-05 ENCOUNTER — OFFICE VISIT (OUTPATIENT)
Dept: VASCULAR SURGERY | Age: 64
End: 2024-11-05
Payer: MEDICARE

## 2024-11-05 ENCOUNTER — HOSPITAL ENCOUNTER (OUTPATIENT)
Dept: CARDIOLOGY | Age: 64
Discharge: HOME | End: 2024-11-07
Attending: SURGERY
Payer: MEDICARE

## 2024-11-05 VITALS — WEIGHT: 175 LBS | BODY MASS INDEX: 30.04 KG/M2

## 2024-11-05 DIAGNOSIS — I65.23 CAROTID STENOSIS, ASYMPTOMATIC, BILATERAL: ICD-10-CM

## 2024-11-05 DIAGNOSIS — I65.23 CAROTID STENOSIS, ASYMPTOMATIC, BILATERAL: Primary | ICD-10-CM

## 2024-11-05 LAB
VAS LEFT CCA DIST EDV: 28.1 CM/S
VAS LEFT CCA DIST PSV: 93 CM/S
VAS LEFT CCA PROX EDV: 21.9 CM/S
VAS LEFT CCA PROX PSV: 91.1 CM/S
VAS LEFT ECA EDV: 27.1 CM/S
VAS LEFT ECA PSV: 311.8 CM/S
VAS LEFT ICA DIST EDV: 63.6 CM/S
VAS LEFT ICA DIST PSV: 175.4 CM/S
VAS LEFT ICA MID EDV: 60.4 CM/S
VAS LEFT ICA MID PSV: 239.3 CM/S
VAS LEFT ICA PROX EDV: 53.8 CM/S
VAS LEFT ICA PROX PSV: 172.4 CM/S
VAS LEFT ICA/CCA PSV: 2.6 NO UNITS
VAS LEFT VERTEBRAL EDV: 17.5 CM/S
VAS LEFT VERTEBRAL PSV: 57 CM/S
VAS RIGHT CCA DIST EDV: 24.5 CM/S
VAS RIGHT CCA DIST PSV: 82.8 CM/S
VAS RIGHT CCA PROX EDV: 22 CM/S
VAS RIGHT CCA PROX PSV: 77.8 CM/S
VAS RIGHT ECA EDV: 12.8 CM/S
VAS RIGHT ECA PSV: 153.6 CM/S
VAS RIGHT ICA DIST EDV: 36.6 CM/S
VAS RIGHT ICA DIST PSV: 118.9 CM/S
VAS RIGHT ICA MID EDV: 47.7 CM/S
VAS RIGHT ICA MID PSV: 141.4 CM/S
VAS RIGHT ICA PROX EDV: 32.3 CM/S
VAS RIGHT ICA PROX PSV: 106.1 CM/S
VAS RIGHT ICA/CCA PSV: 1.7 NO UNITS
VAS RIGHT VERTEBRAL EDV: 10.9 CM/S
VAS RIGHT VERTEBRAL PSV: 38.4 CM/S

## 2024-11-05 PROCEDURE — G8484 FLU IMMUNIZE NO ADMIN: HCPCS | Performed by: SURGERY

## 2024-11-05 PROCEDURE — G9899 SCRN MAM PERF RSLTS DOC: HCPCS | Performed by: SURGERY

## 2024-11-05 PROCEDURE — 1036F TOBACCO NON-USER: CPT | Performed by: SURGERY

## 2024-11-05 PROCEDURE — 3017F COLORECTAL CA SCREEN DOC REV: CPT | Performed by: SURGERY

## 2024-11-05 PROCEDURE — 1090F PRES/ABSN URINE INCON ASSESS: CPT | Performed by: SURGERY

## 2024-11-05 PROCEDURE — 99213 OFFICE O/P EST LOW 20 MIN: CPT | Performed by: SURGERY

## 2024-11-05 PROCEDURE — 93880 EXTRACRANIAL BILAT STUDY: CPT

## 2024-11-05 PROCEDURE — G8417 CALC BMI ABV UP PARAM F/U: HCPCS | Performed by: SURGERY

## 2024-11-05 PROCEDURE — 1123F ACP DISCUSS/DSCN MKR DOCD: CPT | Performed by: SURGERY

## 2024-11-05 PROCEDURE — G8427 DOCREV CUR MEDS BY ELIG CLIN: HCPCS | Performed by: SURGERY

## 2024-11-05 PROCEDURE — G8400 PT W/DXA NO RESULTS DOC: HCPCS | Performed by: SURGERY

## 2024-11-05 NOTE — PROGRESS NOTES
Vascular Surgery Outpatient Progress Note      Chief Complaint   Patient presents with    Circulatory Problem     Carotid stenosis       HISTORY OF PRESENT ILLNESS:                The patient is a 63 y.o. female who returns for follow-up evaluation of carotid artery stenosis and history of right endarterectomy.  She is accompanied by her .  She denies any problems since I saw her last year.    Past Medical History:        Diagnosis Date    Allergic rhinitis     Carotid artery stenosis     COPD (chronic obstructive pulmonary disease) (HCC) 10/2012    Hypertension     Liver disease 06/2022    Fatty liver    Vitamin B12 deficiency      Past Surgical History:        Procedure Laterality Date    CAROTID ENDARTERECTOMY Right 03/31/2022    RIGHT CAROTID ENDARTERECTOMY performed by Rivera Li MD at [facility] OR    COLONOSCOPY      LUMBAR DISC SURGERY      MOUTH SURGERY      UPPER GASTROINTESTINAL ENDOSCOPY      US LYMPH NODE BIOPSY  05/08/2018    US LYMPH NODE BIOPSY     Current Medications:   Prior to Admission medications    Medication Sig Start Date End Date Taking? Authorizing Provider   fluticasone-umeclidin-vilant (TRELEGY ELLIPTA) 100-62.5-25 MCG/ACT AEPB inhaler Inhale 1 puff into the lungs daily 8/8/24  Yes Wai Arteaga APRN - NP   rosuvastatin (CRESTOR) 20 MG tablet Take 1 tablet by mouth daily 7/2/24  Yes Arteaga, Wai Chris, APRN - NP   losartan-hydroCHLOROthiazide (HYZAAR) 100-12.5 MG per tablet Take 1 tablet by mouth daily 7/2/24  Yes Wai Arteaga APRN - NP   albuterol sulfate HFA (PROVENTIL;VENTOLIN;PROAIR) 108 (90 Base) MCG/ACT inhaler inhale 2 puffs by mouth INTO THE LUNGS SIX TIMES PER DAY if needed 2/16/23  Yes Cee Cruz APRN - NP   ipratropium-albuterol (DUONEB) 0.5-2.5 (3) MG/3ML SOLN nebulizer solution Inhale 3 mLs into the lungs every 4 hours 2/16/23  Yes Cee Cruz APRN - NP   Multiple Vitamin (MULTI VITAMIN DAILY PO) Take 1 tablet by mouth daily    Yes Provider,

## 2024-12-12 DIAGNOSIS — I10 ESSENTIAL HYPERTENSION: ICD-10-CM

## 2024-12-12 RX ORDER — LOSARTAN POTASSIUM AND HYDROCHLOROTHIAZIDE 12.5; 1 MG/1; MG/1
1 TABLET ORAL DAILY
Qty: 90 TABLET | Refills: 1 | Status: SHIPPED | OUTPATIENT
Start: 2024-12-12

## 2024-12-12 NOTE — TELEPHONE ENCOUNTER
Name of Medication(s) Requested:  Requested Prescriptions     Pending Prescriptions Disp Refills    losartan-hydroCHLOROthiazide (HYZAAR) 100-12.5 MG per tablet [Pharmacy Med Name: LOSARTAN/HCT -12.5] 90 tablet 1     Sig: TAKE 1 TABLET DAILY       Medication is on current medication list Yes    Dosage and directions were verified? Yes    Quantity verified: 90 day supply     Pharmacy Verified?  Yes    Last Appointment:  10/21/2024    Future appts:  Future Appointments   Date Time Provider Department Center   1/20/2025  9:30 AM ZAHRAA MARTIN [facility] RM 1 ZAHRAA MARTIN [facility] Rad/Car   4/21/2025  8:00 AM Wai Arteaga APRN - NP [facility] DEP   10/23/2025  8:00 AM Wai Arteaga APRN - NP [facility] DEP   10/23/2025  8:30 AM Wai Arteaga APRN - NP [facility] DEP   11/18/2025  2:00 PM RICHELLE HILTON VAS US 1 ZAHRAA CARDIO [facility] Rad/Car   11/18/2025  2:30 PM Rivera Li MD [facility]/MED [facility]        (If no appt send self scheduling link. .REFILLAPPT)  Scheduling request sent?     [] Yes  [x] No    Does patient need updated?  [] Yes  [x] No

## 2025-01-07 RX ORDER — FLUTICASONE FUROATE, UMECLIDINIUM BROMIDE AND VILANTEROL TRIFENATATE 100; 62.5; 25 UG/1; UG/1; UG/1
1 POWDER RESPIRATORY (INHALATION) DAILY
Qty: 3 EACH | Refills: 1 | Status: SHIPPED | OUTPATIENT
Start: 2025-01-07

## 2025-01-07 NOTE — TELEPHONE ENCOUNTER
Name of Medication(s) Requested:  Requested Prescriptions     Pending Prescriptions Disp Refills    TRELEGY ELLIPTA 100-62.5-25 MCG/ACT AEPB inhaler [Pharmacy Med Name: TRELEGY ELLIPTA 100-62.5MCG INH 30P] 60 each      Sig: INHALE 1 PUFF INTO THE LUNGS DAILY       Medication is on current medication list Yes    Dosage and directions were verified? Yes    Quantity verified: 30 day supply     Pharmacy Verified?  Yes    Last Appointment:  10/21/2024    Future appts:  Future Appointments   Date Time Provider Department Center   1/20/2025  9:30 AM SEYZ ABDU [facility] 1 SEYZ ABDU [facility] Rad/Car   4/21/2025  8:00 AM Wai Arteaga APRN - NP [facility]   10/23/2025  8:00 AM Wai Arteaga APRN - NP [facility]   10/23/2025  8:30 AM Wai Arteaga APRN - NP [facility]   11/18/2025  2:00 PM RICHELLE HILTON VAS  1 SEYZ CARDIO [facility] Rad/Car   11/18/2025  2:30 PM Rivera Li MD VAS/MED [facility]        (If no appt send self scheduling link. .REFILLAPPT)  Scheduling request sent?     [] Yes  [x] No    Does patient need updated?  [] Yes  [x] No

## 2025-01-20 ENCOUNTER — HOSPITAL ENCOUNTER (OUTPATIENT)
Dept: GENERAL RADIOLOGY | Age: 65
Discharge: HOME | End: 2025-01-22
Payer: MEDICARE

## 2025-01-20 VITALS — BODY MASS INDEX: 29.88 KG/M2 | HEIGHT: 64 IN | WEIGHT: 175 LBS

## 2025-01-20 DIAGNOSIS — R92.8 ABNORMAL MAMMOGRAM: ICD-10-CM

## 2025-01-20 PROCEDURE — 77066 DX MAMMO INCL CAD BI: CPT

## 2025-02-17 DIAGNOSIS — E78.2 MIXED HYPERLIPIDEMIA: ICD-10-CM

## 2025-02-17 RX ORDER — ROSUVASTATIN CALCIUM 20 MG/1
20 TABLET, COATED ORAL DAILY
Qty: 90 TABLET | Refills: 1 | Status: SHIPPED | OUTPATIENT
Start: 2025-02-17

## 2025-02-17 NOTE — TELEPHONE ENCOUNTER
Name of Medication(s) Requested:  Requested Prescriptions     Pending Prescriptions Disp Refills    rosuvastatin (CRESTOR) 20 MG tablet [Pharmacy Med Name: ROSUVASTATIN TAB 20MG] 90 tablet 1     Sig: TAKE 1 TABLET DAILY       Medication is on current medication list Yes    Dosage and directions were verified? Yes    Quantity verified: 90 day supply     Pharmacy Verified?  Yes    Last Appointment:  10/21/2024    Future appts:  Future Appointments   Date Time Provider Department Center   4/21/2025  8:00 AM Wai Arteaga APRN - NP [facility]   10/23/2025  8:00 AM Wai Arteaga APRN - NP [facility]   10/23/2025  8:30 AM Wai Arteaga APRN - NP [facility]   11/18/2025  2:00 PM RICHELLE HILTON [facility] US 1 [facility]   11/18/2025  2:30 PM Rivera Li MD [facility]/MED [facility]        (If no appt send self scheduling link. .REFILLAPPT)  Scheduling request sent?     [] Yes  [x] No    Does patient need updated?  [] Yes  [x] No

## 2025-03-17 ENCOUNTER — OFFICE VISIT (OUTPATIENT)
Dept: PRIMARY CARE CLINIC | Age: 65
End: 2025-03-17
Payer: MEDICARE

## 2025-03-17 VITALS
OXYGEN SATURATION: 98 % | TEMPERATURE: 97.2 F | HEART RATE: 77 BPM | SYSTOLIC BLOOD PRESSURE: 136 MMHG | WEIGHT: 185.4 LBS | BODY MASS INDEX: 31.65 KG/M2 | DIASTOLIC BLOOD PRESSURE: 84 MMHG | RESPIRATION RATE: 14 BRPM | HEIGHT: 64 IN

## 2025-03-17 DIAGNOSIS — R05.1 ACUTE COUGH: ICD-10-CM

## 2025-03-17 DIAGNOSIS — J32.9 SINOBRONCHITIS: Primary | ICD-10-CM

## 2025-03-17 DIAGNOSIS — J40 SINOBRONCHITIS: Primary | ICD-10-CM

## 2025-03-17 DIAGNOSIS — J44.1 COPD WITH ACUTE EXACERBATION (HCC): ICD-10-CM

## 2025-03-17 PROCEDURE — 1123F ACP DISCUSS/DSCN MKR DOCD: CPT

## 2025-03-17 PROCEDURE — 3023F SPIROM DOC REV: CPT

## 2025-03-17 PROCEDURE — 99214 OFFICE O/P EST MOD 30 MIN: CPT

## 2025-03-17 PROCEDURE — 3075F SYST BP GE 130 - 139MM HG: CPT

## 2025-03-17 PROCEDURE — G8427 DOCREV CUR MEDS BY ELIG CLIN: HCPCS

## 2025-03-17 PROCEDURE — 1159F MED LIST DOCD IN RCRD: CPT

## 2025-03-17 PROCEDURE — G8400 PT W/DXA NO RESULTS DOC: HCPCS

## 2025-03-17 PROCEDURE — 1160F RVW MEDS BY RX/DR IN RCRD: CPT

## 2025-03-17 PROCEDURE — 1090F PRES/ABSN URINE INCON ASSESS: CPT

## 2025-03-17 PROCEDURE — 1036F TOBACCO NON-USER: CPT

## 2025-03-17 PROCEDURE — G8417 CALC BMI ABV UP PARAM F/U: HCPCS

## 2025-03-17 PROCEDURE — 3017F COLORECTAL CA SCREEN DOC REV: CPT

## 2025-03-17 PROCEDURE — 3079F DIAST BP 80-89 MM HG: CPT

## 2025-03-17 RX ORDER — DOXYCYCLINE HYCLATE 100 MG
100 TABLET ORAL 2 TIMES DAILY
Qty: 20 TABLET | Refills: 0 | Status: SHIPPED | OUTPATIENT
Start: 2025-03-17 | End: 2025-03-27

## 2025-03-17 RX ORDER — PREDNISONE 20 MG/1
20 TABLET ORAL 2 TIMES DAILY
Qty: 10 TABLET | Refills: 0 | Status: SHIPPED | OUTPATIENT
Start: 2025-03-17 | End: 2025-03-22

## 2025-03-17 SDOH — ECONOMIC STABILITY: FOOD INSECURITY: WITHIN THE PAST 12 MONTHS, THE FOOD YOU BOUGHT JUST DIDN'T LAST AND YOU DIDN'T HAVE MONEY TO GET MORE.: NEVER TRUE

## 2025-03-17 SDOH — ECONOMIC STABILITY: FOOD INSECURITY: WITHIN THE PAST 12 MONTHS, YOU WORRIED THAT YOUR FOOD WOULD RUN OUT BEFORE YOU GOT MONEY TO BUY MORE.: NEVER TRUE

## 2025-03-17 ASSESSMENT — PATIENT HEALTH QUESTIONNAIRE - PHQ9
SUM OF ALL RESPONSES TO PHQ QUESTIONS 1-9: 0
2. FEELING DOWN, DEPRESSED OR HOPELESS: NOT AT ALL
1. LITTLE INTEREST OR PLEASURE IN DOING THINGS: NOT AT ALL

## 2025-03-17 NOTE — PROGRESS NOTES
Chief Complaint   Cough, Congestion (Chest / nasal), and Other (Started last Wednesday morning)      History of Present Illness   Source of history provided by: patient.      Gela Naranjo is a 64 y.o. old female presenting to the walk in clinic for evaluation of a productive cough, chest congestion, and mild intermittent SOB with coughing fits x 5 days. Reports sinus pressure, nasal congestion, nasal drainage, and sore throat. Has been taking sinus medication and Mucinex OTC without relief. Denies any fever, chills, generalized body aches, neck pain/stiffness, dizziness, vision changes, ear pain, dysphagia, hemoptysis, CP, or GI symptoms. Reports hx of COPD. Reports compliance with inhalers at home. Denies current tobacco use. Denies any contact with any individuals with known COVID-19 infection or under investigation for COVID-19 infection. Patient is tolerating food and liquids PO.     ROS    Unless otherwise stated in this report or unable to obtain because of the patient's clinical or mental status as evidenced by the medical record, this patients's positive and negative responses for Review of Systems, constitutional, psych, eyes, ENT, cardiovascular, respiratory, gastrointestinal, neurological, genitourinary, musculoskeletal, integument systems and systems related to the presenting problem are either stated in the preceding or were not pertinent or were negative for the symptoms and/or complaints related to the medical problem.    Past Medical History:  has a past medical history of Allergic rhinitis, Carotid artery stenosis, COPD (chronic obstructive pulmonary disease) (HCC), Hypertension, Liver disease, and Vitamin B12 deficiency.  Past Surgical History:  has a past surgical history that includes Mouth surgery; Lumbar disc surgery; US BIOPSY LYMPH NODE (05/08/2018); Carotid endarterectomy (Right, 03/31/2022); Colonoscopy; Upper gastrointestinal endoscopy; and Breast biopsy.  Social History:  reports

## 2025-03-24 ENCOUNTER — OFFICE VISIT (OUTPATIENT)
Dept: PRIMARY CARE CLINIC | Age: 65
End: 2025-03-24
Payer: MEDICARE

## 2025-03-24 VITALS
SYSTOLIC BLOOD PRESSURE: 130 MMHG | DIASTOLIC BLOOD PRESSURE: 78 MMHG | WEIGHT: 185.6 LBS | BODY MASS INDEX: 31.69 KG/M2 | TEMPERATURE: 97.5 F | HEIGHT: 64 IN | HEART RATE: 82 BPM | OXYGEN SATURATION: 95 %

## 2025-03-24 DIAGNOSIS — R05.1 ACUTE COUGH: ICD-10-CM

## 2025-03-24 DIAGNOSIS — J40 SINOBRONCHITIS: ICD-10-CM

## 2025-03-24 DIAGNOSIS — I10 ESSENTIAL HYPERTENSION: Primary | ICD-10-CM

## 2025-03-24 DIAGNOSIS — J44.1 COPD WITH ACUTE EXACERBATION (HCC): ICD-10-CM

## 2025-03-24 DIAGNOSIS — J32.9 SINOBRONCHITIS: ICD-10-CM

## 2025-03-24 DIAGNOSIS — J44.1 COPD EXACERBATION (HCC): ICD-10-CM

## 2025-03-24 PROCEDURE — 3078F DIAST BP <80 MM HG: CPT | Performed by: NURSE PRACTITIONER

## 2025-03-24 PROCEDURE — 3075F SYST BP GE 130 - 139MM HG: CPT | Performed by: NURSE PRACTITIONER

## 2025-03-24 PROCEDURE — 3017F COLORECTAL CA SCREEN DOC REV: CPT | Performed by: NURSE PRACTITIONER

## 2025-03-24 PROCEDURE — G8400 PT W/DXA NO RESULTS DOC: HCPCS | Performed by: NURSE PRACTITIONER

## 2025-03-24 PROCEDURE — 1160F RVW MEDS BY RX/DR IN RCRD: CPT | Performed by: NURSE PRACTITIONER

## 2025-03-24 PROCEDURE — 1123F ACP DISCUSS/DSCN MKR DOCD: CPT | Performed by: NURSE PRACTITIONER

## 2025-03-24 PROCEDURE — 99213 OFFICE O/P EST LOW 20 MIN: CPT | Performed by: NURSE PRACTITIONER

## 2025-03-24 PROCEDURE — 3023F SPIROM DOC REV: CPT | Performed by: NURSE PRACTITIONER

## 2025-03-24 PROCEDURE — 1159F MED LIST DOCD IN RCRD: CPT | Performed by: NURSE PRACTITIONER

## 2025-03-24 PROCEDURE — G8427 DOCREV CUR MEDS BY ELIG CLIN: HCPCS | Performed by: NURSE PRACTITIONER

## 2025-03-24 PROCEDURE — 1036F TOBACCO NON-USER: CPT | Performed by: NURSE PRACTITIONER

## 2025-03-24 PROCEDURE — G8417 CALC BMI ABV UP PARAM F/U: HCPCS | Performed by: NURSE PRACTITIONER

## 2025-03-24 PROCEDURE — 1090F PRES/ABSN URINE INCON ASSESS: CPT | Performed by: NURSE PRACTITIONER

## 2025-03-24 RX ORDER — PREDNISONE 10 MG/1
10 TABLET ORAL 2 TIMES DAILY
Qty: 10 TABLET | Refills: 0 | Status: SHIPPED | OUTPATIENT
Start: 2025-03-24 | End: 2025-03-29

## 2025-03-24 RX ORDER — IPRATROPIUM BROMIDE AND ALBUTEROL SULFATE 2.5; .5 MG/3ML; MG/3ML
1 SOLUTION RESPIRATORY (INHALATION) EVERY 4 HOURS
Qty: 360 ML | Refills: 2 | Status: SHIPPED | OUTPATIENT
Start: 2025-03-24

## 2025-03-24 RX ORDER — BENZONATATE 200 MG/1
200 CAPSULE ORAL 3 TIMES DAILY PRN
Qty: 30 CAPSULE | Refills: 0 | Status: SHIPPED | OUTPATIENT
Start: 2025-03-24 | End: 2025-04-03

## 2025-03-24 NOTE — PROGRESS NOTES
Chief Complaint:   Cough (Phlegm present milky white), Congestion (Chest feels heavy), Fatigue, and Other (Was here last week )      History of Present Illness   Source of history provided by:  patient.     Gela Naranjo is a 64 y.o. old female who presents to primary care with a cough for the past  7  days.  States the cough is  *productive of white sputum** with clear  sputum.  NoSubjective fever noted.  States they have a denies    and therapy at home has not been successful in alleviating symptoms.  Denies any N/V/D, abdominal pain, CP, progressive SOB, dizziness, or lethargy.     History of Present Illness  The patient presents for a follow-up visit from last week.    Chief complaint: persistent cough and dyspnea. Previously diagnosed with bronchitis and prescribed antibiotics and prednisone, with one day of antibiotics remaining. Symptoms improved, with reduced nasal congestion. Productive cough persists, expectorating milky white sputum. Declined chest x-ray at last visit. Reports chest restriction and occasional lightheadedness, possibly due to difficulty in taking full breaths.    Has COPD with dyspnea. Using Trelegy Ellipta every morning and rescue inhaler as prescribed. Albuterol used as needed. Nebulizer available but lacks supplies. Appointment with Dr. Carreno next month. Using Tessalon Perles for cough suppression.    Review of Systems    Unless otherwise stated in this report or unable to obtain because of the patient's clinical or mental status as evidenced by the medical record, this patients's positive and negative responses for Review of Systems, constitutional, psych, eyes, ENT, cardiovascular, respiratory, gastrointestinal, neurological, genitourinary, musculoskeletal, integument systems and systems related to the presenting problem are either stated in the preceding or were not pertinent or were negative for the symptoms and/or complaints related to the medical problem.    Past Medical

## 2025-04-02 ENCOUNTER — RESULTS FOLLOW-UP (OUTPATIENT)
Dept: PRIMARY CARE CLINIC | Age: 65
End: 2025-04-02

## 2025-04-02 DIAGNOSIS — J44.1 COPD WITH ACUTE EXACERBATION (HCC): ICD-10-CM

## 2025-04-02 DIAGNOSIS — J32.9 SINOBRONCHITIS: ICD-10-CM

## 2025-04-02 DIAGNOSIS — J40 SINOBRONCHITIS: ICD-10-CM

## 2025-04-02 DIAGNOSIS — R05.1 ACUTE COUGH: ICD-10-CM

## 2025-04-08 LAB
ALBUMIN: NORMAL
ALP BLD-CCNC: NORMAL U/L
ALT SERPL-CCNC: NORMAL U/L
ANION GAP SERPL CALCULATED.3IONS-SCNC: NORMAL MMOL/L
AST SERPL-CCNC: NORMAL U/L
BASOPHILS ABSOLUTE: NORMAL
BASOPHILS RELATIVE PERCENT: NORMAL
BILIRUB SERPL-MCNC: NORMAL MG/DL
BUN BLDV-MCNC: NORMAL MG/DL
CALCIUM SERPL-MCNC: NORMAL MG/DL
CHLORIDE BLD-SCNC: NORMAL MMOL/L
CHOLESTEROL, TOTAL: NORMAL
CHOLESTEROL/HDL RATIO: NORMAL
CO2: NORMAL
CREAT SERPL-MCNC: NORMAL MG/DL
EOSINOPHILS ABSOLUTE: NORMAL
EOSINOPHILS RELATIVE PERCENT: NORMAL
ESTIMATED AVERAGE GLUCOSE: NORMAL
GFR, ESTIMATED: 102
GFR, ESTIMATED: NORMAL
GLUCOSE BLD-MCNC: NORMAL MG/DL
HBA1C MFR BLD: 7 %
HCT VFR BLD CALC: NORMAL %
HDLC SERPL-MCNC: NORMAL MG/DL
HEMOGLOBIN: NORMAL
LDL CHOLESTEROL: NORMAL
LYMPHOCYTES ABSOLUTE: NORMAL
LYMPHOCYTES RELATIVE PERCENT: NORMAL
MCH RBC QN AUTO: NORMAL PG
MCHC RBC AUTO-ENTMCNC: NORMAL G/DL
MCV RBC AUTO: NORMAL FL
MONOCYTES ABSOLUTE: NORMAL
MONOCYTES RELATIVE PERCENT: NORMAL
NEUTROPHILS ABSOLUTE: NORMAL
NEUTROPHILS RELATIVE PERCENT: NORMAL
NONHDLC SERPL-MCNC: NORMAL MG/DL
PLATELET # BLD: NORMAL 10*3/UL
PMV BLD AUTO: NORMAL FL
POTASSIUM SERPL-SCNC: NORMAL MMOL/L
RBC # BLD: NORMAL 10*6/UL
SODIUM BLD-SCNC: NORMAL MMOL/L
TOTAL PROTEIN: NORMAL
TRIGL SERPL-MCNC: NORMAL MG/DL
TSH SERPL DL<=0.05 MIU/L-ACNC: NORMAL M[IU]/L
VLDLC SERPL CALC-MCNC: NORMAL MG/DL
WBC # BLD: NORMAL 10*3/UL

## 2025-04-21 ENCOUNTER — OFFICE VISIT (OUTPATIENT)
Dept: PRIMARY CARE CLINIC | Age: 65
End: 2025-04-21
Payer: MEDICARE

## 2025-04-21 VITALS
SYSTOLIC BLOOD PRESSURE: 124 MMHG | RESPIRATION RATE: 14 BRPM | WEIGHT: 184.4 LBS | OXYGEN SATURATION: 95 % | HEART RATE: 70 BPM | BODY MASS INDEX: 31.48 KG/M2 | TEMPERATURE: 97.2 F | HEIGHT: 64 IN | DIASTOLIC BLOOD PRESSURE: 86 MMHG

## 2025-04-21 DIAGNOSIS — E78.2 MIXED HYPERLIPIDEMIA: ICD-10-CM

## 2025-04-21 DIAGNOSIS — I73.9 PVD (PERIPHERAL VASCULAR DISEASE): ICD-10-CM

## 2025-04-21 DIAGNOSIS — I10 ESSENTIAL HYPERTENSION: ICD-10-CM

## 2025-04-21 DIAGNOSIS — E11.9 TYPE 2 DIABETES MELLITUS WITHOUT COMPLICATION, WITHOUT LONG-TERM CURRENT USE OF INSULIN (HCC): ICD-10-CM

## 2025-04-21 DIAGNOSIS — R49.0 VOICE HOARSENESS: ICD-10-CM

## 2025-04-21 DIAGNOSIS — J44.9 CHRONIC OBSTRUCTIVE PULMONARY DISEASE, UNSPECIFIED COPD TYPE (HCC): Primary | ICD-10-CM

## 2025-04-21 PROCEDURE — 1090F PRES/ABSN URINE INCON ASSESS: CPT | Performed by: NURSE PRACTITIONER

## 2025-04-21 PROCEDURE — 3023F SPIROM DOC REV: CPT | Performed by: NURSE PRACTITIONER

## 2025-04-21 PROCEDURE — 1159F MED LIST DOCD IN RCRD: CPT | Performed by: NURSE PRACTITIONER

## 2025-04-21 PROCEDURE — 3017F COLORECTAL CA SCREEN DOC REV: CPT | Performed by: NURSE PRACTITIONER

## 2025-04-21 PROCEDURE — 1123F ACP DISCUSS/DSCN MKR DOCD: CPT | Performed by: NURSE PRACTITIONER

## 2025-04-21 PROCEDURE — 99214 OFFICE O/P EST MOD 30 MIN: CPT | Performed by: NURSE PRACTITIONER

## 2025-04-21 PROCEDURE — 3074F SYST BP LT 130 MM HG: CPT | Performed by: NURSE PRACTITIONER

## 2025-04-21 PROCEDURE — G8400 PT W/DXA NO RESULTS DOC: HCPCS | Performed by: NURSE PRACTITIONER

## 2025-04-21 PROCEDURE — 1036F TOBACCO NON-USER: CPT | Performed by: NURSE PRACTITIONER

## 2025-04-21 PROCEDURE — 1160F RVW MEDS BY RX/DR IN RCRD: CPT | Performed by: NURSE PRACTITIONER

## 2025-04-21 PROCEDURE — 2022F DILAT RTA XM EVC RTNOPTHY: CPT | Performed by: NURSE PRACTITIONER

## 2025-04-21 PROCEDURE — G8417 CALC BMI ABV UP PARAM F/U: HCPCS | Performed by: NURSE PRACTITIONER

## 2025-04-21 PROCEDURE — 3051F HG A1C>EQUAL 7.0%<8.0%: CPT | Performed by: NURSE PRACTITIONER

## 2025-04-21 PROCEDURE — G8427 DOCREV CUR MEDS BY ELIG CLIN: HCPCS | Performed by: NURSE PRACTITIONER

## 2025-04-21 PROCEDURE — 3079F DIAST BP 80-89 MM HG: CPT | Performed by: NURSE PRACTITIONER

## 2025-04-21 RX ORDER — OMEPRAZOLE 40 MG/1
CAPSULE, DELAYED RELEASE ORAL DAILY
COMMUNITY

## 2025-04-21 ASSESSMENT — ENCOUNTER SYMPTOMS
CHEST TIGHTNESS: 0
COUGH: 0
NAUSEA: 0
ABDOMINAL DISTENTION: 0
ABDOMINAL PAIN: 0
EYE DISCHARGE: 0
COLOR CHANGE: 0
RHINORRHEA: 0
SHORTNESS OF BREATH: 1
WHEEZING: 0
CONSTIPATION: 0
CHOKING: 0
BLOOD IN STOOL: 0
BACK PAIN: 1
VOICE CHANGE: 1
EYE ITCHING: 0
SORE THROAT: 0
VOMITING: 0
DIARRHEA: 0
EYE REDNESS: 0
EYE PAIN: 0
PHOTOPHOBIA: 0
FACIAL SWELLING: 0
SINUS PAIN: 0
SINUS PRESSURE: 0
TROUBLE SWALLOWING: 0

## 2025-04-21 NOTE — ASSESSMENT & PLAN NOTE
Chronic, at goal (stable), continue current treatment plan    Orders:    CBC with Auto Differential; Future    TSH; Future

## 2025-04-21 NOTE — ASSESSMENT & PLAN NOTE
Chronic, at goal (stable), continue current treatment plan and medication adherence emphasized    Orders:    Lipid Panel; Future

## 2025-04-21 NOTE — ASSESSMENT & PLAN NOTE
Chronic, not at goal (unstable), continue current plan pending work up below and lifestyle modifications recommended    Orders:    Comprehensive Metabolic Panel; Future    Hemoglobin A1C; Future    Albumin/Creatinine Ratio, Urine; Future

## 2025-04-21 NOTE — PROGRESS NOTES
ttl pk-yrs)     Types: Cigarettes     Start date: 1975     Quit date:      Years since quittin.3     Passive exposure: Past    Smokeless tobacco: Never   Vaping Use    Vaping status: Never Used   Substance and Sexual Activity    Alcohol use: Yes     Alcohol/week: 2.0 standard drinks of alcohol     Types: 2 Drinks containing 0.5 oz of alcohol per week     Comment: occassionally    Drug use: Not Currently    Sexual activity: Not Currently     Partners: Male   Other Topics Concern    Not on file   Social History Narrative    Not on file     Social Drivers of Health     Financial Resource Strain: Low Risk  (2024)    Overall Financial Resource Strain (CARDIA)     Difficulty of Paying Living Expenses: Not hard at all   Food Insecurity: No Food Insecurity (3/17/2025)    Hunger Vital Sign     Worried About Running Out of Food in the Last Year: Never true     Ran Out of Food in the Last Year: Never true   Transportation Needs: No Transportation Needs (3/17/2025)    PRAPARE - Transportation     Lack of Transportation (Medical): No     Lack of Transportation (Non-Medical): No   Physical Activity: Insufficiently Active (10/18/2024)    Exercise Vital Sign     Days of Exercise per Week: 3 days     Minutes of Exercise per Session: 30 min   Stress: Not on file   Social Connections: Not on file   Intimate Partner Violence: Not on file   Housing Stability: Low Risk  (3/17/2025)    Housing Stability Vital Sign     Unable to Pay for Housing in the Last Year: No     Number of Times Moved in the Last Year: 0     Homeless in the Last Year: No       Vitals:    25 0802   BP: 124/86   BP Site: Left Upper Arm   Patient Position: Sitting   BP Cuff Size: Large Adult   Pulse: 70   Resp: 14   Temp: 97.2 °F (36.2 °C)   TempSrc: Temporal   SpO2: 95%   Weight: 83.6 kg (184 lb 6.4 oz)   Height: 1.626 m (5' 4\")       Physical Exam  Vitals and nursing note reviewed.   Constitutional:       General: She is awake. She is not in

## 2025-05-02 RX ORDER — FLUTICASONE FUROATE, UMECLIDINIUM BROMIDE AND VILANTEROL TRIFENATATE 100; 62.5; 25 UG/1; UG/1; UG/1
1 POWDER RESPIRATORY (INHALATION) DAILY
Qty: 3 EACH | Refills: 1 | Status: SHIPPED | OUTPATIENT
Start: 2025-05-02

## 2025-05-02 NOTE — TELEPHONE ENCOUNTER
Name of Medication(s) Requested:  Requested Prescriptions     Pending Prescriptions Disp Refills    fluticasone-umeclidin-vilant (TRELEGY ELLIPTA) 100-62.5-25 MCG/ACT AEPB inhaler 3 each 1     Sig: Inhale 1 puff into the lungs daily       Medication is on current medication list Yes    Dosage and directions were verified? Yes    Quantity verified: 90 day supply     Pharmacy Verified?  Yes    Last Appointment:  4/21/2025    Future appts:  Future Appointments   Date Time Provider Department Center   10/23/2025  8:00 AM Wai Arteaga APRN - NP [facility]   10/23/2025  8:30 AM Wai Arteaga APRN - NP [facility]   11/18/2025  2:00 PM SESHAW YMIROSLAVA V[facility] US 1 [facility]   11/18/2025  2:30 PM Rivera Li MD [facility]MED [facility]        (If no appt send self scheduling link. .REFILLAPPT)  Scheduling request sent?     [] Yes  [x] No    Does patient need updated?  [] Yes  [x] No

## 2025-05-10 DIAGNOSIS — I10 ESSENTIAL HYPERTENSION: ICD-10-CM

## 2025-05-12 RX ORDER — LOSARTAN POTASSIUM AND HYDROCHLOROTHIAZIDE 12.5; 1 MG/1; MG/1
1 TABLET ORAL DAILY
Qty: 90 TABLET | Refills: 1 | Status: SHIPPED | OUTPATIENT
Start: 2025-05-12

## 2025-05-12 NOTE — TELEPHONE ENCOUNTER
Name of Medication(s) Requested:  Requested Prescriptions     Pending Prescriptions Disp Refills    losartan-hydroCHLOROthiazide (HYZAAR) 100-12.5 MG per tablet [Pharmacy Med Name: LOSARTAN/HCT -12.5] 90 tablet 1     Sig: TAKE 1 TABLET DAILY       Medication is on current medication list Yes    Dosage and directions were verified? Yes    Quantity verified: 90 day supply     Pharmacy Verified?  Yes    Last Appointment:  4/21/2025    Future appts:  Future Appointments   Date Time Provider Department Center   10/23/2025  8:00 AM Wai [facility]   10/23/2025  8:30 AM Wai Arteaga [facility]N [facility]   11/18/2025  2:00 PM RICHELLE YMIROSLAVA VAS US 1 SE[facility]   11/18/2025  2:30 PM Rivera Li MD [facility]        (If no appt send self scheduling link. .REFILLAPPT)  Scheduling request sent?     [] Yes  [x] No    Does patient need updated?  [] Yes  [x] No

## (undated) DEVICE — MINOR VASCULAR: Brand: MEDLINE INDUSTRIES, INC.

## (undated) DEVICE — SOLUTION IRRIG 1000ML 0.9% SOD CHL USP POUR PLAS BTL

## (undated) DEVICE — GOWN,AURORA,NONREINF,RAGLAN,L,STERILE: Brand: MEDLINE

## (undated) DEVICE — CATHETER,URETHRAL,REDRUBBER,STERILE,22FR: Brand: MEDLINE

## (undated) DEVICE — SET SURG BASIN MAYO REUSABLE

## (undated) DEVICE — LAPAROTOMY DRAPE WITH POUCHES: Brand: CONVERTORS

## (undated) DEVICE — TUBING SUCT 12FR MAL ALUM SHFT FN CAP VENT UNIV CONN W/ OBT

## (undated) DEVICE — LOOP VES W25MM THK1MM MAXI RED SIL FLD REPELLENT 100 PER

## (undated) DEVICE — SYSTEM CATH 20GA L1IN OD1.0668-1.143MM ID0.7874-0.8636MM

## (undated) DEVICE — 3M™ IOBAN™ 2 ANTIMICROBIAL INCISE DRAPE 6640EZ: Brand: IOBAN™ 2

## (undated) DEVICE — GLOVE SURG SZ 75 CRM LTX FREE POLYISOPRENE POLYMER BEAD ANTI

## (undated) DEVICE — GARMENT,MEDLINE,DVT,INT,CALF,MED, GEN2: Brand: MEDLINE

## (undated) DEVICE — NEEDLE HYPO 21GA L1.5IN GRN POLYPR HUB S STL REG BVL STR

## (undated) DEVICE — SOLUTION IRRIG 1000ML STRL H2O USP PLAS POUR BTL

## (undated) DEVICE — GLOVE SURG SZ 75 L12IN FNGR THK79MIL GRN LTX FREE

## (undated) DEVICE — TOWEL,OR,DSP,ST,BLUE,STD,6/PK,12PK/CS: Brand: MEDLINE

## (undated) DEVICE — NEEDLE HYPO 26GA L0.625IN TAN POLYPR HUB S STL REG BVL STR

## (undated) DEVICE — SOLUTION IV IRRIG POUR BRL 0.9% SODIUM CHL 2F7124

## (undated) DEVICE — SOLUTION IV 500ML 0.9% SOD CHL PH 5 INJ USP VIAFLX PLAS